# Patient Record
Sex: MALE | Race: WHITE | Employment: FULL TIME | URBAN - METROPOLITAN AREA
[De-identification: names, ages, dates, MRNs, and addresses within clinical notes are randomized per-mention and may not be internally consistent; named-entity substitution may affect disease eponyms.]

---

## 2020-01-05 ENCOUNTER — TRANSFERRED RECORDS (OUTPATIENT)
Dept: HEALTH INFORMATION MANAGEMENT | Facility: CLINIC | Age: 50
End: 2020-01-05

## 2020-07-01 ENCOUNTER — TRANSFERRED RECORDS (OUTPATIENT)
Dept: HEALTH INFORMATION MANAGEMENT | Facility: CLINIC | Age: 50
End: 2020-07-01

## 2020-07-14 ENCOUNTER — PRE VISIT (OUTPATIENT)
Dept: NEUROSURGERY | Facility: CLINIC | Age: 50
End: 2020-07-14

## 2020-07-14 NOTE — TELEPHONE ENCOUNTER
FUTURE VISIT INFORMATION      FUTURE VISIT INFORMATION:    Date: 7/17/2020    Time: 4pm    Location: [facility]  REFERRAL INFORMATION:    Referring provider:      Referring providers clinic:  [facility]    Reason for visit/diagnosis  Glioblastoma    RECORDS REQUESTED FROM:       Clinic name Comments Records Status Imaging Status   [facility] Dr. Lucy Rushing-7/6/2020, 6/8/2020, 3/23/2020    CT Head 8/23/2019, 1/5/2020  MR Brain 1/6/2020, 1/7/2020, 1/8/2020, 4/13/2020, 7/1/2020 Care Everywhere Requested to PACS

## 2020-07-20 ENCOUNTER — VIRTUAL VISIT (OUTPATIENT)
Dept: RADIATION ONCOLOGY | Facility: CLINIC | Age: 50
End: 2020-07-20
Attending: RADIOLOGY
Payer: COMMERCIAL

## 2020-07-20 DIAGNOSIS — C71.9 GLIOBLASTOMA (H): Primary | ICD-10-CM

## 2020-07-20 RX ORDER — CICLOPIROX OLAMINE 7.7 MG/G
0.77 CREAM TOPICAL PRN
Status: ON HOLD | COMMUNITY
Start: 2020-07-02 | End: 2020-09-29

## 2020-07-20 RX ORDER — ONDANSETRON 8 MG/1
8 TABLET, FILM COATED ORAL DAILY
Status: ON HOLD | COMMUNITY
Start: 2020-05-06 | End: 2020-09-29

## 2020-07-20 RX ORDER — METHYLPHENIDATE HYDROCHLORIDE 5 MG/1
5 TABLET ORAL 2 TIMES DAILY
Status: ON HOLD | COMMUNITY
Start: 2020-07-06 | End: 2020-09-29

## 2020-07-20 RX ORDER — PROCHLORPERAZINE MALEATE 10 MG
10 TABLET ORAL PRN
Status: ON HOLD | COMMUNITY
Start: 2020-05-06 | End: 2020-09-29

## 2020-07-20 RX ORDER — LISINOPRIL 10 MG/1
10 TABLET ORAL DAILY
Status: ON HOLD | COMMUNITY
Start: 2019-08-29 | End: 2020-09-29

## 2020-07-20 RX ORDER — DEXAMETHASONE 4 MG/1
4 TABLET ORAL DAILY
Status: ON HOLD | COMMUNITY
Start: 2020-07-06 | End: 2020-09-29

## 2020-07-20 RX ORDER — OXYCODONE HYDROCHLORIDE 5 MG/1
5 TABLET ORAL PRN
Status: ON HOLD | COMMUNITY
Start: 2020-02-26 | End: 2020-09-29

## 2020-07-20 RX ORDER — LEVETIRACETAM 750 MG/1
750 TABLET ORAL 2 TIMES DAILY
Status: ON HOLD | COMMUNITY
Start: 2020-04-17 | End: 2020-09-29

## 2020-07-20 RX ORDER — FLUTICASONE PROPIONATE 50 MCG
50 SPRAY, SUSPENSION (ML) NASAL DAILY
Status: ON HOLD | COMMUNITY
Start: 2019-08-29 | End: 2020-09-29

## 2020-07-20 RX ORDER — ESCITALOPRAM OXALATE 10 MG/1
10 TABLET ORAL DAILY
Status: ON HOLD | COMMUNITY
Start: 2020-05-05 | End: 2020-09-29

## 2020-07-20 RX ORDER — SILDENAFIL CITRATE 20 MG/1
20 TABLET ORAL PRN
Status: ON HOLD | COMMUNITY
Start: 2019-08-29 | End: 2020-09-29

## 2020-07-20 RX ORDER — OXYCODONE AND ACETAMINOPHEN 7.5; 325 MG/1; MG/1
7.5-325 TABLET ORAL PRN
Status: ON HOLD | COMMUNITY
End: 2020-09-29

## 2020-07-20 ASSESSMENT — ENCOUNTER SYMPTOMS
COUGH: 1
HEARTBURN: 0
SPUTUM PRODUCTION: 1
EYE REDNESS: 0
NAUSEA: 0
FOCAL WEAKNESS: 0
EYE PAIN: 1
PHOTOPHOBIA: 1
FALLS: 1
NERVOUS/ANXIOUS: 0
SENSORY CHANGE: 0
CHILLS: 0
MEMORY LOSS: 1
FLANK PAIN: 0
DOUBLE VISION: 0
VOMITING: 0
FEVER: 0
EYE DISCHARGE: 0
CONSTIPATION: 0
LOSS OF CONSCIOUSNESS: 0
PALPITATIONS: 0
DEPRESSION: 0
BACK PAIN: 1
TINGLING: 0
ABDOMINAL PAIN: 0
TREMORS: 0
DIAPHORESIS: 0
HEMOPTYSIS: 0
WEIGHT LOSS: 0
SORE THROAT: 0
SINUS PAIN: 0
SHORTNESS OF BREATH: 0
DIARRHEA: 1
DIZZINESS: 1
HEMATURIA: 0
BLOOD IN STOOL: 0
SPEECH CHANGE: 0
WHEEZING: 0
HALLUCINATIONS: 0
NECK PAIN: 1
WEAKNESS: 1
DYSURIA: 0
BLURRED VISION: 1
MYALGIAS: 0
HEADACHES: 1
SEIZURES: 1
FREQUENCY: 0

## 2020-07-20 ASSESSMENT — LIFESTYLE VARIABLES: SUBSTANCE_ABUSE: 0

## 2020-07-20 NOTE — PROGRESS NOTES
"Gaetano Holley is a 50 year old male who is being evaluated via a billable video visit.      The patient has been notified of following:     \"This video visit will be conducted via a call between you and your physician/provider. We have found that certain health care needs can be provided without the need for an in-person physical exam.  This service lets us provide the care you need with a video conversation.  If a prescription is necessary we can send it directly to your pharmacy.  If lab work is needed we can place an order for that and you can then stop by our lab to have the test done at a later time.    Video visits are billed at different rates depending on your insurance coverage.  Please reach out to your insurance provider with any questions.    If during the course of the call the physician/provider feels a video visit is not appropriate, you will not be charged for this service.\"    Patient has given verbal consent for Video visit? Yes  How would you like to obtain your AVS? MyChart  Will anyone else be joining your video visit? Wife Shalini        Video-Visit Details    Type of service:  Video Visit    Originating Location (pt. Location): Home    Distant Location (provider location):  [facility]     Platform used for Video Visit: [facility]  INITIAL PATIENT ASSESSMENT    Diagnosis: Glioblastoma    Prior radiation therapy:   Site Treated: Brain  [facility]    Dates: 2/20/2020    Prior chemotherapy:   Protocol: Temozolamide  Facility:    Dates: Feb 2020 with radiation       Prior hormonal therapy:No    Pain Eval:  5/10  Located in head, temporal area  Dexamethasone mild relief  Oxycodone prn     Psychosocial  Living arrangements: Wife and 9 year old son  Fall Risk: ambulates with assistive device   referral needs: Not needed    Falls Risk Screening Questions - consult    1. Have you fallen in the past one week?  No.  2. Have you felt " unsteady when walking or standing in the past one week?  Yes.    Advanced Directive: Yes - Location: home  Implantable Cardiac Device? No    Reproductive note: 2 children    Review of Systems   Constitutional: Negative for chills, diaphoresis, fever, malaise/fatigue and weight loss.   HENT: Negative for ear discharge, ear pain, hearing loss, nosebleeds, sinus pain, sore throat and tinnitus.    Eyes: Positive for blurred vision, photophobia and pain. Negative for double vision, discharge and redness.   Respiratory: Positive for cough (r/t allergies) and sputum production. Negative for hemoptysis, shortness of breath and wheezing.    Cardiovascular: Negative for chest pain and palpitations.   Gastrointestinal: Positive for diarrhea. Negative for abdominal pain, blood in stool, constipation, heartburn, nausea and vomiting.   Genitourinary: Negative for dysuria, flank pain, frequency, hematuria and urgency.   Musculoskeletal: Positive for back pain, falls and neck pain. Negative for joint pain and myalgias.   Skin: Negative for itching and rash.   Neurological: Positive for dizziness, seizures (one seizure 1/5/2020), weakness and headaches. Negative for tingling, tremors, sensory change, speech change, focal weakness and loss of consciousness.   Endo/Heme/Allergies: Positive for environmental allergies.   Psychiatric/Behavioral: Positive for memory loss. Negative for depression, hallucinations, substance abuse and suicidal ideas. The patient is not nervous/anxious.            Nurse face-to-face time: Level 4:  15 min face to face time

## 2020-07-20 NOTE — LETTER
"    7/20/2020         RE: Gaetano Holley  [address]  [place] [address]        Dear Colleague,    Thank you for referring your patient, Gaetano Holley, to the [facility]. Please see a copy of my visit note below.    Gaetano Holley is a 50 year old male who is being evaluated via a billable video visit.      The patient has been notified of following:     \"This video visit will be conducted via a call between you and your physician/provider. We have found that certain health care needs can be provided without the need for an in-person physical exam.  This service lets us provide the care you need with a video conversation.  If a prescription is necessary we can send it directly to your pharmacy.  If lab work is needed we can place an order for that and you can then stop by our lab to have the test done at a later time.    Video visits are billed at different rates depending on your insurance coverage.  Please reach out to your insurance provider with any questions.    If during the course of the call the physician/provider feels a video visit is not appropriate, you will not be charged for this service.\"    Patient has given verbal consent for Video visit? Yes  How would you like to obtain your AVS? MyChart  Will anyone else be joining your video visit? Wife Shalini        Video-Visit Details    Type of service:  Video Visit    Originating Location (pt. Location): Home    Distant Location (provider location):  [facility]     Platform used for Video Visit: [facility]  INITIAL PATIENT ASSESSMENT    Diagnosis: Glioblastoma    Prior radiation therapy:   Site Treated: Brain  [facility]    Dates: 2/20/2020    Prior chemotherapy:   Protocol: Temozolamide  Facility: [facility] [place]   Dates: Feb 2020 with radiation       Prior hormonal therapy:No    Pain Eval:  5/10  Located in head, temporal area  Dexamethasone mild relief  Oxycodone prn     Psychosocial  Living " arrangements: Wife and 9 year old son  Fall Risk: ambulates with assistive device   referral needs: Not needed    Falls Risk Screening Questions - consult    1. Have you fallen in the past one week?  No.  2. Have you felt unsteady when walking or standing in the past one week?  Yes.    Advanced Directive: Yes - Location: home  Implantable Cardiac Device? No    Reproductive note: 2 children    Review of Systems   Constitutional: Negative for chills, diaphoresis, fever, malaise/fatigue and weight loss.   HENT: Negative for ear discharge, ear pain, hearing loss, nosebleeds, sinus pain, sore throat and tinnitus.    Eyes: Positive for blurred vision, photophobia and pain. Negative for double vision, discharge and redness.   Respiratory: Positive for cough (r/t allergies) and sputum production. Negative for hemoptysis, shortness of breath and wheezing.    Cardiovascular: Negative for chest pain and palpitations.   Gastrointestinal: Positive for diarrhea. Negative for abdominal pain, blood in stool, constipation, heartburn, nausea and vomiting.   Genitourinary: Negative for dysuria, flank pain, frequency, hematuria and urgency.   Musculoskeletal: Positive for back pain, falls and neck pain. Negative for joint pain and myalgias.   Skin: Negative for itching and rash.   Neurological: Positive for dizziness, seizures (one seizure 1/5/2020), weakness and headaches. Negative for tingling, tremors, sensory change, speech change, focal weakness and loss of consciousness.   Endo/Heme/Allergies: Positive for environmental allergies.   Psychiatric/Behavioral: Positive for memory loss. Negative for depression, hallucinations, substance abuse and suicidal ideas. The patient is not nervous/anxious.            Nurse face-to-face time: Level 4:  15 min face to face time        Radiation Oncology  VIDEO  Consultation:  Date on this visit: 7/20/2020    Gaetano Holley  is referred by   for a radiation oncology  consultation. He requires evaluation for diagnosis of   Glioblastoma     History Of Present Illness:  Mr. Holley is a 50 year old male who presents with a diagnosis of GBM  (IDH negative MGMT status negative, TERT mutation  )  diagnosed in Jan 2020.      Treated with radical subtotal resection under the care of Dr. Lambert in [place]   followed by a course of adjuvant chemoradiation with daily oral Temodar   6000 cGy.   Treatment ended in March    He underwent follow up MRi in April  and started adjuvant temodar and optune.   Follow up MRi on July 1 showed possible  progression or pseudoprogression    He started having daily morning headaches and was started on decadron with relief although not complete relief.    He was referred back to his surgeon and the plan was to re-resect the area.  They heard about our Gammatile program and saw Dr bolton last week.    We discussed his case at our neuro conference on Monday.  Our radiologists thought the MRI looked more like pseudoprogression, than real preogression  We thought a follow up MRi with PERFUSION would be a good next step.    He is talking to me now about my opinion on what to do next.   Past Medical/Surgical History:  Past Medical History:   Diagnosis Date     Abdominal pain 2008    unspecified      Acute cholecystitis 07/01/2008     Depressive disorder 1998     Erectile dysfunction 2008     Hypertension      Impaired fasting glucose 12/12/2008     Iron deficiency anemia      Past Surgical History:   Procedure Laterality Date     CHOLECYSTECTOMY  07/01/2008     colonoscopy biopsy  12/01/2008     right frontal craniotomy  01/07/2020       Past Radiation History:as above  Past Chemotherapy History:as above  Implanted Cardiac device:   none  Advanced directive  :  no    Review of Systems:  Reviewed.  See details in nursing note    Allergies:  Allergies as of 07/20/2020 - Reviewed 07/20/2020   Allergen Reaction Noted     Food Other (See Comments) 06/30/2008     Morphine   07/18/2008     Penicillins Other (See Comments) 01/28/2003     Pineapple Nausea and Vomiting 1972       Current Medications:     Current Outpatient Medications:      ciclopirox (LOPROX) 0.77 % cream, Apply 0.77 Containers topically as needed, Disp: , Rfl:      dexamethasone (DECADRON) 4 MG tablet, Take 4 mg by mouth daily, Disp: , Rfl:      escitalopram (LEXAPRO) 10 MG tablet, Take 10 mg by mouth daily, Disp: , Rfl:      levETIRAcetam (KEPPRA) 750 MG tablet, Take 750 mg by mouth 2 times daily , Disp: , Rfl:      lisinopril (ZESTRIL) 10 MG tablet, Take 10 mg by mouth daily, Disp: , Rfl:      methylphenidate (RITALIN) 5 MG tablet, Take 5 mg by mouth 2 times daily, Disp: , Rfl:      ondansetron (ZOFRAN) 8 MG tablet, Take 8 mg by mouth daily, Disp: , Rfl:      oxyCODONE (ROXICODONE) 5 MG tablet, Take 5 mg by mouth as needed, Disp: , Rfl:      prochlorperazine (COMPAZINE) 10 MG tablet, Take 10 mg by mouth as needed, Disp: , Rfl:      sildenafil (REVATIO) 20 MG tablet, Take 20 mg by mouth as needed, Disp: , Rfl:      fluticasone (FLONASE) 50 MCG/ACT nasal spray, Spray 50 sprays into both nostrils daily, Disp: , Rfl:      oxyCODONE-acetaminophen (PERCOCET) 7.5-325 MG per tablet, Take 7.5-325 tablets by mouth as needed, Disp: , Rfl:     Family History:  History reviewed. No pertinent family history.    Social History:  wife on the video visit with .   Physical Exam:  video exam  GENERAL: Healthy, alert and no distress  EYES: Eyes grossly normal to inspection.  No discharge or erythema, or obvious scleral/conjunctival abnormalities.  RESP: No audible wheeze, cough, or visible cyanosis.  No visible retractions or increased work of breathing.    SKIN: Visible skin clear. No significant rash, abnormal pigmentation or lesions.  NEURO: Cranial nerves grossly intact.  Mentation and speech appropriate for age.  PSYCH: Mentation appears normal, affect normal/bright, judgement and insight intact, normal speech and appearance  well-groomed.      Pathology:as above     Laboratory/Imaging Studies  reviewed  ASSESSMENT:    GBM s/p resection and chemoradiotherapy now with MRI findings suggestive of either progrssion or pseudoprogression      RECOMMENDATION:    To tell progression from pseudoprogression is often a challenge.   Perfusion or PET scans are useful.    Serial MRIs may be helpflul.      My suggestion would be to repeat the MRI in a few weeks with perfusion imaging.   WE may be able to tell if this is progression or not then.     He informed me that they can do perfusion in [place] and that they have that scheduled there next week.   I suggested that they send the images to us to review.    I went over the pros and cons of max safe resection and gammatile placement.   I would not consider that option until we knew for sure this was progression and my preference would be to try to delay use of re-irradiation this close to the initiail radiotherapy. .    Thank you for allowing me to participate in Gaetano Holley 's care .  Please do not hesitate to call me with questions.    Salma Sarabia MD  [phone]   Pager   [phone]  [facility]   [phone] [place]  [phone] [facility]  Primary Physician: David Quiñonez   Patient Care Team:  David Quiñonez MD as PCP - General (Family Practice)  Raul Haq MD as MD (Neurosurgery)                  Again, thank you for allowing me to participate in the care of your patient.        Sincerely,        Salma Sarabia MD

## 2020-07-21 NOTE — PROGRESS NOTES
Radiation Oncology  VIDEO  Consultation:  Date on this visit: 7/20/2020    Gaetano Holley  is referred by   for a radiation oncology consultation. He requires evaluation for diagnosis of   Glioblastoma     History Of Present Illness:  Mr. Holley is a 50 year old male who presents with a diagnosis of GBM  (IDH negative MGMT status negative, TERT mutation  )  diagnosed in Jan 2020.      Treated with radical subtotal resection under the care of Dr. Lambert in [place]   followed by a course of adjuvant chemoradiation with daily oral Temodar   6000 cGy.   Treatment ended in March    He underwent follow up MRi in April  and started adjuvant temodar and optune.   Follow up MRi on July 1 showed possible  progression or pseudoprogression    He started having daily morning headaches and was started on decadron with relief although not complete relief.    He was referred back to his surgeon and the plan was to re-resect the area.  They heard about our Gammatile program and saw Dr bolton last week.    We discussed his case at our neuro conference on Monday.  Our radiologists thought the MRI looked more like pseudoprogression, than real preogression  We thought a follow up MRi with PERFUSION would be a good next step.    He is talking to me now about my opinion on what to do next.   Past Medical/Surgical History:  Past Medical History:   Diagnosis Date     Abdominal pain 2008    unspecified      Acute cholecystitis 07/01/2008     Depressive disorder 1998     Erectile dysfunction 2008     Hypertension      Impaired fasting glucose 12/12/2008     Iron deficiency anemia      Past Surgical History:   Procedure Laterality Date     CHOLECYSTECTOMY  07/01/2008     colonoscopy biopsy  12/01/2008     right frontal craniotomy  01/07/2020       Past Radiation History:as above  Past Chemotherapy History:as above  Implanted Cardiac device:   none  Advanced directive  :  no    Review of Systems:  Reviewed.  See details in nursing  note    Allergies:  Allergies as of 07/20/2020 - Reviewed 07/20/2020   Allergen Reaction Noted     Food Other (See Comments) 06/30/2008     Morphine  07/18/2008     Penicillins Other (See Comments) 01/28/2003     Pineapple Nausea and Vomiting 1972       Current Medications:     Current Outpatient Medications:      ciclopirox (LOPROX) 0.77 % cream, Apply 0.77 Containers topically as needed, Disp: , Rfl:      dexamethasone (DECADRON) 4 MG tablet, Take 4 mg by mouth daily, Disp: , Rfl:      escitalopram (LEXAPRO) 10 MG tablet, Take 10 mg by mouth daily, Disp: , Rfl:      levETIRAcetam (KEPPRA) 750 MG tablet, Take 750 mg by mouth 2 times daily , Disp: , Rfl:      lisinopril (ZESTRIL) 10 MG tablet, Take 10 mg by mouth daily, Disp: , Rfl:      methylphenidate (RITALIN) 5 MG tablet, Take 5 mg by mouth 2 times daily, Disp: , Rfl:      ondansetron (ZOFRAN) 8 MG tablet, Take 8 mg by mouth daily, Disp: , Rfl:      oxyCODONE (ROXICODONE) 5 MG tablet, Take 5 mg by mouth as needed, Disp: , Rfl:      prochlorperazine (COMPAZINE) 10 MG tablet, Take 10 mg by mouth as needed, Disp: , Rfl:      sildenafil (REVATIO) 20 MG tablet, Take 20 mg by mouth as needed, Disp: , Rfl:      fluticasone (FLONASE) 50 MCG/ACT nasal spray, Spray 50 sprays into both nostrils daily, Disp: , Rfl:      oxyCODONE-acetaminophen (PERCOCET) 7.5-325 MG per tablet, Take 7.5-325 tablets by mouth as needed, Disp: , Rfl:     Family History:  History reviewed. No pertinent family history.    Social History:  wife on the video visit with .   Physical Exam:  video exam  GENERAL: Healthy, alert and no distress  EYES: Eyes grossly normal to inspection.  No discharge or erythema, or obvious scleral/conjunctival abnormalities.  RESP: No audible wheeze, cough, or visible cyanosis.  No visible retractions or increased work of breathing.    SKIN: Visible skin clear. No significant rash, abnormal pigmentation or lesions.  NEURO: Cranial nerves grossly intact.   Mentation and speech appropriate for age.  PSYCH: Mentation appears normal, affect normal/bright, judgement and insight intact, normal speech and appearance well-groomed.      Pathology:as above     Laboratory/Imaging Studies  reviewed  ASSESSMENT:    GBM s/p resection and chemoradiotherapy now with MRI findings suggestive of either progrssion or pseudoprogression      RECOMMENDATION:    To tell progression from pseudoprogression is often a challenge.   Perfusion or PET scans are useful.    Serial MRIs may be helpflul.      My suggestion would be to repeat the MRI in a few weeks with perfusion imaging.   WE may be able to tell if this is progression or not then.     He informed me that they can do perfusion in [place] and that they have that scheduled there next week.   I suggested that they send the images to us to review.    I went over the pros and cons of max safe resection and gammatile placement.   I would not consider that option until we knew for sure this was progression and my preference would be to try to delay use of re-irradiation this close to the initiail radiotherapy. .    Thank you for allowing me to participate in Gaetano Holley 's care .  Please do not hesitate to call me with questions.    Salma Sarabia MD  [phone]   Pager   [phone]  [facility]   [phone] [place]  [phone] [facility]  Primary Physician: David Quiñonez   Patient Care Team:  David Quiñonez MD as PCP - General (Family Practice)  Raul Haq MD as MD (Neurosurgery)

## 2020-07-22 DIAGNOSIS — C71.9 GLIOBLASTOMA (H): Primary | ICD-10-CM

## 2020-08-04 ENCOUNTER — HOSPITAL ENCOUNTER (OUTPATIENT)
Dept: MRI IMAGING | Facility: CLINIC | Age: 50
Discharge: HOME | End: 2020-08-04
Attending: RADIOLOGY | Admitting: RADIOLOGY
Payer: COMMERCIAL

## 2020-08-04 DIAGNOSIS — C71.9 GLIOBLASTOMA (H): ICD-10-CM

## 2020-08-04 PROCEDURE — A9585 GADOBUTROL INJECTION: HCPCS | Performed by: RADIOLOGY

## 2020-08-04 PROCEDURE — 25500064 ZZH RX 255 OP 636: Performed by: RADIOLOGY

## 2020-08-04 PROCEDURE — 70553 MRI BRAIN STEM W/O & W/DYE: CPT

## 2020-08-04 RX ORDER — GADOBUTROL 604.72 MG/ML
10 INJECTION INTRAVENOUS ONCE
Status: COMPLETED | OUTPATIENT
Start: 2020-08-04 | End: 2020-08-04

## 2020-08-04 RX ADMIN — GADOBUTROL 10 ML: 604.72 INJECTION INTRAVENOUS at 14:26

## 2020-08-06 DIAGNOSIS — C71.9 GLIOBLASTOMA (H): Primary | ICD-10-CM

## 2020-08-10 ENCOUNTER — TUMOR CONFERENCE (OUTPATIENT)
Dept: ONCOLOGY | Facility: CLINIC | Age: 50
End: 2020-08-10
Attending: RADIOLOGY

## 2020-08-10 DIAGNOSIS — C71.9 GLIOBLASTOMA (H): ICD-10-CM

## 2020-08-10 NOTE — TUMOR CONFERENCE
Tumor Conference Information  Tumor Conference:  Brain  Specialties Present:  Pathology, Radiology, Radiation oncology, Surgery  Patient Status:  A current patient  Pathology:  Not Discussed  Treatment to Date:  Surgical intervention(s), Chemoradiation, Adjuvant chemotherapy  Clinical Trial Eligibility:  Not discussed  Recommended Plan:  Observation (see comment) (Comment: reviewed imaging; recommend repeat imaging in 1-2 months with follow up)  Did the review exceed 30 minutes?:  did not           Documentation / Disclaimer Cancer Tumor Board Note  Cancer tumor board recommendations do not override what is determined to be reasonable care and treatment, which is dependent on the circumstances of a patient's case; the patient's medical, social, and personal concerns; and the clinical judgment of the oncologist [physician].

## 2020-09-16 ENCOUNTER — PREP FOR PROCEDURE (OUTPATIENT)
Dept: NEUROSURGERY | Facility: CLINIC | Age: 50
End: 2020-09-16

## 2020-09-16 DIAGNOSIS — C71.9 GLIOBLASTOMA (H): Primary | ICD-10-CM

## 2020-09-17 DIAGNOSIS — C71.9 GLIOBLASTOMA (H): Primary | ICD-10-CM

## 2020-09-18 ENCOUNTER — VIRTUAL VISIT (OUTPATIENT)
Dept: NEUROSURGERY | Facility: CLINIC | Age: 50
End: 2020-09-18
Attending: NEUROLOGICAL SURGERY
Payer: COMMERCIAL

## 2020-09-18 ENCOUNTER — DOCUMENTATION ONLY (OUTPATIENT)
Dept: NEUROSURGERY | Facility: CLINIC | Age: 50
End: 2020-09-18

## 2020-09-18 ENCOUNTER — PREP FOR PROCEDURE (OUTPATIENT)
Dept: NEUROSURGERY | Facility: CLINIC | Age: 50
End: 2020-09-18

## 2020-09-18 DIAGNOSIS — C71.9 GLIOBLASTOMA (H): Primary | ICD-10-CM

## 2020-09-18 DIAGNOSIS — C71.1 MALIGNANT NEOPLASM OF FRONTAL LOBE OF BRAIN (H): Primary | ICD-10-CM

## 2020-09-18 PROCEDURE — 40001009 ZZH VIDEO/TELEPHONE VISIT; NO CHARGE

## 2020-09-18 RX ORDER — LOMUSTINE 100 MG/1
CAPSULE, GELATIN COATED ORAL
Status: ON HOLD | COMMUNITY
Start: 2020-08-07 | End: 2020-09-29

## 2020-09-18 RX ORDER — OXYCODONE 13.5 MG/1
CAPSULE, EXTENDED RELEASE ORAL
Status: ON HOLD | COMMUNITY
Start: 2020-08-14 | End: 2020-09-29

## 2020-09-18 NOTE — LETTER
"    9/18/2020         RE: Gaetano Holley  [address]  [place] [address]        Dear Colleague,    Thank you for referring your patient, Gaetano Holley, to the [facility]. Please see a copy of my visit note below.    Gaetano Holley is a 50 year old male who is being evaluated via a billable video visit.      The patient has been notified of following:     \"This video visit will be conducted via a call between you and your physician/provider. We have found that certain health care needs can be provided without the need for an in-person physical exam.  This service lets us provide the care you need with a video conversation.  If a prescription is necessary we can send it directly to your pharmacy.  If lab work is needed we can place an order for that and you can then stop by our lab to have the test done at a later time.    Video visits are billed at different rates depending on your insurance coverage.  Please reach out to your insurance provider with any questions.    If during the course of the call the physician/provider feels a video visit is not appropriate, you will not be charged for this service.\"    Patient has given verbal consent for Video visit? Yes    How would you like to obtain your AVS? MyChart     If you are dropped from the video visit, the video invite should be resent to: Text to cell phone: [phone]     Will anyone else be joining your video visit? No          Vitals - Patient Reported  Weight (Patient Reported): 104.3 kg (230 lb)  Height (Patient Reported): 180.3 cm (5' 11\")  BMI (Based on Pt Reported Ht/Wt): 32.08  Pain Score: Severe Pain (6)  Pain Loc: Head    Jomar James LPN      Video-Visit Details    Type of service:  Video Visit    Video Start Time: 2:38 PM  Video End Time: 3:25 PM    Originating Location (pt. Location): Home    Distant Location (provider location):  [facility]     Platform used for Video Visit: " Kath Haq MD    Neurosurgery Clinic Note    50 M with an right frontal, wtIDH glioblastoma s/p TMZ/RT with progressive enlargement of CE+ lesion on serial MRI. Associated with this progressive CE+ consolidation/enlargement is the patient's increased complaint of left-sided weakness/imbalance. I had reviewed the images with Dr. Sarabia and we jointly feel that surgical resection is warranted at this time. Dr. Sarabia does not feel that the patient is a good candidate for gammatile placement. Examination is as before, though the patient feels that he is subjectively weaker on the left, with greater imbalance on stand and gait.     MRI from 9/11/2020 was reviewed and is as above described    AP: 50 M with likely recurrent glioblastoma who can benefit from a surgical resection. Risks and benefits of a 5ALA aided resection was reviewed with the patient and his wife. All questions are answered. I will proceed to schedule this surgery at the next available date.    I have spent 45  minutes today, with the majority of the visit counseling the patient on the diagnosis. All questions were answered. Over 50% of my time on the unit was spent counseling the patient and coordinating care regarding surgery of the right frontal glioblastoma.              Again, thank you for allowing me to participate in the care of your patient.        Sincerely,        Raul Haq MD

## 2020-09-18 NOTE — PROGRESS NOTES
"Gaetano Holley is a 50 year old male who is being evaluated via a billable video visit.      The patient has been notified of following:     \"This video visit will be conducted via a call between you and your physician/provider. We have found that certain health care needs can be provided without the need for an in-person physical exam.  This service lets us provide the care you need with a video conversation.  If a prescription is necessary we can send it directly to your pharmacy.  If lab work is needed we can place an order for that and you can then stop by our lab to have the test done at a later time.    Video visits are billed at different rates depending on your insurance coverage.  Please reach out to your insurance provider with any questions.    If during the course of the call the physician/provider feels a video visit is not appropriate, you will not be charged for this service.\"    Patient has given verbal consent for Video visit? Yes    How would you like to obtain your AVS? MyChart     If you are dropped from the video visit, the video invite should be resent to: Text to cell phone: [phone]     Will anyone else be joining your video visit? No          Vitals - Patient Reported  Weight (Patient Reported): 104.3 kg (230 lb)  Height (Patient Reported): 180.3 cm (5' 11\")  BMI (Based on Pt Reported Ht/Wt): 32.08  Pain Score: Severe Pain (6)  Pain Loc: Head    [facility] LPN      Video-Visit Details    Type of service:  Video Visit    Video Start Time: 2:38 PM  Video End Time: 3:25 PM    Originating Location (pt. Location): Home    Distant Location (provider location):  [facility]     Platform used for Video Visit: Kath Haq MD    Neurosurgery Clinic Note    50 M with an right frontal, wtIDH glioblastoma s/p TMZ/RT with progressive enlargement of CE+ lesion on serial MRI. Associated with this progressive CE+ consolidation/enlargement is the patient's " increased complaint of left-sided weakness/imbalance. I had reviewed the images with Dr. Sarabia and we jointly feel that surgical resection is warranted at this time. Dr. Sarabia does not feel that the patient is a good candidate for gammatile placement. Examination is as before, though the patient feels that he is subjectively weaker on the left, with greater imbalance on stand and gait.     MRI from 9/11/2020 was reviewed and is as above described    AP: 50 M with likely recurrent glioblastoma who can benefit from a surgical resection. Risks and benefits of a 5ALA aided resection was reviewed with the patient and his wife. All questions are answered. I will proceed to schedule this surgery at the next available date.    I have spent 45  minutes today, with the majority of the visit counseling the patient on the diagnosis. All questions were answered. Over 50% of my time on the unit was spent counseling the patient and coordinating care regarding surgery of the right frontal glioblastoma.

## 2020-09-21 ENCOUNTER — PATIENT OUTREACH (OUTPATIENT)
Dept: ONCOLOGY | Facility: CLINIC | Age: 50
End: 2020-09-21

## 2020-09-21 ENCOUNTER — TUMOR CONFERENCE (OUTPATIENT)
Dept: ONCOLOGY | Facility: CLINIC | Age: 50
End: 2020-09-21
Attending: RADIOLOGY

## 2020-09-21 DIAGNOSIS — C71.9 GLIOBLASTOMA (H): ICD-10-CM

## 2020-09-21 NOTE — TUMOR CONFERENCE
Tumor Conference Information  Tumor Conference:  Brain  Specialties Present:  Medical oncology, Pathology, Radiology, Radiation oncology, Surgery  Patient Status:  A current patient, Recurrence  Pathology:  Not Discussed  Treatment to Date:  Surgical intervention(s), Chemoradiation, Adjuvant chemotherapy  Clinical Trial Eligibility:  Not discussed  Recommended Plan:  Surgery, Palliative radiation therapy (Comment: reviewed imaging; plan for surgery this week and postop radiation to follow)  Did the review exceed 30 minutes?:  did not           Documentation / Disclaimer Cancer Tumor Board Note  Cancer tumor board recommendations do not override what is determined to be reasonable care and treatment, which is dependent on the circumstances of a patient's case; the patient's medical, social, and personal concerns; and the clinical judgment of the oncologist [physician].

## 2020-09-22 ENCOUNTER — DOCUMENTATION ONLY (OUTPATIENT)
Dept: CARE COORDINATION | Facility: CLINIC | Age: 50
End: 2020-09-22

## 2020-09-22 ENCOUNTER — ANESTHESIA EVENT (OUTPATIENT)
Dept: SURGERY | Facility: CLINIC | Age: 50
DRG: 025 | End: 2020-09-22
Payer: COMMERCIAL

## 2020-09-22 ASSESSMENT — ENCOUNTER SYMPTOMS: SEIZURES: 1

## 2020-09-22 NOTE — ANESTHESIA PREPROCEDURE EVALUATION
Anesthesia Pre-Procedure Evaluation    Patient: Gaetano Holley   MRN:     [id number] Gender:   male   Age:    50 year old :      1970        Preoperative Diagnosis: Glioblastoma (H) [C71.9]   Procedure(s):  INTRAOPERATIVE Magnetic Resonance Imaging CRANIOTOMY, USING OPTICAL TRACKING SYSTEM, [place]     LABS:  CBC: No results found for: WBC, HGB, HCT, PLT  BMP: No results found for: NA, POTASSIUM, CHLORIDE, CO2, BUN, CR, GLC  COAGS: No results found for: PTT, INR, FIBR  POC: No results found for: BGM, HCG, HCGS  OTHER: No results found for: PH, LACT, A1C, MARGUERITE, PHOS, MAG, ALBUMIN, PROTTOTAL, ALT, AST, GGT, ALKPHOS, BILITOTAL, BILIDIRECT, LIPASE, AMYLASE, LEONORA, TSH, T4, T3, CRP, SED     Preop Vitals    BP Readings from Last 3 Encounters:   No data found for BP    Pulse Readings from Last 3 Encounters:   No data found for Pulse      Resp Readings from Last 3 Encounters:   No data found for Resp    SpO2 Readings from Last 3 Encounters:   No data found for SpO2      Temp Readings from Last 1 Encounters:   No data found for Temp    Ht Readings from Last 1 Encounters:   No data found for Ht      Wt Readings from Last 1 Encounters:   No data found for Wt    There is no height or weight on file to calculate BMI.     LDA:        Past Medical History:   Diagnosis Date     Abdominal pain     unspecified      Acute cholecystitis 2008     Depressive disorder 1998     Erectile dysfunction      Hypertension      Impaired fasting glucose 2008     Iron deficiency anemia       Past Surgical History:   Procedure Laterality Date     CHOLECYSTECTOMY  2008     colonoscopy biopsy  2008     right frontal craniotomy  2020      Allergies   Allergen Reactions     Food Other (See Comments)     Pineapple and pineapple juice causes severe vomiting     Morphine      Sedated but the pain was not relieved       Penicillins Other (See Comments)     Childhood reaction, possible anaphylaxis      Pineapple Nausea and Vomiting        Anesthesia Evaluation     . Pt has had prior anesthetic. Type: General    No history of anesthetic complications          ROS/MED HX    ENT/Pulmonary:     (+)allergic rhinitis, , . .    Neurologic: Comment: Glioma R frontal cortex. On Keppra, dexamethasone.    LUE and LLE weakness; needs cane to help with getting around    (+)seizures    (-) CVA and TIA   Cardiovascular: Comment: On sildenafil for erectile dysfunction    TTE 8/28/2019:  LV EF 50-55% nl size, no RWMA  RV normal    (+) hypertension (on lisinopril)----. : . . . :. .      (-) arrhythmias, irregular heartbeat/palpitations and valvular problems/murmurs   METS/Exercise Tolerance:  >4 METS   Hematologic:         Musculoskeletal:         GI/Hepatic:     (+) GERD (on omeprazole)      (-) liver disease   Renal/Genitourinary:  - ROS Renal section negative    (-) renal disease   Endo:     (+) Obesity, .      Psychiatric:     (+) psychiatric history depression (on escitalopram)      Infectious Disease:         Malignancy:   (+) Malignancy (GBM s/p TMZ and RT; likely recurrence) History of Neuro  Neuro CA Active status post Chemo and Radiation.          Other: Comment: Oxycodone 10mg PRN   (+) H/O Chronic Pain,H/O chronic opiod use ,                        PHYSICAL EXAM:   Mental Status/Neuro: A/A/O   Airway: Facies: Feasible  Mallampati: II  TM distance: > 6 cm  Neck ROM: Full   Respiratory: Auscultation: CTAB     Resp. Rate: Normal     Resp. Effort: Normal      CV: Rhythm: Regular  Heart: Normal Sounds   Comments:      Dental: Normal Dentition                Assessment:   ASA SCORE: 3      Smoking Status:  Non-Smoker/Unknown   NPO Status: NPO Appropriate     Plan:   Anes. Type:  General   Pre-Medication: None   Induction:  IV (Standard)   Airway: ETT; Oral   Access/Monitoring: PIV; 2nd PIV; A-Line   Maintenance: Balanced     Postop Plan:   Postop Pain: Opioids  Postop Sedation/Airway: Not planned  Disposition: Inpatient/Admit      PONV Management:   Adult Risk Factors:, Non-Smoker, Postop Opioids   Prevention: Ondansetron, Dexamethasone     CONSENT: Direct conversation   Plan and risks discussed with: Patient   Blood Products: Consented (ALL Blood Products)                   Luis Alfredo Bordy MD

## 2020-09-22 NOTE — PROGRESS NOTES
Spoke with patient after consultation appt with Dr. Haq. Pt  verbalizes understanding of Dr. Haq's plan of care and denies questions or barriers to care today.    Introduced self and role of RN Care Coordinator at [facility].  Provided my contact information.    Answered question regarding Craniotomy    Verbal and writtien instructions provided re:     Pre-operative instructions/routine and requirements to include:  Surgical procedure, need for History and Physical Preoperative Assessment Center appointment, NPO prior to surgery, showering instructions, medications to avoid,  post-operative expectations, pain control, follow-up after surgery and contact information for questions or concerns prior to surgery.        Time was provided for patient to ask questions and they were answered to his/her stated satisfaction and understanding.      Mary Kay Queen, RN, BSN  Care Coordinator  [facility]

## 2020-09-23 ENCOUNTER — HOSPITAL ENCOUNTER (OUTPATIENT)
Dept: MRI IMAGING | Facility: CLINIC | Age: 50
DRG: 025 | End: 2020-09-23
Attending: NEUROLOGICAL SURGERY | Admitting: NEUROLOGICAL SURGERY
Payer: COMMERCIAL

## 2020-09-23 ENCOUNTER — APPOINTMENT (OUTPATIENT)
Dept: MRI IMAGING | Facility: CLINIC | Age: 50
DRG: 025 | End: 2020-09-23
Attending: NEUROLOGICAL SURGERY
Payer: COMMERCIAL

## 2020-09-23 ENCOUNTER — APPOINTMENT (OUTPATIENT)
Dept: CT IMAGING | Facility: CLINIC | Age: 50
DRG: 025 | End: 2020-09-23
Attending: NEUROLOGICAL SURGERY
Payer: COMMERCIAL

## 2020-09-23 ENCOUNTER — APPOINTMENT (OUTPATIENT)
Dept: GENERAL RADIOLOGY | Facility: CLINIC | Age: 50
DRG: 025 | End: 2020-09-23
Attending: NEUROLOGICAL SURGERY
Payer: COMMERCIAL

## 2020-09-23 ENCOUNTER — ANESTHESIA (OUTPATIENT)
Dept: SURGERY | Facility: CLINIC | Age: 50
DRG: 025 | End: 2020-09-23
Payer: COMMERCIAL

## 2020-09-23 ENCOUNTER — HOSPITAL ENCOUNTER (INPATIENT)
Facility: CLINIC | Age: 50
LOS: 6 days | Discharge: NURSING, REHAB OR LONG-TERM CARE | DRG: 025 | End: 2020-09-29
Attending: NEUROLOGICAL SURGERY | Admitting: NEUROLOGICAL SURGERY
Payer: COMMERCIAL

## 2020-09-23 DIAGNOSIS — C71.9 GLIOBLASTOMA (H): ICD-10-CM

## 2020-09-23 LAB
ABO + RH BLD: NORMAL
ABO + RH BLD: NORMAL
BLD GP AB SCN SERPL QL: NORMAL
BLOOD BANK CMNT PATIENT-IMP: NORMAL
CREAT SERPL-MCNC: 0.86 MG/DL (ref 0.66–1.25)
ERYTHROCYTE [DISTWIDTH] IN BLOOD BY AUTOMATED COUNT: 16.1 % (ref 10–15)
GFR SERPL CREATININE-BSD FRML MDRD: >90 ML/MIN/{1.73_M2}
GLUCOSE BLDC GLUCOMTR-MCNC: 133 MG/DL (ref 70–99)
GLUCOSE BLDC GLUCOMTR-MCNC: 151 MG/DL (ref 70–99)
GLUCOSE BLDC GLUCOMTR-MCNC: 170 MG/DL (ref 70–99)
HCT VFR BLD AUTO: 38.9 % (ref 40–53)
HGB BLD-MCNC: 13.3 G/DL (ref 13.3–17.7)
MCH RBC QN AUTO: 31.2 PG (ref 26.5–33)
MCHC RBC AUTO-ENTMCNC: 34.2 G/DL (ref 31.5–36.5)
MCV RBC AUTO: 91 FL (ref 78–100)
PLATELET # BLD AUTO: 232 10E9/L (ref 150–450)
POTASSIUM BLD-SCNC: 3.9 MMOL/L (ref 3.4–5.3)
RBC # BLD AUTO: 4.26 10E12/L (ref 4.4–5.9)
SPECIMEN EXP DATE BLD: NORMAL
WBC # BLD AUTO: 13.8 10E9/L (ref 4–11)

## 2020-09-23 PROCEDURE — A9585 GADOBUTROL INJECTION: HCPCS | Performed by: NEUROLOGICAL SURGERY

## 2020-09-23 PROCEDURE — 36415 COLL VENOUS BLD VENIPUNCTURE: CPT | Performed by: ANESTHESIOLOGY

## 2020-09-23 PROCEDURE — 00000160 ZZHCL STATISTIC H-SPECIAL HANDLING: Performed by: NEUROLOGICAL SURGERY

## 2020-09-23 PROCEDURE — 40000275 ZZH STATISTIC RCP TIME EA 10 MIN

## 2020-09-23 PROCEDURE — 25000566 ZZH SEVOFLURANE, EA 15 MIN: Performed by: NEUROLOGICAL SURGERY

## 2020-09-23 PROCEDURE — 86901 BLOOD TYPING SEROLOGIC RH(D): CPT | Performed by: ANESTHESIOLOGY

## 2020-09-23 PROCEDURE — 25000128 H RX IP 250 OP 636: Performed by: STUDENT IN AN ORGANIZED HEALTH CARE EDUCATION/TRAINING PROGRAM

## 2020-09-23 PROCEDURE — 00000146 ZZHCL STATISTIC GLUCOSE BY METER IP

## 2020-09-23 PROCEDURE — C1763 CONN TISS, NON-HUMAN: HCPCS | Performed by: NEUROLOGICAL SURGERY

## 2020-09-23 PROCEDURE — 25000132 ZZH RX MED GY IP 250 OP 250 PS 637: Performed by: STUDENT IN AN ORGANIZED HEALTH CARE EDUCATION/TRAINING PROGRAM

## 2020-09-23 PROCEDURE — C1713 ANCHOR/SCREW BN/BN,TIS/BN: HCPCS | Performed by: NEUROLOGICAL SURGERY

## 2020-09-23 PROCEDURE — 00B70ZZ EXCISION OF CEREBRAL HEMISPHERE, OPEN APPROACH: ICD-10-PCS | Performed by: NEUROLOGICAL SURGERY

## 2020-09-23 PROCEDURE — 88307 TISSUE EXAM BY PATHOLOGIST: CPT | Performed by: NEUROLOGICAL SURGERY

## 2020-09-23 PROCEDURE — 25000128 H RX IP 250 OP 636: Performed by: ANESTHESIOLOGY

## 2020-09-23 PROCEDURE — 85027 COMPLETE CBC AUTOMATED: CPT | Performed by: ANESTHESIOLOGY

## 2020-09-23 PROCEDURE — 25800030 ZZH RX IP 258 OP 636: Performed by: STUDENT IN AN ORGANIZED HEALTH CARE EDUCATION/TRAINING PROGRAM

## 2020-09-23 PROCEDURE — 25800030 ZZH RX IP 258 OP 636: Performed by: NURSE ANESTHETIST, CERTIFIED REGISTERED

## 2020-09-23 PROCEDURE — 71000015 ZZH RECOVERY PHASE 1 LEVEL 2 EA ADDTL HR: Performed by: NEUROLOGICAL SURGERY

## 2020-09-23 PROCEDURE — 36000080 ZZH SURGERY LEVEL 7 1ST 30 MIN - UMMC: Performed by: NEUROLOGICAL SURGERY

## 2020-09-23 PROCEDURE — 71000014 ZZH RECOVERY PHASE 1 LEVEL 2 FIRST HR: Performed by: NEUROLOGICAL SURGERY

## 2020-09-23 PROCEDURE — 82565 ASSAY OF CREATININE: CPT | Performed by: NEUROLOGICAL SURGERY

## 2020-09-23 PROCEDURE — 36000082 ZZH SURGERY LEVEL 7 EA 15 ADDTL MIN - UMMC: Performed by: NEUROLOGICAL SURGERY

## 2020-09-23 PROCEDURE — 40000170 ZZH STATISTIC PRE-PROCEDURE ASSESSMENT II: Performed by: NEUROLOGICAL SURGERY

## 2020-09-23 PROCEDURE — 25500064 ZZH RX 255 OP 636: Performed by: NEUROLOGICAL SURGERY

## 2020-09-23 PROCEDURE — 40000986 XR CHEST PORT 1 VW

## 2020-09-23 PROCEDURE — 70552 MRI BRAIN STEM W/DYE: CPT | Mod: 76

## 2020-09-23 PROCEDURE — 40000014 ZZH STATISTIC ARTERIAL MONITORING DAILY

## 2020-09-23 PROCEDURE — 70450 CT HEAD/BRAIN W/O DYE: CPT

## 2020-09-23 PROCEDURE — 86850 RBC ANTIBODY SCREEN: CPT | Performed by: ANESTHESIOLOGY

## 2020-09-23 PROCEDURE — 86900 BLOOD TYPING SEROLOGIC ABO: CPT | Performed by: ANESTHESIOLOGY

## 2020-09-23 PROCEDURE — 37000009 ZZH ANESTHESIA TECHNICAL FEE, EACH ADDTL 15 MIN: Performed by: NEUROLOGICAL SURGERY

## 2020-09-23 PROCEDURE — 00B70ZX EXCISION OF CEREBRAL HEMISPHERE, OPEN APPROACH, DIAGNOSTIC: ICD-10-PCS | Performed by: NEUROLOGICAL SURGERY

## 2020-09-23 PROCEDURE — 25000128 H RX IP 250 OP 636: Performed by: NEUROLOGICAL SURGERY

## 2020-09-23 PROCEDURE — 25000125 ZZHC RX 250: Performed by: STUDENT IN AN ORGANIZED HEALTH CARE EDUCATION/TRAINING PROGRAM

## 2020-09-23 PROCEDURE — 25000125 ZZHC RX 250: Performed by: NEUROLOGICAL SURGERY

## 2020-09-23 PROCEDURE — 88331 PATH CONSLTJ SURG 1 BLK 1SPC: CPT | Performed by: NEUROLOGICAL SURGERY

## 2020-09-23 PROCEDURE — 70552 MRI BRAIN STEM W/DYE: CPT

## 2020-09-23 PROCEDURE — 25000125 ZZHC RX 250: Performed by: NURSE ANESTHETIST, CERTIFIED REGISTERED

## 2020-09-23 PROCEDURE — 8E09XBH COMPUTER ASSISTED PROCEDURE OF HEAD AND NECK REGION, WITH MAGNETIC RESONANCE IMAGING: ICD-10-PCS | Performed by: NEUROLOGICAL SURGERY

## 2020-09-23 PROCEDURE — 25800030 ZZH RX IP 258 OP 636: Performed by: NEUROLOGICAL SURGERY

## 2020-09-23 PROCEDURE — 27210995 ZZH RX 272: Performed by: NEUROLOGICAL SURGERY

## 2020-09-23 PROCEDURE — 25000128 H RX IP 250 OP 636: Performed by: NURSE ANESTHETIST, CERTIFIED REGISTERED

## 2020-09-23 PROCEDURE — 27210794 ZZH OR GENERAL SUPPLY STERILE: Performed by: NEUROLOGICAL SURGERY

## 2020-09-23 PROCEDURE — 37000008 ZZH ANESTHESIA TECHNICAL FEE, 1ST 30 MIN: Performed by: NEUROLOGICAL SURGERY

## 2020-09-23 PROCEDURE — 20000004 ZZH R&B ICU UMMC

## 2020-09-23 PROCEDURE — 27810169 ZZH OR IMPLANT GENERAL: Performed by: NEUROLOGICAL SURGERY

## 2020-09-23 PROCEDURE — 84132 ASSAY OF SERUM POTASSIUM: CPT | Performed by: ANESTHESIOLOGY

## 2020-09-23 DEVICE — IMP PLATE SYN BOX LOW PROFILE 04H TI 421.511: Type: IMPLANTABLE DEVICE | Site: CRANIAL | Status: FUNCTIONAL

## 2020-09-23 DEVICE — IMP SCR SYN MATRIX LOW PRO 1.5X04MM SELF DRILL 04.503.104.01: Type: IMPLANTABLE DEVICE | Site: CRANIAL | Status: FUNCTIONAL

## 2020-09-23 DEVICE — IMP BUR HOLE COVER 24MM LOW PROFILE TI 421.528: Type: IMPLANTABLE DEVICE | Site: CRANIAL | Status: FUNCTIONAL

## 2020-09-23 DEVICE — GRAFT DURAGEN 3X3" ID330: Type: IMPLANTABLE DEVICE | Site: CRANIAL | Status: FUNCTIONAL

## 2020-09-23 RX ORDER — SODIUM CHLORIDE, SODIUM LACTATE, POTASSIUM CHLORIDE, CALCIUM CHLORIDE 600; 310; 30; 20 MG/100ML; MG/100ML; MG/100ML; MG/100ML
INJECTION, SOLUTION INTRAVENOUS CONTINUOUS PRN
Status: DISCONTINUED | OUTPATIENT
Start: 2020-09-23 | End: 2020-09-23

## 2020-09-23 RX ORDER — HYDRALAZINE HYDROCHLORIDE 20 MG/ML
10-20 INJECTION INTRAMUSCULAR; INTRAVENOUS EVERY 30 MIN PRN
Status: DISCONTINUED | OUTPATIENT
Start: 2020-09-23 | End: 2020-09-25

## 2020-09-23 RX ORDER — MAGNESIUM SULFATE HEPTAHYDRATE 40 MG/ML
2 INJECTION, SOLUTION INTRAVENOUS DAILY PRN
Status: DISCONTINUED | OUTPATIENT
Start: 2020-09-23 | End: 2020-09-29 | Stop reason: HOSPADM

## 2020-09-23 RX ORDER — PROCHLORPERAZINE MALEATE 10 MG
10 TABLET ORAL EVERY 6 HOURS PRN
Status: DISCONTINUED | OUTPATIENT
Start: 2020-09-23 | End: 2020-09-29 | Stop reason: HOSPADM

## 2020-09-23 RX ORDER — METOCLOPRAMIDE 5 MG/1
10 TABLET ORAL EVERY 6 HOURS PRN
Status: DISCONTINUED | OUTPATIENT
Start: 2020-09-23 | End: 2020-09-29 | Stop reason: HOSPADM

## 2020-09-23 RX ORDER — OXYCODONE HYDROCHLORIDE 15 MG/1
15 TABLET, FILM COATED, EXTENDED RELEASE ORAL EVERY 12 HOURS
Status: DISCONTINUED | OUTPATIENT
Start: 2020-09-23 | End: 2020-09-29 | Stop reason: HOSPADM

## 2020-09-23 RX ORDER — METOCLOPRAMIDE HYDROCHLORIDE 5 MG/ML
10 INJECTION INTRAMUSCULAR; INTRAVENOUS EVERY 6 HOURS PRN
Status: DISCONTINUED | OUTPATIENT
Start: 2020-09-23 | End: 2020-09-29 | Stop reason: HOSPADM

## 2020-09-23 RX ORDER — DEXAMETHASONE 1.5 MG/1
3 TABLET ORAL EVERY 8 HOURS SCHEDULED
Status: DISCONTINUED | OUTPATIENT
Start: 2020-09-24 | End: 2020-09-23

## 2020-09-23 RX ORDER — FENTANYL CITRATE 50 UG/ML
25-50 INJECTION, SOLUTION INTRAMUSCULAR; INTRAVENOUS
Status: DISCONTINUED | OUTPATIENT
Start: 2020-09-23 | End: 2020-09-23 | Stop reason: HOSPADM

## 2020-09-23 RX ORDER — LIDOCAINE 40 MG/G
CREAM TOPICAL
Status: DISCONTINUED | OUTPATIENT
Start: 2020-09-23 | End: 2020-09-29 | Stop reason: HOSPADM

## 2020-09-23 RX ORDER — DEXAMETHASONE 2 MG/1
2 TABLET ORAL EVERY 8 HOURS SCHEDULED
Status: DISCONTINUED | OUTPATIENT
Start: 2020-09-25 | End: 2020-09-23

## 2020-09-23 RX ORDER — ESCITALOPRAM OXALATE 10 MG/1
10 TABLET ORAL DAILY
Status: DISCONTINUED | OUTPATIENT
Start: 2020-09-23 | End: 2020-09-29 | Stop reason: HOSPADM

## 2020-09-23 RX ORDER — MAGNESIUM SULFATE HEPTAHYDRATE 40 MG/ML
4 INJECTION, SOLUTION INTRAVENOUS EVERY 4 HOURS PRN
Status: DISCONTINUED | OUTPATIENT
Start: 2020-09-23 | End: 2020-09-29 | Stop reason: HOSPADM

## 2020-09-23 RX ORDER — NALOXONE HYDROCHLORIDE 0.4 MG/ML
.1-.4 INJECTION, SOLUTION INTRAMUSCULAR; INTRAVENOUS; SUBCUTANEOUS
Status: ACTIVE | OUTPATIENT
Start: 2020-09-23 | End: 2020-09-24

## 2020-09-23 RX ORDER — LEVETIRACETAM 10 MG/ML
1000 INJECTION INTRAVASCULAR ONCE
Status: COMPLETED | OUTPATIENT
Start: 2020-09-23 | End: 2020-09-23

## 2020-09-23 RX ORDER — GENTAMICIN SULFATE 100 MG/100ML
1 INJECTION, SOLUTION INTRAVENOUS EVERY 8 HOURS
Status: DISCONTINUED | OUTPATIENT
Start: 2020-09-23 | End: 2020-09-24

## 2020-09-23 RX ORDER — ACETAMINOPHEN 325 MG/1
975 TABLET ORAL EVERY 8 HOURS
Status: DISPENSED | OUTPATIENT
Start: 2020-09-23 | End: 2020-09-26

## 2020-09-23 RX ORDER — POTASSIUM CHLORIDE 1500 MG/1
20-40 TABLET, EXTENDED RELEASE ORAL
Status: DISCONTINUED | OUTPATIENT
Start: 2020-09-23 | End: 2020-09-29 | Stop reason: HOSPADM

## 2020-09-23 RX ORDER — FLUTICASONE PROPIONATE 50 MCG
50 SPRAY, SUSPENSION (ML) NASAL DAILY
Status: DISCONTINUED | OUTPATIENT
Start: 2020-09-23 | End: 2020-09-29 | Stop reason: HOSPADM

## 2020-09-23 RX ORDER — ONDANSETRON 4 MG/1
4 TABLET, ORALLY DISINTEGRATING ORAL EVERY 30 MIN PRN
Status: DISCONTINUED | OUTPATIENT
Start: 2020-09-23 | End: 2020-09-23 | Stop reason: HOSPADM

## 2020-09-23 RX ORDER — GADOBUTROL 604.72 MG/ML
5 INJECTION INTRAVENOUS ONCE
Status: COMPLETED | OUTPATIENT
Start: 2020-09-23 | End: 2020-09-23

## 2020-09-23 RX ORDER — FENTANYL CITRATE 50 UG/ML
INJECTION, SOLUTION INTRAMUSCULAR; INTRAVENOUS PRN
Status: DISCONTINUED | OUTPATIENT
Start: 2020-09-23 | End: 2020-09-23

## 2020-09-23 RX ORDER — PHENYLEPHRINE HCL IN 0.9% NACL 50MG/250ML
0.5-6 PLASTIC BAG, INJECTION (ML) INTRAVENOUS CONTINUOUS
Status: DISCONTINUED | OUTPATIENT
Start: 2020-09-23 | End: 2020-09-24

## 2020-09-23 RX ORDER — POLYETHYLENE GLYCOL 3350 17 G/17G
17 POWDER, FOR SOLUTION ORAL DAILY
Status: DISCONTINUED | OUTPATIENT
Start: 2020-09-23 | End: 2020-09-29 | Stop reason: HOSPADM

## 2020-09-23 RX ORDER — NALOXONE HYDROCHLORIDE 0.4 MG/ML
.1-.4 INJECTION, SOLUTION INTRAMUSCULAR; INTRAVENOUS; SUBCUTANEOUS
Status: DISCONTINUED | OUTPATIENT
Start: 2020-09-23 | End: 2020-09-29 | Stop reason: HOSPADM

## 2020-09-23 RX ORDER — SODIUM CHLORIDE 9 MG/ML
INJECTION, SOLUTION INTRAVENOUS CONTINUOUS
Status: ACTIVE | OUTPATIENT
Start: 2020-09-23 | End: 2020-09-24

## 2020-09-23 RX ORDER — ONDANSETRON 2 MG/ML
4 INJECTION INTRAMUSCULAR; INTRAVENOUS EVERY 6 HOURS PRN
Status: DISCONTINUED | OUTPATIENT
Start: 2020-09-23 | End: 2020-09-29 | Stop reason: HOSPADM

## 2020-09-23 RX ORDER — LEVOFLOXACIN 500 MG/1
500 TABLET, FILM COATED ORAL DAILY
Status: DISCONTINUED | OUTPATIENT
Start: 2020-09-23 | End: 2020-09-28

## 2020-09-23 RX ORDER — LIDOCAINE HYDROCHLORIDE 20 MG/ML
INJECTION, SOLUTION INFILTRATION; PERINEURAL PRN
Status: DISCONTINUED | OUTPATIENT
Start: 2020-09-23 | End: 2020-09-23

## 2020-09-23 RX ORDER — OXYCODONE HYDROCHLORIDE 5 MG/1
5-10 TABLET ORAL
Status: DISCONTINUED | OUTPATIENT
Start: 2020-09-23 | End: 2020-09-29 | Stop reason: HOSPADM

## 2020-09-23 RX ORDER — DEXAMETHASONE SODIUM PHOSPHATE 4 MG/ML
10 INJECTION, SOLUTION INTRA-ARTICULAR; INTRALESIONAL; INTRAMUSCULAR; INTRAVENOUS; SOFT TISSUE ONCE
Status: COMPLETED | OUTPATIENT
Start: 2020-09-23 | End: 2020-09-23

## 2020-09-23 RX ORDER — ACETAMINOPHEN 325 MG/1
650 TABLET ORAL EVERY 4 HOURS PRN
Status: DISCONTINUED | OUTPATIENT
Start: 2020-09-26 | End: 2020-09-29 | Stop reason: HOSPADM

## 2020-09-23 RX ORDER — SODIUM CHLORIDE, SODIUM LACTATE, POTASSIUM CHLORIDE, CALCIUM CHLORIDE 600; 310; 30; 20 MG/100ML; MG/100ML; MG/100ML; MG/100ML
INJECTION, SOLUTION INTRAVENOUS CONTINUOUS
Status: DISCONTINUED | OUTPATIENT
Start: 2020-09-23 | End: 2020-09-23 | Stop reason: HOSPADM

## 2020-09-23 RX ORDER — AMOXICILLIN 250 MG
1 CAPSULE ORAL 2 TIMES DAILY
Status: DISCONTINUED | OUTPATIENT
Start: 2020-09-23 | End: 2020-09-29 | Stop reason: HOSPADM

## 2020-09-23 RX ORDER — LEVETIRACETAM 750 MG/1
750 TABLET ORAL 2 TIMES DAILY
Status: DISCONTINUED | OUTPATIENT
Start: 2020-09-23 | End: 2020-09-24

## 2020-09-23 RX ORDER — POTASSIUM CL/LIDO/0.9 % NACL 10MEQ/0.1L
10 INTRAVENOUS SOLUTION, PIGGYBACK (ML) INTRAVENOUS
Status: DISCONTINUED | OUTPATIENT
Start: 2020-09-23 | End: 2020-09-29 | Stop reason: HOSPADM

## 2020-09-23 RX ORDER — NALOXONE HYDROCHLORIDE 0.4 MG/ML
.1-.4 INJECTION, SOLUTION INTRAMUSCULAR; INTRAVENOUS; SUBCUTANEOUS
Status: DISCONTINUED | OUTPATIENT
Start: 2020-09-23 | End: 2020-09-26

## 2020-09-23 RX ORDER — LABETALOL HYDROCHLORIDE 5 MG/ML
10-40 INJECTION, SOLUTION INTRAVENOUS EVERY 10 MIN PRN
Status: DISCONTINUED | OUTPATIENT
Start: 2020-09-23 | End: 2020-09-25

## 2020-09-23 RX ORDER — PROCHLORPERAZINE 25 MG
25 SUPPOSITORY, RECTAL RECTAL EVERY 12 HOURS PRN
Status: DISCONTINUED | OUTPATIENT
Start: 2020-09-23 | End: 2020-09-29 | Stop reason: HOSPADM

## 2020-09-23 RX ORDER — ESMOLOL HYDROCHLORIDE 10 MG/ML
INJECTION INTRAVENOUS PRN
Status: DISCONTINUED | OUTPATIENT
Start: 2020-09-23 | End: 2020-09-23

## 2020-09-23 RX ORDER — ACETAMINOPHEN 325 MG/1
975 TABLET ORAL ONCE
Status: COMPLETED | OUTPATIENT
Start: 2020-09-23 | End: 2020-09-23

## 2020-09-23 RX ORDER — MANNITOL 20 G/100ML
1 INJECTION, SOLUTION INTRAVENOUS ONCE
Status: COMPLETED | OUTPATIENT
Start: 2020-09-23 | End: 2020-09-23

## 2020-09-23 RX ORDER — LISINOPRIL 10 MG/1
10 TABLET ORAL DAILY
Status: DISCONTINUED | OUTPATIENT
Start: 2020-09-24 | End: 2020-09-29 | Stop reason: HOSPADM

## 2020-09-23 RX ORDER — PROPOFOL 10 MG/ML
INJECTION, EMULSION INTRAVENOUS PRN
Status: DISCONTINUED | OUTPATIENT
Start: 2020-09-23 | End: 2020-09-23

## 2020-09-23 RX ORDER — METOCLOPRAMIDE HYDROCHLORIDE 5 MG/ML
10 INJECTION INTRAMUSCULAR; INTRAVENOUS ONCE
Status: COMPLETED | OUTPATIENT
Start: 2020-09-23 | End: 2020-09-23

## 2020-09-23 RX ORDER — AMOXICILLIN 250 MG
2 CAPSULE ORAL 2 TIMES DAILY
Status: DISCONTINUED | OUTPATIENT
Start: 2020-09-23 | End: 2020-09-29 | Stop reason: HOSPADM

## 2020-09-23 RX ORDER — POTASSIUM CHLORIDE 7.45 MG/ML
10 INJECTION INTRAVENOUS
Status: DISCONTINUED | OUTPATIENT
Start: 2020-09-23 | End: 2020-09-29 | Stop reason: HOSPADM

## 2020-09-23 RX ORDER — LABETALOL 20 MG/4 ML (5 MG/ML) INTRAVENOUS SYRINGE
10
Status: DISCONTINUED | OUTPATIENT
Start: 2020-09-23 | End: 2020-09-23 | Stop reason: HOSPADM

## 2020-09-23 RX ORDER — ONDANSETRON 4 MG/1
4 TABLET, ORALLY DISINTEGRATING ORAL EVERY 6 HOURS PRN
Status: DISCONTINUED | OUTPATIENT
Start: 2020-09-23 | End: 2020-09-29 | Stop reason: HOSPADM

## 2020-09-23 RX ORDER — DEXAMETHASONE 1 MG
1 TABLET ORAL EVERY 8 HOURS SCHEDULED
Status: DISCONTINUED | OUTPATIENT
Start: 2020-09-26 | End: 2020-09-23

## 2020-09-23 RX ORDER — POTASSIUM CHLORIDE 29.8 MG/ML
20 INJECTION INTRAVENOUS
Status: DISCONTINUED | OUTPATIENT
Start: 2020-09-23 | End: 2020-09-29 | Stop reason: HOSPADM

## 2020-09-23 RX ORDER — ONDANSETRON 2 MG/ML
INJECTION INTRAMUSCULAR; INTRAVENOUS PRN
Status: DISCONTINUED | OUTPATIENT
Start: 2020-09-23 | End: 2020-09-23

## 2020-09-23 RX ORDER — GADOBUTROL 604.72 MG/ML
7.5 INJECTION INTRAVENOUS ONCE
Status: COMPLETED | OUTPATIENT
Start: 2020-09-23 | End: 2020-09-23

## 2020-09-23 RX ORDER — DEXAMETHASONE 4 MG/1
4 TABLET ORAL EVERY 8 HOURS SCHEDULED
Status: DISCONTINUED | OUTPATIENT
Start: 2020-09-23 | End: 2020-09-24

## 2020-09-23 RX ORDER — ONDANSETRON 2 MG/ML
4 INJECTION INTRAMUSCULAR; INTRAVENOUS EVERY 30 MIN PRN
Status: DISCONTINUED | OUTPATIENT
Start: 2020-09-23 | End: 2020-09-23 | Stop reason: HOSPADM

## 2020-09-23 RX ORDER — POTASSIUM CHLORIDE 1.5 G/1.58G
20-40 POWDER, FOR SOLUTION ORAL
Status: DISCONTINUED | OUTPATIENT
Start: 2020-09-23 | End: 2020-09-29 | Stop reason: HOSPADM

## 2020-09-23 RX ADMIN — FENTANYL CITRATE 50 MCG: 50 INJECTION, SOLUTION INTRAMUSCULAR; INTRAVENOUS at 16:35

## 2020-09-23 RX ADMIN — DEXAMETHASONE SODIUM PHOSPHATE 10 MG: 4 INJECTION, SOLUTION INTRAMUSCULAR; INTRAVENOUS at 09:01

## 2020-09-23 RX ADMIN — AMINOLEVULINIC ACID HYDROCHLORIDE 2016 MG: 1500 POWDER, FOR SOLUTION ORAL at 07:53

## 2020-09-23 RX ADMIN — ACETAMINOPHEN 975 MG: 325 TABLET, FILM COATED ORAL at 06:32

## 2020-09-23 RX ADMIN — FENTANYL CITRATE 50 MCG: 50 INJECTION, SOLUTION INTRAMUSCULAR; INTRAVENOUS at 09:15

## 2020-09-23 RX ADMIN — ROCURONIUM BROMIDE 10 MG: 10 INJECTION INTRAVENOUS at 17:01

## 2020-09-23 RX ADMIN — GADOBUTROL 5 ML: 604.72 INJECTION INTRAVENOUS at 15:30

## 2020-09-23 RX ADMIN — OXYCODONE HYDROCHLORIDE 15 MG: 15 TABLET, FILM COATED, EXTENDED RELEASE ORAL at 23:09

## 2020-09-23 RX ADMIN — PROCHLORPERAZINE EDISYLATE 10 MG: 5 INJECTION INTRAMUSCULAR; INTRAVENOUS at 19:32

## 2020-09-23 RX ADMIN — PHENYLEPHRINE HYDROCHLORIDE 0.2 MCG/KG/MIN: 10 INJECTION INTRAVENOUS at 11:22

## 2020-09-23 RX ADMIN — SODIUM CHLORIDE, POTASSIUM CHLORIDE, SODIUM LACTATE AND CALCIUM CHLORIDE: 600; 310; 30; 20 INJECTION, SOLUTION INTRAVENOUS at 08:19

## 2020-09-23 RX ADMIN — VANCOMYCIN HYDROCHLORIDE 1750 MG: 10 INJECTION, POWDER, LYOPHILIZED, FOR SOLUTION INTRAVENOUS at 22:10

## 2020-09-23 RX ADMIN — PHENYLEPHRINE HYDROCHLORIDE 100 MCG: 10 INJECTION INTRAVENOUS at 11:22

## 2020-09-23 RX ADMIN — PROPOFOL 50 MG: 10 INJECTION, EMULSION INTRAVENOUS at 09:55

## 2020-09-23 RX ADMIN — PROPOFOL 30 MG: 10 INJECTION, EMULSION INTRAVENOUS at 08:42

## 2020-09-23 RX ADMIN — ROCURONIUM BROMIDE 20 MG: 10 INJECTION INTRAVENOUS at 15:05

## 2020-09-23 RX ADMIN — PROPOFOL 50 MG: 10 INJECTION, EMULSION INTRAVENOUS at 17:29

## 2020-09-23 RX ADMIN — GENTAMICIN SULFATE 100 MG: 100 INJECTION, SOLUTION INTRAVENOUS at 09:20

## 2020-09-23 RX ADMIN — ROCURONIUM BROMIDE 30 MG: 10 INJECTION INTRAVENOUS at 16:10

## 2020-09-23 RX ADMIN — METOCLOPRAMIDE HYDROCHLORIDE 10 MG: 5 INJECTION INTRAMUSCULAR; INTRAVENOUS at 18:45

## 2020-09-23 RX ADMIN — HYDRALAZINE HYDROCHLORIDE 10 MG: 20 INJECTION INTRAMUSCULAR; INTRAVENOUS at 22:20

## 2020-09-23 RX ADMIN — FAMOTIDINE 20 MG: 20 INJECTION, SOLUTION INTRAVENOUS at 20:05

## 2020-09-23 RX ADMIN — FENTANYL CITRATE 50 MCG: 50 INJECTION, SOLUTION INTRAMUSCULAR; INTRAVENOUS at 09:35

## 2020-09-23 RX ADMIN — PROPOFOL 20 MG: 10 INJECTION, EMULSION INTRAVENOUS at 08:46

## 2020-09-23 RX ADMIN — LEVETIRACETAM 1000 MG: 10 INJECTION INTRAVENOUS at 09:21

## 2020-09-23 RX ADMIN — LIDOCAINE HYDROCHLORIDE 50 MG: 20 INJECTION, SOLUTION INFILTRATION; PERINEURAL at 08:19

## 2020-09-23 RX ADMIN — FENTANYL CITRATE 50 MCG: 50 INJECTION, SOLUTION INTRAMUSCULAR; INTRAVENOUS at 10:04

## 2020-09-23 RX ADMIN — ESMOLOL HYDROCHLORIDE 30 MG: 10 INJECTION, SOLUTION INTRAVENOUS at 08:59

## 2020-09-23 RX ADMIN — FENTANYL CITRATE 50 MCG: 50 INJECTION, SOLUTION INTRAMUSCULAR; INTRAVENOUS at 17:24

## 2020-09-23 RX ADMIN — VANCOMYCIN HYDROCHLORIDE 1500 MG: 10 INJECTION, POWDER, LYOPHILIZED, FOR SOLUTION INTRAVENOUS at 09:20

## 2020-09-23 RX ADMIN — ESMOLOL HYDROCHLORIDE 10 MG: 10 INJECTION, SOLUTION INTRAVENOUS at 09:16

## 2020-09-23 RX ADMIN — FENTANYL CITRATE 100 MCG: 50 INJECTION, SOLUTION INTRAMUSCULAR; INTRAVENOUS at 08:19

## 2020-09-23 RX ADMIN — SUGAMMADEX 200 MG: 100 INJECTION, SOLUTION INTRAVENOUS at 17:53

## 2020-09-23 RX ADMIN — ROCURONIUM BROMIDE 80 MG: 10 INJECTION INTRAVENOUS at 08:19

## 2020-09-23 RX ADMIN — ROCURONIUM BROMIDE 20 MG: 10 INJECTION INTRAVENOUS at 15:30

## 2020-09-23 RX ADMIN — ROCURONIUM BROMIDE 10 MG: 10 INJECTION INTRAVENOUS at 13:11

## 2020-09-23 RX ADMIN — ACETAMINOPHEN 975 MG: 325 TABLET, FILM COATED ORAL at 21:35

## 2020-09-23 RX ADMIN — ONDANSETRON 4 MG: 2 INJECTION INTRAMUSCULAR; INTRAVENOUS at 17:56

## 2020-09-23 RX ADMIN — SODIUM CHLORIDE, POTASSIUM CHLORIDE, SODIUM LACTATE AND CALCIUM CHLORIDE: 600; 310; 30; 20 INJECTION, SOLUTION INTRAVENOUS at 18:20

## 2020-09-23 RX ADMIN — SODIUM CHLORIDE, POTASSIUM CHLORIDE, SODIUM LACTATE AND CALCIUM CHLORIDE: 600; 310; 30; 20 INJECTION, SOLUTION INTRAVENOUS at 09:30

## 2020-09-23 RX ADMIN — PROPOFOL 150 MG: 10 INJECTION, EMULSION INTRAVENOUS at 08:19

## 2020-09-23 RX ADMIN — FENTANYL CITRATE 50 MCG: 50 INJECTION, SOLUTION INTRAMUSCULAR; INTRAVENOUS at 17:32

## 2020-09-23 RX ADMIN — SODIUM CHLORIDE, POTASSIUM CHLORIDE, SODIUM LACTATE AND CALCIUM CHLORIDE: 600; 310; 30; 20 INJECTION, SOLUTION INTRAVENOUS at 10:07

## 2020-09-23 RX ADMIN — FENTANYL CITRATE 50 MCG: 50 INJECTION, SOLUTION INTRAMUSCULAR; INTRAVENOUS at 17:38

## 2020-09-23 RX ADMIN — ROCURONIUM BROMIDE 20 MG: 10 INJECTION INTRAVENOUS at 10:49

## 2020-09-23 RX ADMIN — SODIUM CHLORIDE: 9 INJECTION, SOLUTION INTRAVENOUS at 19:00

## 2020-09-23 RX ADMIN — SODIUM CHLORIDE, POTASSIUM CHLORIDE, SODIUM LACTATE AND CALCIUM CHLORIDE: 600; 310; 30; 20 INJECTION, SOLUTION INTRAVENOUS at 16:00

## 2020-09-23 RX ADMIN — GADOBUTROL 5 ML: 604.72 INJECTION INTRAVENOUS at 08:00

## 2020-09-23 RX ADMIN — ROCURONIUM BROMIDE 20 MG: 10 INJECTION INTRAVENOUS at 09:55

## 2020-09-23 RX ADMIN — ROCURONIUM BROMIDE 20 MG: 10 INJECTION INTRAVENOUS at 12:03

## 2020-09-23 RX ADMIN — MANNITOL 100 G: 20 INJECTION, SOLUTION INTRAVENOUS at 09:25

## 2020-09-23 RX ADMIN — ROCURONIUM BROMIDE 10 MG: 10 INJECTION INTRAVENOUS at 17:16

## 2020-09-23 RX ADMIN — ONDANSETRON 4 MG: 2 INJECTION INTRAMUSCULAR; INTRAVENOUS at 18:30

## 2020-09-23 RX ADMIN — HYDRALAZINE HYDROCHLORIDE 20 MG: 20 INJECTION INTRAMUSCULAR; INTRAVENOUS at 22:58

## 2020-09-23 ASSESSMENT — ACTIVITIES OF DAILY LIVING (ADL)
TOILETING: 0-->INDEPENDENT
TRANSFERRING: 0-->INDEPENDENT
RETIRED_COMMUNICATION: 0-->UNDERSTANDS/COMMUNICATES WITHOUT DIFFICULTY
AMBULATION: 1-->ASSISTIVE EQUIPMENT
FALL_HISTORY_WITHIN_LAST_SIX_MONTHS: NO
COGNITION: 1 - ATTENTION OR MEMORY DEFICITS
DRESS: 0-->INDEPENDENT
BATHING: 0-->INDEPENDENT
SWALLOWING: 0-->SWALLOWS FOODS/LIQUIDS WITHOUT DIFFICULTY
RETIRED_EATING: 0-->INDEPENDENT

## 2020-09-23 ASSESSMENT — VISUAL ACUITY
OU: NORMAL ACUITY

## 2020-09-23 ASSESSMENT — ENCOUNTER SYMPTOMS: DYSRHYTHMIAS: 0

## 2020-09-23 ASSESSMENT — MIFFLIN-ST. JEOR: SCORE: 1916

## 2020-09-23 NOTE — LETTER
Health Information Management Services               Recipient:          Sender:  CAMRYN Chery, [facility]  Adult Acute Care   [phone]          Date: September 25, 2020  Patient Name:  Gaetano Holley  Routing Message:            The documents accompanying this notice contain confidential information belonging to the sender.  This information is intended only for the use of the individual or entity named above.  The authorized recipient of this information is prohibited from disclosing this information to any other party and is required to destroy the information after its stated need has been fulfilled, unless otherwise required by state law.      If you are not the intended recipient, you are hereby notified that any disclosure, copy, distribution or action taken in reliance on the contents of these documents is strictly prohibited.  If you have received this document in error, please return it by fax to [phone] with a note on the cover sheet explaining why you are returning it (e.g. not your patient, not your provider, etc.).  If you need further assistance, please call [employer] Centralized Transcription at [phone].  Documents may also be returned by mail to [place], , [facility] [address], [place], [place] [address].

## 2020-09-23 NOTE — ANESTHESIA CARE TRANSFER NOTE
Patient: Gaetano Holley    Procedure(s):  INTRAOPERATIVE Magnetic Resonance Imaging CRANIOTOMY, USING OPTICAL TRACKING SYSTEM, [place]    Diagnosis: Glioblastoma (H) [C71.9]  Diagnosis Additional Information: No value filed.    Anesthesia Type:   General     Note:  Airway :Face Mask  Patient transferred to:PACU  Comments: VSS. Ventilating well.Handoff Report: Identifed the Patient, Identified the Reponsible Provider, Reviewed the pertinent medical history, Discussed the surgical course, Reviewed Intra-OP anesthesia mangement and issues during anesthesia, Set expectations for post-procedure period and Allowed opportunity for questions and acknowledgement of understanding      Vitals: (Last set prior to Anesthesia Care Transfer)    CRNA VITALS  9/23/2020 1742 - 9/23/2020 1827      9/23/2020             ART BP:  121/72    Ht Rate:  78                Electronically Signed By: PAUL Collins CRNA  September 23, 2020  6:27 PM

## 2020-09-23 NOTE — PROGRESS NOTES
"RECOVERY RN NOTE:  Assigned as pt nurse while pt recovering in PACU post procedure.   PACU orders released   Neurosurgery @ bedside assessing pt   Pt has 2 episodes of emesis Dr. June and Neurosurgery made aware of risk for aspiration. CXR ordered and preformed. Dr. June has reviewed. Zofran and Reglan administered (see MAR for details). Pt monitored and observed but nausea and vomiting appears to be improving.   Aromatherapy utilized as well.   Pt remains side lying on right with HOB elevated greater than 30 degrees. Suctions set up available.   @ 1926 Dr. Weiss with neurosurgery txt paged the following:  \"Gaetano Holley needs an op note placed prior to transferring to ICU. Please and thank you. [place] RN [phone]\"  Dr. June @ pt bedside states he will place sign out   Pt begins to vomit a 3rd time during neuro exam @ 1940. Compazine 10 IV given (see MAR for details)   Dr. June and neuro @ pt bedside @ 1950. Pt will continue to be monitored while in PACU. Aspiration precautions should be maintained   Ngueyn ROBERTS on 4A given report via telephone.   Dr. Haq @ pt bedside @ 2023 preforming neuro exam left side weaker than right side. Dr. Haq states he will call and update wife.   Kellie ROBERTS given report @ pt bedside she will be transferring pt out of PACU to CT prior to 4A.     "

## 2020-09-23 NOTE — ANESTHESIA PROCEDURE NOTES
Arterial Line Procedure Note      Staff -   Anesthesiologist:  Marvin June MD  Resident/Fellow: Luis Alfredo Brody MD  Performed By: resident  Procedure performed by resident/CRNA in presence of a teaching physician.      Location: In OR After Induction  Procedure Start/Stop Times:     patient identified, IV checked, site marked, risks and benefits discussed, informed consent, monitors and equipment checked, pre-op evaluation and at physician/surgeon's request      Correct Patient: Yes      Correct Position: Yes      Correct Site: Yes      Correct Procedure: Yes      Correct Laterality:  N/A    Site Marked:  N/A  Line Placement:     Procedure:  Arterial Line    Insertion Site:  Radial    Insertion laterality:  Right    Skin Prep: Chloraprep      Patient Prep: patient draped, mask, sterile gloves, sterile gown, hat and hand hygiene      Local skin infiltration:  None    Ultrasound Guided?: No      Catheter size:  20 gauge, Quick cath    Cath secured with: suture      Dressing:  Tegaderm    Complications:  None obvious    Arterial waveform: Yes      IBP within 10% of NIBP: Yes

## 2020-09-23 NOTE — LETTER
Transition Communication Hand-off for Care Transitions to Next Level of Care Provider    Name: Gaetano Holley  : 1970  MRN #: [id number]  Primary Care Provider: David Quiñonez     Primary Clinic: [facility] [address] [place] [address]     Reason for Hospitalization:  Glioblastoma (H) [C71.9]  Admit Date/Time: 2020  5:10 AM  Discharge Date:   2020  Payor Source: Payor: [facility] / Plan: [employer] / Product Type: HMO /              Reason for Communication Hand-off Referral:   Notification of the discharge plan    Discharge Plan:    Social Work Services Discharge Note      Patient Name:  Gaetano Holley     Anticipated Discharge Date:  2020     Discharge Disposition:   [facility] ([phone])     Following MD:  Facility  Assignment     Pre-Admission Screening (PAS) online form has been completed.  The Level of Care (LOC) is:  Determined  Confirmation Code is:  Not required as pt is admitting to ARU setting.        Additional Services/Equipment Arranged:  Shalini Sampson NP has confirmed readiness for discharge. Admissions at [facility] ([facility]) has confirmed acceptance for admit and receipt of prior auth from insurance.   Pt's wife to provide pt's transport with an estimated departure time  Of 12:20pm.      Patient / Family response to discharge plan:  Pt and wife voice understanding of the discharge plan and agreement with the discharge plan.     Persons notified of above discharge plan:  Patient, wife, 6A nursing and Shalini Sampson NP     Staff Discharge Instructions:  Please fax discharge orders and signed hard scripts for any controlled substances (SW completed this task)..  Please print a packet and send with patient.      CTS Handoff completed:  YES     Medicare Notice of Rights provided to the patient/family:  NO, as per Admissions Facesheet, pt is not on Medicare.     JAZMYN Mcduffie  Social Work, 6A  Phone:  [phone]  Pager:   [phone]  9/29/2020

## 2020-09-24 ENCOUNTER — APPOINTMENT (OUTPATIENT)
Dept: OCCUPATIONAL THERAPY | Facility: CLINIC | Age: 50
DRG: 025 | End: 2020-09-24
Attending: NEUROLOGICAL SURGERY
Payer: COMMERCIAL

## 2020-09-24 ENCOUNTER — APPOINTMENT (OUTPATIENT)
Dept: MRI IMAGING | Facility: CLINIC | Age: 50
DRG: 025 | End: 2020-09-24
Attending: NEUROLOGICAL SURGERY
Payer: COMMERCIAL

## 2020-09-24 ENCOUNTER — APPOINTMENT (OUTPATIENT)
Dept: PHYSICAL THERAPY | Facility: CLINIC | Age: 50
DRG: 025 | End: 2020-09-24
Attending: NEUROLOGICAL SURGERY
Payer: COMMERCIAL

## 2020-09-24 LAB
ANION GAP SERPL CALCULATED.3IONS-SCNC: 8 MMOL/L (ref 3–14)
ANION GAP SERPL CALCULATED.3IONS-SCNC: 9 MMOL/L (ref 3–14)
BUN SERPL-MCNC: 12 MG/DL (ref 7–30)
BUN SERPL-MCNC: 9 MG/DL (ref 7–30)
CA-I BLD-MCNC: 4.6 MG/DL (ref 4.4–5.2)
CALCIUM SERPL-MCNC: 8 MG/DL (ref 8.5–10.1)
CALCIUM SERPL-MCNC: 8 MG/DL (ref 8.5–10.1)
CHLORIDE SERPL-SCNC: 109 MMOL/L (ref 94–109)
CHLORIDE SERPL-SCNC: 113 MMOL/L (ref 94–109)
CO2 SERPL-SCNC: 21 MMOL/L (ref 20–32)
CO2 SERPL-SCNC: 23 MMOL/L (ref 20–32)
CREAT SERPL-MCNC: 0.62 MG/DL (ref 0.66–1.25)
CREAT SERPL-MCNC: 0.68 MG/DL (ref 0.66–1.25)
ERYTHROCYTE [DISTWIDTH] IN BLOOD BY AUTOMATED COUNT: 16.2 % (ref 10–15)
GFR SERPL CREATININE-BSD FRML MDRD: >90 ML/MIN/{1.73_M2}
GFR SERPL CREATININE-BSD FRML MDRD: >90 ML/MIN/{1.73_M2}
GLUCOSE BLDC GLUCOMTR-MCNC: 136 MG/DL (ref 70–99)
GLUCOSE SERPL-MCNC: 135 MG/DL (ref 70–99)
GLUCOSE SERPL-MCNC: 150 MG/DL (ref 70–99)
HCT VFR BLD AUTO: 36 % (ref 40–53)
HGB BLD-MCNC: 12.3 G/DL (ref 13.3–17.7)
MAGNESIUM SERPL-MCNC: 1.5 MG/DL (ref 1.6–2.3)
MAGNESIUM SERPL-MCNC: 2.9 MG/DL (ref 1.6–2.3)
MCH RBC QN AUTO: 30.8 PG (ref 26.5–33)
MCHC RBC AUTO-ENTMCNC: 34.2 G/DL (ref 31.5–36.5)
MCV RBC AUTO: 90 FL (ref 78–100)
PHOSPHATE SERPL-MCNC: 2.7 MG/DL (ref 2.5–4.5)
PHOSPHATE SERPL-MCNC: 2.9 MG/DL (ref 2.5–4.5)
PLATELET # BLD AUTO: 214 10E9/L (ref 150–450)
POTASSIUM SERPL-SCNC: 3.3 MMOL/L (ref 3.4–5.3)
POTASSIUM SERPL-SCNC: 3.4 MMOL/L (ref 3.4–5.3)
RBC # BLD AUTO: 3.99 10E12/L (ref 4.4–5.9)
SODIUM SERPL-SCNC: 139 MMOL/L (ref 133–144)
SODIUM SERPL-SCNC: 144 MMOL/L (ref 133–144)
WBC # BLD AUTO: 14.7 10E9/L (ref 4–11)

## 2020-09-24 PROCEDURE — 25000128 H RX IP 250 OP 636: Performed by: STUDENT IN AN ORGANIZED HEALTH CARE EDUCATION/TRAINING PROGRAM

## 2020-09-24 PROCEDURE — 25000125 ZZHC RX 250: Performed by: STUDENT IN AN ORGANIZED HEALTH CARE EDUCATION/TRAINING PROGRAM

## 2020-09-24 PROCEDURE — A9585 GADOBUTROL INJECTION: HCPCS | Performed by: NEUROLOGICAL SURGERY

## 2020-09-24 PROCEDURE — 97110 THERAPEUTIC EXERCISES: CPT | Mod: GO | Performed by: OCCUPATIONAL THERAPIST

## 2020-09-24 PROCEDURE — 93005 ELECTROCARDIOGRAM TRACING: CPT

## 2020-09-24 PROCEDURE — 97530 THERAPEUTIC ACTIVITIES: CPT | Mod: GO | Performed by: OCCUPATIONAL THERAPIST

## 2020-09-24 PROCEDURE — 83735 ASSAY OF MAGNESIUM: CPT | Performed by: STUDENT IN AN ORGANIZED HEALTH CARE EDUCATION/TRAINING PROGRAM

## 2020-09-24 PROCEDURE — 40000556 ZZH STATISTIC PERIPHERAL IV START W US GUIDANCE

## 2020-09-24 PROCEDURE — 25500064 ZZH RX 255 OP 636: Performed by: NEUROLOGICAL SURGERY

## 2020-09-24 PROCEDURE — 85027 COMPLETE CBC AUTOMATED: CPT | Performed by: STUDENT IN AN ORGANIZED HEALTH CARE EDUCATION/TRAINING PROGRAM

## 2020-09-24 PROCEDURE — 97162 PT EVAL MOD COMPLEX 30 MIN: CPT | Mod: GP

## 2020-09-24 PROCEDURE — 82330 ASSAY OF CALCIUM: CPT | Performed by: STUDENT IN AN ORGANIZED HEALTH CARE EDUCATION/TRAINING PROGRAM

## 2020-09-24 PROCEDURE — 80048 BASIC METABOLIC PNL TOTAL CA: CPT | Performed by: STUDENT IN AN ORGANIZED HEALTH CARE EDUCATION/TRAINING PROGRAM

## 2020-09-24 PROCEDURE — 25000131 ZZH RX MED GY IP 250 OP 636 PS 637: Performed by: STUDENT IN AN ORGANIZED HEALTH CARE EDUCATION/TRAINING PROGRAM

## 2020-09-24 PROCEDURE — 40000275 ZZH STATISTIC RCP TIME EA 10 MIN

## 2020-09-24 PROCEDURE — 70553 MRI BRAIN STEM W/O & W/DYE: CPT

## 2020-09-24 PROCEDURE — 97530 THERAPEUTIC ACTIVITIES: CPT | Mod: GP

## 2020-09-24 PROCEDURE — 25000128 H RX IP 250 OP 636: Performed by: NEUROLOGICAL SURGERY

## 2020-09-24 PROCEDURE — 84100 ASSAY OF PHOSPHORUS: CPT | Performed by: STUDENT IN AN ORGANIZED HEALTH CARE EDUCATION/TRAINING PROGRAM

## 2020-09-24 PROCEDURE — 20000004 ZZH R&B ICU UMMC

## 2020-09-24 PROCEDURE — 00000146 ZZHCL STATISTIC GLUCOSE BY METER IP

## 2020-09-24 PROCEDURE — 93010 ELECTROCARDIOGRAM REPORT: CPT | Performed by: INTERNAL MEDICINE

## 2020-09-24 PROCEDURE — 25000132 ZZH RX MED GY IP 250 OP 250 PS 637: Performed by: STUDENT IN AN ORGANIZED HEALTH CARE EDUCATION/TRAINING PROGRAM

## 2020-09-24 PROCEDURE — 97535 SELF CARE MNGMENT TRAINING: CPT | Mod: GO | Performed by: OCCUPATIONAL THERAPIST

## 2020-09-24 PROCEDURE — 97116 GAIT TRAINING THERAPY: CPT | Mod: GP

## 2020-09-24 PROCEDURE — 40000014 ZZH STATISTIC ARTERIAL MONITORING DAILY

## 2020-09-24 PROCEDURE — 25800030 ZZH RX IP 258 OP 636: Performed by: NEUROLOGICAL SURGERY

## 2020-09-24 PROCEDURE — 97165 OT EVAL LOW COMPLEX 30 MIN: CPT | Mod: GO | Performed by: OCCUPATIONAL THERAPIST

## 2020-09-24 RX ORDER — DEXAMETHASONE 2 MG/1
2 TABLET ORAL EVERY 8 HOURS SCHEDULED
Status: DISCONTINUED | OUTPATIENT
Start: 2020-09-28 | End: 2020-09-25

## 2020-09-24 RX ORDER — DEXAMETHASONE 4 MG/1
4 TABLET ORAL EVERY 8 HOURS SCHEDULED
Status: DISCONTINUED | OUTPATIENT
Start: 2020-09-24 | End: 2020-09-24

## 2020-09-24 RX ORDER — DEXAMETHASONE 1.5 MG/1
3 TABLET ORAL EVERY 8 HOURS SCHEDULED
Status: DISCONTINUED | OUTPATIENT
Start: 2020-09-26 | End: 2020-09-24

## 2020-09-24 RX ORDER — GADOBUTROL 604.72 MG/ML
10 INJECTION INTRAVENOUS ONCE
Status: COMPLETED | OUTPATIENT
Start: 2020-09-24 | End: 2020-09-24

## 2020-09-24 RX ORDER — DEXAMETHASONE 1.5 MG/1
3 TABLET ORAL EVERY 8 HOURS SCHEDULED
Status: DISCONTINUED | OUTPATIENT
Start: 2020-09-26 | End: 2020-09-25

## 2020-09-24 RX ORDER — METHYLPHENIDATE HYDROCHLORIDE 5 MG/1
5 TABLET ORAL 2 TIMES DAILY
Status: DISCONTINUED | OUTPATIENT
Start: 2020-09-25 | End: 2020-09-29 | Stop reason: HOSPADM

## 2020-09-24 RX ORDER — SODIUM CHLORIDE, SODIUM GLUCONATE, SODIUM ACETATE, POTASSIUM CHLORIDE AND MAGNESIUM CHLORIDE 526; 502; 368; 37; 30 MG/100ML; MG/100ML; MG/100ML; MG/100ML; MG/100ML
145 INJECTION, SOLUTION INTRAVENOUS CONTINUOUS
Status: DISCONTINUED | OUTPATIENT
Start: 2020-09-24 | End: 2020-09-28

## 2020-09-24 RX ORDER — LEVETIRACETAM 750 MG/1
1500 TABLET ORAL 2 TIMES DAILY
Status: DISCONTINUED | OUTPATIENT
Start: 2020-09-24 | End: 2020-09-29 | Stop reason: HOSPADM

## 2020-09-24 RX ORDER — DEXAMETHASONE 1 MG
1 TABLET ORAL EVERY 8 HOURS SCHEDULED
Status: DISCONTINUED | OUTPATIENT
Start: 2020-09-30 | End: 2020-09-24

## 2020-09-24 RX ORDER — DEXAMETHASONE 1 MG
1 TABLET ORAL EVERY 8 HOURS SCHEDULED
Status: DISCONTINUED | OUTPATIENT
Start: 2020-09-30 | End: 2020-09-25

## 2020-09-24 RX ORDER — SODIUM CHLORIDE, SODIUM GLUCONATE, SODIUM ACETATE, POTASSIUM CHLORIDE AND MAGNESIUM CHLORIDE 526; 502; 368; 37; 30 MG/100ML; MG/100ML; MG/100ML; MG/100ML; MG/100ML
1000 INJECTION, SOLUTION INTRAVENOUS ONCE
Status: COMPLETED | OUTPATIENT
Start: 2020-09-24 | End: 2020-09-24

## 2020-09-24 RX ORDER — DEXAMETHASONE 4 MG/1
4 TABLET ORAL EVERY 8 HOURS SCHEDULED
Status: DISCONTINUED | OUTPATIENT
Start: 2020-09-24 | End: 2020-09-25

## 2020-09-24 RX ORDER — DEXAMETHASONE 2 MG/1
2 TABLET ORAL EVERY 8 HOURS SCHEDULED
Status: DISCONTINUED | OUTPATIENT
Start: 2020-09-28 | End: 2020-09-24

## 2020-09-24 RX ADMIN — GADOBUTROL 10 ML: 604.72 INJECTION INTRAVENOUS at 17:18

## 2020-09-24 RX ADMIN — ACETAMINOPHEN 975 MG: 325 TABLET, FILM COATED ORAL at 12:28

## 2020-09-24 RX ADMIN — OXYCODONE HYDROCHLORIDE 15 MG: 15 TABLET, FILM COATED, EXTENDED RELEASE ORAL at 10:14

## 2020-09-24 RX ADMIN — SODIUM CHLORIDE, SODIUM GLUCONATE, SODIUM ACETATE, POTASSIUM CHLORIDE AND MAGNESIUM CHLORIDE 125 ML/HR: 526; 502; 368; 37; 30 INJECTION, SOLUTION INTRAVENOUS at 08:10

## 2020-09-24 RX ADMIN — OXYCODONE HYDROCHLORIDE 15 MG: 15 TABLET, FILM COATED, EXTENDED RELEASE ORAL at 22:19

## 2020-09-24 RX ADMIN — SODIUM CHLORIDE, SODIUM GLUCONATE, SODIUM ACETATE, POTASSIUM CHLORIDE AND MAGNESIUM CHLORIDE 1000 ML: 526; 502; 368; 37; 30 INJECTION, SOLUTION INTRAVENOUS at 04:19

## 2020-09-24 RX ADMIN — LEVETIRACETAM 1500 MG: 750 TABLET, FILM COATED ORAL at 19:34

## 2020-09-24 RX ADMIN — LEVOFLOXACIN 500 MG: 500 TABLET, FILM COATED ORAL at 19:34

## 2020-09-24 RX ADMIN — DOCUSATE SODIUM 50 MG AND SENNOSIDES 8.6 MG 2 TABLET: 8.6; 5 TABLET, FILM COATED ORAL at 19:34

## 2020-09-24 RX ADMIN — VANCOMYCIN HYDROCHLORIDE 1750 MG: 10 INJECTION, POWDER, LYOPHILIZED, FOR SOLUTION INTRAVENOUS at 22:20

## 2020-09-24 RX ADMIN — PROCHLORPERAZINE EDISYLATE 10 MG: 5 INJECTION INTRAMUSCULAR; INTRAVENOUS at 02:03

## 2020-09-24 RX ADMIN — Medication 10 MEQ: at 02:08

## 2020-09-24 RX ADMIN — LEVETIRACETAM 750 MG: 750 TABLET, FILM COATED ORAL at 08:30

## 2020-09-24 RX ADMIN — PROCHLORPERAZINE EDISYLATE 10 MG: 5 INJECTION INTRAMUSCULAR; INTRAVENOUS at 08:27

## 2020-09-24 RX ADMIN — OXYCODONE HYDROCHLORIDE 10 MG: 5 TABLET ORAL at 08:30

## 2020-09-24 RX ADMIN — Medication 10 MEQ: at 03:10

## 2020-09-24 RX ADMIN — Medication 10 MEQ: at 04:11

## 2020-09-24 RX ADMIN — ONDANSETRON 4 MG: 2 INJECTION INTRAMUSCULAR; INTRAVENOUS at 00:14

## 2020-09-24 RX ADMIN — DEXAMETHASONE 4 MG: 4 TABLET ORAL at 06:12

## 2020-09-24 RX ADMIN — DEXAMETHASONE 4 MG: 4 TABLET ORAL at 17:41

## 2020-09-24 RX ADMIN — ACETAMINOPHEN 975 MG: 325 TABLET, FILM COATED ORAL at 19:34

## 2020-09-24 RX ADMIN — MAGNESIUM SULFATE IN WATER 4 G: 40 INJECTION, SOLUTION INTRAVENOUS at 04:11

## 2020-09-24 RX ADMIN — VANCOMYCIN HYDROCHLORIDE 1750 MG: 10 INJECTION, POWDER, LYOPHILIZED, FOR SOLUTION INTRAVENOUS at 10:23

## 2020-09-24 RX ADMIN — Medication 10 MEQ: at 05:13

## 2020-09-24 RX ADMIN — LABETALOL 20 MG/4 ML (5 MG/ML) INTRAVENOUS SYRINGE 20 MG: at 08:47

## 2020-09-24 RX ADMIN — OMEPRAZOLE 20 MG: 20 CAPSULE, DELAYED RELEASE ORAL at 08:30

## 2020-09-24 RX ADMIN — GENTAMICIN SULFATE 100 MG: 100 INJECTION, SOLUTION INTRAVENOUS at 00:17

## 2020-09-24 RX ADMIN — DEXAMETHASONE 4 MG: 4 TABLET ORAL at 22:19

## 2020-09-24 RX ADMIN — LISINOPRIL 10 MG: 10 TABLET ORAL at 08:30

## 2020-09-24 RX ADMIN — ESCITALOPRAM 10 MG: 10 TABLET, FILM COATED ORAL at 08:29

## 2020-09-24 ASSESSMENT — PAIN DESCRIPTION - DESCRIPTORS
DESCRIPTORS: HEADACHE
DESCRIPTORS: ACHING
DESCRIPTORS: HEADACHE
DESCRIPTORS: HEADACHE

## 2020-09-24 ASSESSMENT — ACTIVITIES OF DAILY LIVING (ADL)
ADLS_ACUITY_SCORE: 18
ADLS_ACUITY_SCORE: 18
ADLS_ACUITY_SCORE: 20
ADLS_ACUITY_SCORE: 18

## 2020-09-24 ASSESSMENT — MIFFLIN-ST. JEOR: SCORE: 1909

## 2020-09-24 NOTE — PROGRESS NOTES
"   09/24/20 1100   Quick Adds   Type of Visit Initial Occupational Therapy Evaluation   Living Environment   Lives With spouse   Living Arrangements house   Home Accessibility stairs to enter home;stairs within home   Number of Stairs, Main Entrance 3   Living Environment Comment Pt lives in [facility] with wife and dependent child.    Stairs Within Home, Primary   Stairs, Within Home, Primary   (10)   Functional Level   Ambulation 1-->assistive equipment   Transferring 0-->independent   Toileting 0-->independent   Bathing 0-->independent   Dressing 0-->independent   Eating 0-->independent   Communication 0-->understands/communicates without difficulty   Swallowing 0-->swallows foods/liquids without difficulty   Cognition 1 - attention or memory deficits   Fall history within last six months no   Prior Functional Level Comment pt has AE in BR/shower however has not needed them. Pt uses cane intermittently for ambulation when feeling \"tired\".    General Information   Referring Physician Trevor Holley   Patient/Family Goals Statement return home and walk again, increase I with ADL as able.    Additional Occupational Profile Info/Pertinent History of Current Problem Gaetano Holley is a 50 year old male POD #1 (9/23/20) s/p right frontal tumor resection. Post operatively was dealing with nausea, now under control   Precautions/Limitations fall precautions  (craniotomy )   General Observations pt with supportive SO present.    Cognitive Status Examination   Orientation person;place   Level of Consciousness lethargic/somnolent   Follows Commands (Cognition) follows one step commands   Memory impaired   Cognitive Comment further testing required. pt able to follow basic L/R discrimination however with significant lag on L.    Visual Perception   Visual Perception Comments difficult to assess, visual field with possible cut on L approx 45 degreeg from midline. Pt unable to read clock on wall from approx 15 feet saying " 2:10 when it was 9:10   Sensory Examination   Sensory Comments pt with sensory loss in L hand, able to sense light touch approx 50% of time, difficult to assess as processing slow and sensation may be under reported. Proprioception in L foot/ankle diminished.    Range of Motion (ROM)   ROM Quick Adds No deficits were identified   Strength   Strength Comments pt grossly 2+to3+/5 on L UE and 4/5 R   Hand Strength   Hand Strength Comments L hand severely limited, R hand WFL.    Coordination   Coordination Comments L UE with gross and fine motor deficits. R also sluggish in post operative state.    Transfer Skill: Bed to Chair/Chair to Bed   Level of [place]by: Bed to Chair maximum assist (25% patients effort)   Transfer Skill: Sit to Stand   Level of [place]: Sit/Stand minimum assist (75% patients effort)   Lower Body Dressing   Level of Rigby: Dress Lower Body maximum assist (25% patients effort)   Instrumental Activities of Daily Living (IADL)   Previous Responsibilities   (pt able to walk child to school approx 1 block. )   Activities of Daily Living Analysis   Impairments Contributing to Impaired Activities of Daily Living balance impaired;cognition impaired;coordination impaired;motor control impaired;post surgical precautions;postural control impaired;sensation decreased;sensory feedback impaired;strength decreased   General Therapy Interventions   Planned Therapy Interventions ADL retraining;IADL retraining;cognition;fine motor coordination training;joint mobilization;manual therapy;motor coordination training;neuromuscular re-education;ROM;strengthening;transfer training;visual perception;home program guidelines;progressive activity/exercise;risk factor education   Clinical Impression   Criteria for Skilled Therapeutic Interventions Met yes, treatment indicated   OT Diagnosis decreased ADL I   Assessment of Occupational Performance 5 or more Performance Deficits   Identified Performance Deficits  "dressing, bathing, toileting, home making, cooking, work.    Clinical Decision Making (Complexity) Low complexity   Therapy Frequency 5x/week   Predicted Duration of Therapy Intervention (days/wks) 3 weeks   Anticipated Discharge Disposition Transitional Care Facility   Risks and Benefits of Treatment have been explained. Yes   Patient, Family & other staff in agreement with plan of care Yes   Clinical Impression Comments Pt presents to OT with post surgical precautions, hemiparesis, decreased motor control all leading to decreased ADL I. pt to benefit from skilled OT intervention to address the above problem list. Pt lives with SO and dependent child in [place] and is able to ambulate with a cane and often walks child to school. Over the past few weeks pt has lost \"drive\" to ambulate and has been spending significant time sitting on couch.    [facility] TM \"6 Clicks\"   2016, Trustees of [facility], under license to [employer].  All rights reserved.   6 Clicks Short Forms Daily Activity Inpatient Short Form   [facility]  \"6 Clicks\" Daily Activity Inpatient Short Form   1. Putting on and taking off regular lower body clothing? 2 - A Lot   2. Bathing (including washing, rinsing, drying)? 2 - A Lot   3. Toileting, which includes using toilet, bedpan or urinal? 2 - A Lot   4. Putting on and taking off regular upper body clothing? 2 - A Lot   5. Taking care of personal grooming such as brushing teeth? 2 - A Lot   6. Eating meals? 2 - A Lot   Daily Activity Raw Score (Score out of 24.Lower scores equate to lower levels of function) 12   Total Evaluation Time   Total Evaluation Time (Minutes) 5     "

## 2020-09-24 NOTE — DISCHARGE SUMMARY
[facility] Discharge Summary and Instructions    Gaetano Holley MRN# [id number]   Age: 50 year old YOB: 1970     Date of Admission:  9/23/2020  Date of Discharge::  9/29/2020  Admitting Physician:  Raul Haq MD  Discharge Physician:  Raul Haq MD          Admission Diagnoses:     Glioblastoma (H) [C71.9]          Discharge Diagnosis:     Glioblastoma (H) [C71.9]          Procedures:     9/23/2020: Stealth and 5-ALA-assisted right craniotomy for tumor resection, with intraoperative MRI           Brief History of Illness:     Mr. Gaetano Holley is a 50 year-old male with past medical history significant for right frontal glioblastoma (5.5 x 5.0 cm), initially diagnosed in 1/2020 after presenting with seizure. Soon after, he underwent right frontal craniotomy and radical subtotal resection, with surgical pathology revealed IDH wildtype and MGMT promoter methylation status negative glioblastoma (GBM). Molecular testing revealed TERT and EGFR mutations. Initial surgical resection was followed by adjuvant temozolomide (TMZ) and radiation (6,000 cGy), which ended in March. Mr. Holley was subsequently started on maintenance TMZ and Optune. However, surveillance MRI in July 2020 was concerning for progression, indicated by a 1.1 cm contrast-enhancing lesion in the superior right fontal lobe, adjacent to the resection cavity, and additional lesions along the periphery of the main lesion. In the postoperative setting, Mr. Holley's neurological exam was notable for left upper and lower extremity weakness, requiring a cane for ambulation, and flat affect. Shortly thereafter, he began having persistent, dexamethasone-refractory headaches. As such, Mr. Holley was referred to [facility] for further management. Radiation Oncology, Neurosurgery, and Tumor Board all provided recommendations and ultimately two conclusions emerged 1) 5-ALA-guided surgical resection as  determined to be the next best step in management, and 2) not a good candidate for GammaTile placement given diffuse nature of the lesion. To that end, Mr. Holley presents to [facility] on 9/23/2020 for scheduled right craniotomy and surgical resection of the likely recurrent GBM.          Hospital Course:     Mr. Holley underwent 5-ALA and Stealth-assisted right craniotomy and surgical resection on 9/23/2020. Intra-operative MRI was also performed and confirmed gross total resection. Frozen specimens sent intraoperatively revealed recurrent GBM, high grade (WHO Grade 4). The operation was uncomplicated and Mr. Holley was successfully extubated in the OR, with recovery following in the PACU, notable for post-operative nausea and vomiting, managed with anti-emetics and further subsiding over time during his hospital course. Afterwards, Mr. Holley was admitted to the  Neuro-Surgical ICU for routine post-operative cares.     His post-operative neurological exam demonstrated left upper and left lower weakness (consistent with pre-operative status), flat affect as well as new-onset SMA syndrome. His new-onset SMA syndrome did gradually improve throughout the course of his hospitalization. An antibiotic regimen of vancomycin and levofloxacin was initiated post-operatively and continued throughout his hospital stay. Mr. Holley continued on his PTA levetiracetam, and will discharge on a 13-day dexamethasone taper.    Post-operative CT on POD#0 was notable for hydrocephalus, which was managed via oxygen supplementation via face mask. Mr. Holley's persistent nausea prevented adequate PO intake and thus IV fluids were continued for several days following surgery.     Blood pressure was able to be controlled via infrequent PRN anti-hypertensives, primarily used in the immediate POD#0-POD#2 phase. On POD#2 9/25/2020, Mr. Holley's arterial line and weiss catheter were removed. While he was able to void effectively  using a urinal, he did also require condom catheterization at times. However, on POD#2, Mr. Holley triggered the sepsis screening alert via SIRS criteria. Lactate elevation and leukocytosis both responded well to fluid bolus, and was within normal limits by POD#3 9/26/2020 AM. No further evidence of infection was revealed and Mr. Holley remained clinically stable for the duration of his hospital course.  Patient will be discharged on a 7 day course of oral Clindamycin for wound prophylaxis.      PT and OT followed, collectively recommending ARU. On POD#6 9/29/2020, Mr. Holley was tolerating a PO diet, not requiring IV fluids, reporting minimal nausea, managing his pain well via minimal PO medications, voiding consistently without a catheter, cleared to discharge to rehabilitation via therapies, and had scheduled follow up in place. As such, Mr. Holley was discharged to ARU on POD#6.     Prior to discharge patient was medically stable, improving neurologically, mobilizing with assistance, voiding, tolerating oral diet, and passing bowel movements.     Patient will follow-up with Dr. Raul Haq on 10/02/2020 via video visit to discuss future treatment options.            Discharge Medications:     Current Discharge Medication List      CONTINUE these medications which have NOT CHANGED    Details   ciclopirox (LOPROX) 0.77 % cream Apply 0.77 Containers topically as needed      dexamethasone (DECADRON) 4 MG tablet Take 4 mg by mouth daily      escitalopram (LEXAPRO) 10 MG tablet Take 10 mg by mouth daily      fluticasone (FLONASE) 50 MCG/ACT nasal spray Spray 50 sprays into both nostrils daily      levETIRAcetam (KEPPRA) 750 MG tablet Take 750 mg by mouth 2 times daily       lisinopril (ZESTRIL) 10 MG tablet Take 10 mg by mouth daily      methylphenidate (RITALIN) 5 MG tablet Take 5 mg by mouth 2 times daily      omeprazole (PRILOSEC) 20 MG DR capsule TAKE 2 CAPSULES BY MOUTH ONCE DAILY BEFORE MEAL(S) -  DO  NOT   CRUSH      ondansetron (ZOFRAN) 8 MG tablet Take 8 mg by mouth daily      oxyCODONE (XTAMPZA ER) 13.5 MG 12 hr tablet       sildenafil (REVATIO) 20 MG tablet Take 20 mg by mouth as needed      GLEOSTINE 100 MG capsule       oxyCODONE (ROXICODONE) 5 MG tablet Take 5 mg by mouth as needed      oxyCODONE-acetaminophen (PERCOCET) 7.5-325 MG per tablet Take 7.5-325 tablets by mouth as needed      prochlorperazine (COMPAZINE) 10 MG tablet Take 10 mg by mouth as needed                Discharge Instructions and Follow-Up:     Discharge diet: Regular   Discharge activity: You may advance activity as tolerated. No strenuous exercise or heay lifting greater than 10 lbs for 4 weeks or until seen and cleared in clinic.   Discharge follow-up: Follow-up with Dr. Raul Haq on 10/02/2020 via video visit     Wound care: Ok to shower,however no scrubbing of the wound and no soaking of the wound, meaning no bathtubs or swimming pools. Pat dry only. Leave wound open to air.  Patient to have wound checked 2 weeks following surgery.    Wound location: right fronto-temporal  Closure technique: 3-0 Monocryl with Exofin (absorbable sutures)   Dressing needs: None  Post-op care at follow-up: Keep wound clean and dry      Please call if you have:  1. increased pain, redness, drainage, swelling at your incision  2. fevers > 101.5 F degrees  3. with any questions or concerns.  You may reach the Neurosurgery clinic at [phone] during regular work hours. ER at [phone].    and ask for the Neurosurgery Resident on call at [phone], during off hours or weekends.         Discharge Disposition:     Discharged to PAUL Diaz, CNP  Department of Neurosurgery  Pager: [phone]

## 2020-09-24 NOTE — PHARMACY-VANCOMYCIN DOSING SERVICE
Pharmacy Vancomycin Initial Note  Date of Service 2020  Patient's  1970  50 year old, male    Indication: Surgical Prophylaxis    Current estimated CrCl = CrCl cannot be calculated (No successful lab value found.).    Creatinine for last 3 days  No results found for requested labs within last 72 hours.    Recent Vancomycin Level(s) for last 3 days  No results found for requested labs within last 72 hours.      Vancomycin IV Administrations (past 72 hours)                   vancomycin 1500 mg in 0.9% NaCl 250 ml intermittent infusion 1,500 mg (mg) 1,500 mg Given 20 0920                Nephrotoxins and other renal medications (From now, onward)    Start     Dose/Rate Route Frequency Ordered Stop    20 0800  lisinopril (ZESTRIL) tablet 10 mg      10 mg Oral DAILY 20 1109      20 2200  vancomycin (VANCOCIN) 1,750 mg in sodium chloride 0.9 % 500 mL intermittent infusion      1,750 mg  over 2 Hours Intravenous EVERY 12 HOURS 20 2132      20 0730  gentamicin (GARAMYCIN) infusion 100 mg      1 mg/kg × 100.8 kg  over 60 Minutes Intravenous EVERY 8 HOURS 20 0713            Contrast Orders - past 72 hours (72h ago, onward)    Start     Dose/Rate Route Frequency Ordered Stop    20 1530  gadobutrol (GADAVIST) injection 5 mL      5 mL Intravenous ONCE 20 1507 20 1530                Plan:  1.  Vancomycin 1500mg given preop x1 20 @ 0920, start vancomycin 1750mg IV q12 hours.   2.  Goal Trough Level: 15-20 mg/L   3.  Pharmacy will check trough levels as appropriate in 1-3 Days.    4. Serum creatinine levels will be ordered daily for the first week of therapy and at least twice weekly for subsequent weeks.    5. [place] method utilized to dose vancomycin therapy: Method 2    Ernesto Chaudhry, PharmD, BCPS

## 2020-09-24 NOTE — PROGRESS NOTES
[facility], [place]   09/24/2020  Neurosurgery Progress Note:    Assessment:  Gaetano Holley is a 50 year old male POD #1 (9/23/20) s/p right frontal tumor resection. Post operatively was dealing with nausea, now under control.    Plan:  - Serial neuro exams  - Pain control  - PTA keppra  - Dex taper  - Vanc and Levaquin for perioperative prophylaxis  - HOB > 30 degrees  - Advance diet as tolerated  - Bowel regimen  - PRN antiemetics  - IVF until taking adequate PO  - PT/OT  - SCDs for DVT proph    -----------------------------------  Isela Berger MD  Neurosurgery Resident, PGY-2    Please contact neurosurgery resident on call with questions.    Dial * * [phone], enter [phone] when prompted.   -----------------------------------    Interval History: OR yesterday with post-operative nausea and vomiting.    Objective:   Temp:  [99.1  F (37.3  C)-100.6  F (38.1  C)] 99.1  F (37.3  C)  Pulse:  [] 130  Resp:  [16-22] 22  BP: ()/() 133/100  MAP:  [91 mmHg-158 mmHg] 99 mmHg  Arterial Line BP: (123-187)/() 136/82  SpO2:  [95 %-100 %] 97 %  I/O last 3 completed shifts:  In: 5881.67 [P.O.:240; I.V.:4641.67]  Out: 4035 [Urine:4035]    Gen: Appears comfortable, NAD  Wound: clean, dry, intact  Neurologic:  - Alert & Oriented to person, place, year, and situation  - Follows commands  - No aphasia or dysarthria.  - No gaze preference. No apparent hemineglect.  - PERRL, EOMI  - Mild left facial droop, though activates well  - 3-4/4 on left side (SMA exam), 5/5 on the right    LABS:  Recent Labs   Lab 09/24/20  0655 09/24/20  0059 09/23/20  0645    139  --    POTASSIUM 3.4 3.3* 3.9   CHLORIDE 113* 109  --    CO2 23 21  --    ANIONGAP 8 9  --    * 150*  --    BUN 9 12  --    CR 0.68 0.62* 0.86   MARGUERITE 8.0* 8.0*  --        Recent Labs   Lab 09/24/20  0655   WBC 14.7*   RBC 3.99*   HGB 12.3*   HCT 36.0*   MCV 90   MCH 30.8   MCHC 34.2   RDW 16.2*           IMAGING:  Recent Results (from the past 24 hour(s))   MR III Surgical Procedure    Narrative    MRI (Intraoperative) surgical procedure    Provided History:  Anaplastic gliomas/glioblastoma, initial workup;  Glioblastoma (H).  ICD-10: Glioblastoma (H)  Comparison:  none      Technique: Multi-planar multi-sequence pre and postoperative sequences  were obtained without  intravenous contrast prior to and after the  craniotomy performed by the neurosurgeons in the MR interventional  suite.   Contrast: 7mL Gadavist    Findings: Please refer to the previous complete head MRI for full  description of the findings.    Please see same day MR stealth dictation.    Perioperative images demonstrate open cranium with a large resection  cavity in the right frontal lobe measuring approximately 6.0 x 4.5 x  5.2 cm which is contiguous with the anterior horn of the right lateral  ventricle. There is a rounded area of enhancement near the gray-white  junction of the right frontal lobe just posterior to the  posterosuperior resection margin demonstrated on series 100, image 75  and series 3, image 132 measuring 1.0 x 1.1 cm. Also enhancing lesions  in the posterior lateral right frontal lobe measuring 0.6 x 1.0 cm and  0.3 x 0.5 cm on series 100, image 102 and 98, respectively.     Significant amount of vasogenic edema in the white matter in the  frontal lobe extending into the temporal and parietal lobes. Moderate  pneumocephalus anterior to bilateral frontal lobes which produce mild  mass effect on the adjacent parenchyma. No intracranial hemorrhage or  midline shift. Major intracranial vasculature appears patent, although  the resection cavity abuts the A2 and A3 segments of the anterior  cerebral arteries.    No dedicated postoperative imaging with a closed cranial vault  obtained.      Impression    Impression:  In a patient with a prior history of  anaplastic glioma/glioblastoma,  limited intraoperative imaging demonstrates  near total resection of a  large portion of the right frontal lobe tumor surrounding the  resection cavity. Suspect residual tumor along the posterior superior  aspect of the resection margin as well as deeper into the posterior  frontal lobe.    I have personally reviewed the examination and initial interpretation  and I agree with the findings.    ADOLFO SHEIKH MD   MR Brain w Contrast    Narrative    MRI (Intraoperative) surgical procedure    Provided History:  Anaplastic gliomas/glioblastoma, initial workup;  Glioblastoma (H).  ICD-10: Glioblastoma (H)  Comparison:  none      Technique: Multi-planar multi-sequence pre and postoperative sequences  were obtained without  intravenous contrast prior to and after the  craniotomy performed by the neurosurgeons in the MR interventional  suite.   Contrast: 7mL Gadavist    Findings: Please refer to the previous complete head MRI for full  description of the findings.    Please see same day MR stealth dictation.    Perioperative images demonstrate open cranium with a large resection  cavity in the right frontal lobe measuring approximately 6.0 x 4.5 x  5.2 cm which is contiguous with the anterior horn of the right lateral  ventricle. There is a rounded area of enhancement near the gray-white  junction of the right frontal lobe just posterior to the  posterosuperior resection margin demonstrated on series 100, image 75  and series 3, image 132 measuring 1.0 x 1.1 cm. Also enhancing lesions  in the posterior lateral right frontal lobe measuring 0.6 x 1.0 cm and  0.3 x 0.5 cm on series 100, image 102 and 98, respectively.     Significant amount of vasogenic edema in the white matter in the  frontal lobe extending into the temporal and parietal lobes. Moderate  pneumocephalus anterior to bilateral frontal lobes which produce mild  mass effect on the adjacent parenchyma. No intracranial hemorrhage or  midline shift. Major intracranial vasculature appears patent, although  the  resection cavity abuts the A2 and A3 segments of the anterior  cerebral arteries.    No dedicated postoperative imaging with a closed cranial vault  obtained.      Impression    Impression:  In a patient with a prior history of  anaplastic glioma/glioblastoma,  limited intraoperative imaging demonstrates near total resection of a  large portion of the right frontal lobe tumor surrounding the  resection cavity. Suspect residual tumor along the posterior superior  aspect of the resection margin as well as deeper into the posterior  frontal lobe.    I have personally reviewed the examination and initial interpretation  and I agree with the findings.    ADOLFO SHEIKH MD   XR Chest Port 1 View    Narrative    EXAM: XR CHEST PORT 1 VW  9/23/2020 7:14 PM     HISTORY:  possible aspiration of gastric contents       COMPARISON:  None available.    FINDINGS: AP radiograph of the chest. Borderline enlargement of the  cardiomediastinal silhouette. No pneumothorax or pleural effusion. No  focal airspace opacity. The visualized upper abdomen is unremarkable.  No acute osseous abnormality.         Impression    IMPRESSION: No focal airspace disease. Cannot exclude a retrocardiac  opacity. If there is high concern, obtain a CT.    I have personally reviewed the examination and initial interpretation  and I agree with the findings.    ROCIO BLEVINS MD   CT Head w/o Contrast    Narrative    CT HEAD W/O CONTRAST 9/23/2020 9:09 PM    History: S/p right frontal glioblastoma resection   ICD-10:    Comparison: Brain MRIs 9/23/2020    Technique: Using multidetector thin collimation helical acquisition  technique, axial, coronal and sagittal CT images from the skull base  to the vertex were obtained without intravenous contrast.    Findings: Postoperative changes of right frontal craniotomy with large  resection cavity involving the right frontal lobe extends to the  anterior horn of the right lateral ventricle. There  is  hyperattenuating material outlining a cavity with more concentrated  area of this hyperattenuating material in the posterior superior  aspect of the resection cavity measuring 4 x 7 mm in series 2, image  25.  Moderate amount of pneumocephalus which enters the lateral  ventricles. Stable large amount of white matter edema in the posterior  right frontal lobe, parietal lobe, and into the right temporal lobe.  Small amount of gas along the right temporalis descending into the   space.    Ventricles are unchanged in size. No midline shift or significant mass  effect. Basilar cisterns are patent    Air-fluid levels in the ethmoid air cells and sphenoid sinuses,  nonspecific in this intubated patient. Mastoid air cells are clear.      Impression    Impression:  1. Expected postoperative changes of right frontal craniotomy and  large resection cavity of the right frontal lobe. There is marginal  hyperattenuation outlining the resection cavity, likely from  cauterization.   2. Moderate pneumocephalus. No intracranial shift or large hematoma.    I have personally reviewed the examination and initial interpretation  and I agree with the findings.    ADOLFO SHEIKH MD

## 2020-09-24 NOTE — ANESTHESIA POSTPROCEDURE EVALUATION
Anesthesia POST Procedure Evaluation    Patient: Gaetano Holley   MRN:     [id number] Gender:   male   Age:    50 year old :      1970        Preoperative Diagnosis: Glioblastoma (H) [C71.9]   Procedure(s):  INTRAOPERATIVE Magnetic Resonance Imaging CRANIOTOMY, USING OPTICAL TRACKING SYSTEM, STEALTH   Postop Comments: No value filed.     Anesthesia Type: General       Disposition: ICU   Postop Pain Control: Uneventful            Sign Out: Well controlled pain   PONV: Yes            Symptoms: Nausea + Vomiting            Sign Out: PONV/POV resolved with treatment   Neuro/Psych: Uneventful            Sign Out: Acceptable/Baseline neuro status   Airway/Respiratory: Uneventful            Sign Out: Acceptable/Baseline resp. status   CV/Hemodynamics: Uneventful            Sign Out: Acceptable CV status   Other NRE: NONE   DID A NON-ROUTINE EVENT OCCUR? YES    Event details/Postop Comments:  Patient with emesis x3, possible aspiration in PACU. Patient suctioned after each episode. Sats in mid to high 90s on oxyplus face mask throughout PACU stay. IV odansetron, compazine, metoclopramide given. Per patient, no nausea. Likely uncontrolled gastric reflux gastric reflux. IV famotidine and IV protonix also ordered. Cautioned covering NSGY resident about uncontrolled reflux and sedation with antiemetics and ability to protect airway, and concern about possible reflux and aspiration at a remote imaging location during CT (CT requested to be done before transport to ICU) given frequency of reflux events in PACU. Recommended patient to have a more awake mental status before considering CT scan, as CT scan is not emergent, and possible brief ICU stay for monitoring before CT imaging if needed.    Patient otherwise hemodynamically stable, comfortable. From a mental status standpoint, he does respond appropriately to questions, but is somnolent.         Last Anesthesia Record Vitals:  CRNA VITALS  2020 1742 -  9/23/2020 1842      9/23/2020             ART BP:  121/72    Ht Rate:  78          Last PACU Vitals:  Vitals Value Taken Time   /79 9/23/2020  7:36 PM   Temp 37.7  C (99.9  F) 9/23/2020  7:00 PM   Pulse 105 9/23/2020  7:41 PM   Resp 20 9/23/2020  7:00 PM   SpO2 97 % 9/23/2020  7:41 PM   Temp src     NIBP     Pulse     SpO2 97 % 9/23/2020  6:15 PM   Resp     Temp     Ht Rate 78 9/23/2020  6:24 PM   Temp 2     Vitals shown include unvalidated device data.      Electronically Signed By: Marvin June MD, September 23, 2020, 7:42 PM

## 2020-09-24 NOTE — BRIEF OP NOTE
[facility], [place]    Brief Operative Note    Pre-operative diagnosis: Glioblastoma (H) [C71.9]  Post-operative diagnosis Same as pre-operative diagnosis    Procedure: Procedure(s):  INTRAOPERATIVE Magnetic Resonance Imaging CRANIOTOMY, USING OPTICAL TRACKING SYSTEM, STEALTH  Surgeon: Surgeon(s) and Role:     * Raul Haq MD - Primary     * Ayesha Germain MD - Resident - Assisting  Anesthesia: General   Estimated blood loss: 50 ml  Drains: None  Specimens:   ID Type Source Tests Collected by Time Destination   A : Right Brain Tumor Tissue Brain SURGICAL PATHOLOGY EXAM Raul Haq MD 9/23/2020 10:21 AM    B : right brain tumor Tissue Other SURGICAL PATHOLOGY EXAM Raul Haq MD 9/23/2020  3:14 PM      Findings:   None.  Complications: None.  Implants:   Implant Name Type Inv. Item Serial No.  Lot No. LRB No. Used Action   IMP SCR SYN MATRIX LOW PRO 1.5X04MM SELF DRILL [id number] Metallic Hardware/[employer] IMP SCR SYN MATRIX LOW PRO 1.5X04MM SELF DRILL [id number]  SYNTHES-STRATEC NA Right 12 Explanted   IMP BUR HOLE COVER 17MM LOW PROFILE .527 Metallic Hardware/[employer] IMP BUR HOLE COVER 17MM LOW PROFILE .527 N/A SYN[employer]ES-STRATEC  Right 2 Explanted   IMP PLATE SYN BOX LOW PROFILE 04H .511 Metallic Hardware/[employer] IMP PLATE SYN BOX LOW PROFILE 04H .511 N/A [employer]AT[employer]  Right 1 Explanted   IMP PLATE SYN BOX LOW PROFILE 04H .511 Metallic Hardware/[employer] IMP PLATE SYN BOX LOW PROFILE 04H .511  SYNTHES-STRATEC . Right 2 Implanted   IMP BUR HOLE COVER 24MM LOW PROFILE .528 Metallic Hardware/[employer] IMP BUR HOLE COVER 24MM LOW PROFILE .528  SYNTHES-STRATEC . Right 2 Implanted   GRAFT DURAGEN 3X3&quot;  Bone/Tissue/Biologic GRAFT DURAGEN 3X3&quot;  [id number] [employer] [id number] Right 1 Implanted   IMP SCR SYN MATRIX LOW PRO 1.5X04MM SELF DRILL [id number] Metallic  Hardware/[place] IMP SCR SYN MATRIX LOW PRO 1.5X04MM SELF DRILL [id number] NA SYNTHES-STRATEC  Right 15 Implanted       Isela Berger MD  Neurosurgery Resident, PGY-2    Please contact neurosurgery resident on call with questions.    Dial * * [phone], enter [phone] when prompted.

## 2020-09-24 NOTE — PROGRESS NOTES
Social Work Note: Telephone Call  Oncology Clinic    Data/Intervention:  Patient Name:  Gaetano Holley  /Age:  1970 (50 year old)    Call From:  SW spoke with patient's wife over the phone  Reason for Call:  Lodging options    Assessment:  Social work spoke with wife over the phone regarding pt's upcoming surgery.  SW had received referral to assist with lodging options for wife during pt's hospitalizations.  Wife indicated she had already made local hotel arrangements for upcoming procedure.  SW inquired about family's financial situation. Wife indicated needing to take time off from work during procedure and needs and she was unsure at time of call whether it would be paid. SW gave education on [facility] oncology grants, wife indicated wanting to apply.  Applications completed over the phone and submitted for [facility] and [facility] grants.    Plan:  SW contact information provided for any other needs.     Soo Yeon Han, MSW, LICSW  Pager: [phone]  Phone: [phone]

## 2020-09-24 NOTE — PROGRESS NOTES
MD notified of peaked T wave according to continuous ECG. Pt. Increased tachycardia (130's now, was 110's), T wave became peaked at approximately 2311, this was following two doses of hydralazine given to maintain BP within goal. Pt. Also had emesis about the same time. Awaiting MD response.

## 2020-09-24 NOTE — PROGRESS NOTES
"   09/24/20 5897   Quick Adds   Type of Visit Initial PT Evaluation   Living Environment   Lives With spouse   Living Arrangements house   Home Accessibility stairs to enter home;stairs within home   Number of Stairs, Main Entrance 3   Transportation Anticipated family or friend will provide   Living Environment Comment Pt lives in a rambler style home with his SO and child. SO is unable to provide 24/7 support as she works.    Self-Care   Usual Activity Tolerance good   Current Activity Tolerance fair   Regular Exercise Yes   Activity/Exercise Type walking   Equipment Currently Used at Home cane, straight   Activity/Exercise/Self-Care Comment Closer to admission started to use SEC intermittently 2/2 decline in L LE weakness.   Functional Level Prior   Ambulation 0-->independent   Transferring 0-->independent   Toileting 0-->independent   Bathing 0-->independent   Communication 0-->understands/communicates without difficulty   Swallowing 0-->swallows foods/liquids without difficulty   Cognition 0 - no cognition issues reported   Fall history within last six months no   Which of the above functional risks had a recent onset or change? ambulation;transferring;toileting;bathing;dressing   Prior Functional Level Comment Pt previously and mostly IND - very intermittent use of SEC.   General Information   Onset of Illness/Injury or Date of Surgery - Date 09/23/20   Referring Physician Trevor Tipton MD    Patient/Family Goals Statement Return to PLOF   Pertinent History of Current Problem (include personal factors and/or comorbidities that impact the POC) Per chart: \" 50 year old male POD #1 (9/23/20) s/p right frontal tumor resection. Pt had resection in January 2020 and recovered back to ind.\"   Precautions/Limitations fall precautions;other (see comments)  (Craniotomy Precautions)   General Info Comments Activity: ambulate with assist   Cognitive Status Examination   Orientation person;place   Level of " Consciousness alert;lethargic/somnolent   Follows Commands and Answers Questions 100% of the time;able to follow single-step instructions   Personal Safety and Judgment impaired   Memory intact   Cognitive Comment Delayed processing/increased time for word finding   Pain Assessment   Patient Currently in Pain No   Integumentary/Edema   Integumentary/Edema Comments crani incision   Posture    Posture Forward head position;Protracted shoulders   Posture Comments L lateral body lean   Range of Motion (ROM)   ROM Comment WFL   Strength   Strength Comments L UE/LE grossly 3/5 with increased time to complete, R LE/UE grossly 4/5   Bed Mobility   Bed Mobility Comments NA   Transfer Skills   Transfer Comments STSs, mod A x 2; dependent transfers   Gait   Gait Comments Mod A x 2 for stepping at edge of recliner; no stairs   Balance   Balance Comments Impaired sitting and standing balance   Coordination   Coordination other (see comments)   Coordination Comments Impaired   General Therapy Interventions   Planned Therapy Interventions balance training;bed mobility training;gait training;strengthening;transfer training;risk factor education;home program guidelines;progressive activity/exercise;neuromuscular re-education   Clinical Impression   Criteria for Skilled Therapeutic Intervention yes, treatment indicated   PT Diagnosis Impaired functional mobility   Influenced by the following impairments decreased strength, endurance, and balance/coordination; craniotomy precautions   Functional limitations due to impairments increased assistance; falls risk; limited activity tolerance; dependent transfers   Clinical Presentation Stable/Uncomplicated   Clinical Presentation Rationale clinical judgement and chart review   Clinical Decision Making (Complexity) Moderate complexity   Therapy Frequency 6x/week   Predicted Duration of Therapy Intervention (days/wks) days   Anticipated Discharge Disposition Acute Rehabilitation  "Facility;Transitional Care Facility   Risk & Benefits of therapy have been explained Yes   Patient, Family & other staff in agreement with plan of care Yes   [facility]-PAC TM \"6 Clicks\"   2016, Trustees of [facility], under license to [employer].  All rights reserved.   6 Clicks Short Forms Basic Mobility Inpatient Short Form   [facility] AM-PAC  \"6 Clicks\" V.2 Basic Mobility Inpatient Short Form   1. Turning from your back to your side while in a flat bed without using bedrails? 3 - A Little   2. Moving from lying on your back to sitting on the side of a flat bed without using bedrails? 2 - A Lot   3. Moving to and from a bed to a chair (including a wheelchair)? 1 - Total   4. Standing up from a chair using your arms (e.g., wheelchair, or bedside chair)? 2 - A Lot   5. To walk in hospital room? 1 - Total   6. Climbing 3-5 steps with a railing? 1 - Total   Basic Mobility Raw Score (Score out of 24.Lower scores equate to lower levels of function) 10   Total Evaluation Time   Total Evaluation Time (Minutes) 6     "

## 2020-09-24 NOTE — PROGRESS NOTES
"   09/24/20 1100   Signing Clinician's Name / Credentials   Signing clinician's name / credentials cheyanne jones ot/maci Ronquillo Adds   Rehab Discipline OT   Therapeutic Activity   Minutes of Treatment 25 minutes   Treatment Detail Pt supine to EOB requiring tactile and VC's for mobilizing L LE as well as physical assist , bed mobility max assist supine to EOB. Pt sit to stand min assist however max assist pivot to chair. pt requiring physical assist with L LE. pt max assist repositioning in chair. Pt able to complete sit to stand on 4 more occasions with increased focus on using visual sensorimotor input to compensate for lost proprioception in L LE with minimal success. Pt and SO educated in using vision for compensation in thi manner, both demo competence.    Therapeutic Exercise   Minutes of Treatment 10   Treatment Detail Therapist offering P/AAROM to L UE with increased focus on increasing sensorimotor input to increase L UE function. Pt with less than antigavity strength in L shoulder.    ADL Training   Minutes of Treatment 10   Treatment Detail Pt sitting up in chair challenged with G/H task with increased focus on using L UE. Pt requiring max cues to use L UE and min to mod assist for G/H using L UE. Pt and SO re-educated in craniotomy precautions and demo competence, focus on LE dressing. SO also educated in using masonite in couch to prevent pt from \"being swallowed\" by couch as she indicates this may contribute to pt's recent decline in mobility and motivation to get out of the couch.    Additional Documentation   OT Plan OT: L UE function, skateboard on table top, G/H EOB, standing tolerance.    [facility] eTipping-PAC TM \"6 Clicks\"   2016, Trustees of [facility], under license to [employer].  All rights reserved.   6 Clicks Short Forms Daily Activity Inpatient Short Form   [facility] AM-PAC  \"6 Clicks\" Daily Activity Inpatient Short Form   1. Putting on and taking off regular lower body " clothing? 2 - A Lot   2. Bathing (including washing, rinsing, drying)? 2 - A Lot   3. Toileting, which includes using toilet, bedpan or urinal? 2 - A Lot   4. Putting on and taking off regular upper body clothing? 2 - A Lot   5. Taking care of personal grooming such as brushing teeth? 2 - A Lot   6. Eating meals? 2 - A Lot   Daily Activity Raw Score (Score out of 24.Lower scores equate to lower levels of function) 12   Total Session Time   Total Session Time (minutes) 50 minutes

## 2020-09-24 NOTE — OP NOTE
Name:  Gaetano Holley  MRN:  [id number]  YOB: 1970  Date of Surgery:  September 23, 2020    Pre-operative Diagnosis: Recurrent glioblastoma, right  Post-operative Diagnosis: Same  Procedure:  1) Craniotomy for tumor resection, right  2) Microdissection  3) Neuronavigation  4) 5ALA guided surgical resection  5) Intra-operative MRI    Anesthesia: GETA    Surgeon: Raul Lacey MD, PhD  Assistant: Aeysha Germain MD    Description of Procedure: After pre-operative laboratory value and informed consent were verified, the patient was brought into the operating room. The patient underwent general anesthesia and intubation. Antibiotic was administered.    The patient was placed in a supine position, with the head flexed in a neutral position, exposing the right frontal cranium.  The [place]ug[place] reference frame was placed. The patient's anatomy was registered relative to the pre-operative MRI using the [employer] system.    The region overlying the tumor was identified. The area encompassing the previous  surgical incision was prepped and draped in a sterile manner.     Time out was performed. The incision was re-opened with a 10 blade. Hemostasis was achieved using a combination of cautery and Caitlyn clips. The incision was retracted using a cerebellar retractor.  The region overlying the tumor was confirmed using the Stealth probe.    The previous craniotomy flap was removed and extended anteriorly through a second craniotomy to facilitate surgical resection. Dural bleeding was controlled. The epidural space was packed with Floseal.     The dura was opened in a cruciate manner. Corticectomy was performed in the region immediate superficial to the lesion. The tumor specimen was immediately apparent. A biopsy was taken and sent to pathology.     Frozen pathology findings are consistent with high grade glioma.    The microscope was brought in to facilitate microdissection. The plane between the  tumor capsule and the normal cerebrum was identified. This plane was developed. Through a combination of tumor debulking and plane development, the dissection as carried out until the entirety of the visible lesion was removed.    The microscope was then switched to blue light to facilitate 5ALA guided resection. Additional 5ALA florescent tumor was removed. An intra-operative MRI was performed to confirm maximal resection.  Based on this MRI, additional tumor tissue was removed to achieve gross total resection.    Meticulous hemostasis was achieved after the tumor resection. The resection cavity was overlaid with surgicel.    After hemostasis, I turned my attention to the closure.  The two piece craniotomy flap was reconstructed and secured using titanium plate/screw construct. The wound was amply irrigated with bacitracin containing saline. The galea was closed with 2'0 vicryl. The skin was closed with 3'0 Monocryl. Exofin was applied.    I was present and performed the key portions of the procedure.    EBL: 100 cc's    Specimens: resected tumor specimens    Disposition: ICU

## 2020-09-24 NOTE — PROGRESS NOTES
Admitted/transferred from: PACU  Reason for admission/transfer: Craniotomy   Patient status upon admission/transfer: Stable  Code Status: full  Admitting team: NSG  Airway: 4L NC  Lines present: PIV x 4, R rad A line  Interventions: frequent neuro checks  Plan: Monitor pt.  2 RN skin assessment: completed by  Luis Angel MARTINEZ  Result of skin assessment and interventions/actions:Head surgical incision  Height, weight, drug calc weight: done  Patient belongings: glasses and facemask remain with pt.  Family notified of admission: yes  MDRO education (if applicable):

## 2020-09-25 ENCOUNTER — APPOINTMENT (OUTPATIENT)
Dept: OCCUPATIONAL THERAPY | Facility: CLINIC | Age: 50
DRG: 025 | End: 2020-09-25
Attending: NEUROLOGICAL SURGERY
Payer: COMMERCIAL

## 2020-09-25 ENCOUNTER — APPOINTMENT (OUTPATIENT)
Dept: PHYSICAL THERAPY | Facility: CLINIC | Age: 50
DRG: 025 | End: 2020-09-25
Attending: NEUROLOGICAL SURGERY
Payer: COMMERCIAL

## 2020-09-25 LAB
ANION GAP SERPL CALCULATED.3IONS-SCNC: 6 MMOL/L (ref 3–14)
BUN SERPL-MCNC: 11 MG/DL (ref 7–30)
CALCIUM SERPL-MCNC: 8.9 MG/DL (ref 8.5–10.1)
CHLORIDE SERPL-SCNC: 108 MMOL/L (ref 94–109)
CO2 SERPL-SCNC: 25 MMOL/L (ref 20–32)
COPATH REPORT: NORMAL
CREAT SERPL-MCNC: 0.51 MG/DL (ref 0.66–1.25)
ERYTHROCYTE [DISTWIDTH] IN BLOOD BY AUTOMATED COUNT: 16.2 % (ref 10–15)
GFR SERPL CREATININE-BSD FRML MDRD: >90 ML/MIN/{1.73_M2}
GLUCOSE BLDC GLUCOMTR-MCNC: 146 MG/DL (ref 70–99)
GLUCOSE SERPL-MCNC: 151 MG/DL (ref 70–99)
HCT VFR BLD AUTO: 32.5 % (ref 40–53)
HGB BLD-MCNC: 11 G/DL (ref 13.3–17.7)
INTERPRETATION ECG - MUSE: NORMAL
LACTATE BLD-SCNC: 3.1 MMOL/L (ref 0.7–2)
LACTATE BLD-SCNC: 3.3 MMOL/L (ref 0.7–2)
MAGNESIUM SERPL-MCNC: 2.2 MG/DL (ref 1.6–2.3)
MCH RBC QN AUTO: 30.9 PG (ref 26.5–33)
MCHC RBC AUTO-ENTMCNC: 33.8 G/DL (ref 31.5–36.5)
MCV RBC AUTO: 91 FL (ref 78–100)
PHOSPHATE SERPL-MCNC: 2.5 MG/DL (ref 2.5–4.5)
PLATELET # BLD AUTO: 199 10E9/L (ref 150–450)
POTASSIUM SERPL-SCNC: 3.7 MMOL/L (ref 3.4–5.3)
RBC # BLD AUTO: 3.56 10E12/L (ref 4.4–5.9)
SODIUM SERPL-SCNC: 139 MMOL/L (ref 133–144)
VANCOMYCIN SERPL-MCNC: 8.1 MG/L
WBC # BLD AUTO: 16.2 10E9/L (ref 4–11)

## 2020-09-25 PROCEDURE — 40000275 ZZH STATISTIC RCP TIME EA 10 MIN

## 2020-09-25 PROCEDURE — 25000128 H RX IP 250 OP 636: Performed by: STUDENT IN AN ORGANIZED HEALTH CARE EDUCATION/TRAINING PROGRAM

## 2020-09-25 PROCEDURE — 25000131 ZZH RX MED GY IP 250 OP 636 PS 637: Performed by: NURSE PRACTITIONER

## 2020-09-25 PROCEDURE — 80202 ASSAY OF VANCOMYCIN: CPT | Performed by: NEUROLOGICAL SURGERY

## 2020-09-25 PROCEDURE — 40000014 ZZH STATISTIC ARTERIAL MONITORING DAILY

## 2020-09-25 PROCEDURE — 25000132 ZZH RX MED GY IP 250 OP 250 PS 637: Performed by: STUDENT IN AN ORGANIZED HEALTH CARE EDUCATION/TRAINING PROGRAM

## 2020-09-25 PROCEDURE — 25000131 ZZH RX MED GY IP 250 OP 636 PS 637: Performed by: STUDENT IN AN ORGANIZED HEALTH CARE EDUCATION/TRAINING PROGRAM

## 2020-09-25 PROCEDURE — 97116 GAIT TRAINING THERAPY: CPT | Mod: GP

## 2020-09-25 PROCEDURE — 84100 ASSAY OF PHOSPHORUS: CPT | Performed by: STUDENT IN AN ORGANIZED HEALTH CARE EDUCATION/TRAINING PROGRAM

## 2020-09-25 PROCEDURE — 25800030 ZZH RX IP 258 OP 636: Performed by: NEUROLOGICAL SURGERY

## 2020-09-25 PROCEDURE — 97530 THERAPEUTIC ACTIVITIES: CPT | Mod: GO | Performed by: OCCUPATIONAL THERAPIST

## 2020-09-25 PROCEDURE — 97530 THERAPEUTIC ACTIVITIES: CPT | Mod: GP

## 2020-09-25 PROCEDURE — 36415 COLL VENOUS BLD VENIPUNCTURE: CPT | Performed by: STUDENT IN AN ORGANIZED HEALTH CARE EDUCATION/TRAINING PROGRAM

## 2020-09-25 PROCEDURE — 83605 ASSAY OF LACTIC ACID: CPT | Performed by: NEUROLOGICAL SURGERY

## 2020-09-25 PROCEDURE — 99207 ZZC CDG-MDM COMPONENT: MEETS MODERATE - UP CODED: CPT | Performed by: PHYSICIAN ASSISTANT

## 2020-09-25 PROCEDURE — 80048 BASIC METABOLIC PNL TOTAL CA: CPT | Performed by: STUDENT IN AN ORGANIZED HEALTH CARE EDUCATION/TRAINING PROGRAM

## 2020-09-25 PROCEDURE — 25800030 ZZH RX IP 258 OP 636: Performed by: STUDENT IN AN ORGANIZED HEALTH CARE EDUCATION/TRAINING PROGRAM

## 2020-09-25 PROCEDURE — 12000001 ZZH R&B MED SURG/OB UMMC

## 2020-09-25 PROCEDURE — 85027 COMPLETE CBC AUTOMATED: CPT | Performed by: STUDENT IN AN ORGANIZED HEALTH CARE EDUCATION/TRAINING PROGRAM

## 2020-09-25 PROCEDURE — 00000146 ZZHCL STATISTIC GLUCOSE BY METER IP

## 2020-09-25 PROCEDURE — 25000125 ZZHC RX 250: Performed by: STUDENT IN AN ORGANIZED HEALTH CARE EDUCATION/TRAINING PROGRAM

## 2020-09-25 PROCEDURE — 36415 COLL VENOUS BLD VENIPUNCTURE: CPT | Performed by: NEUROLOGICAL SURGERY

## 2020-09-25 PROCEDURE — 83605 ASSAY OF LACTIC ACID: CPT | Performed by: STUDENT IN AN ORGANIZED HEALTH CARE EDUCATION/TRAINING PROGRAM

## 2020-09-25 PROCEDURE — 25000128 H RX IP 250 OP 636: Performed by: NEUROLOGICAL SURGERY

## 2020-09-25 PROCEDURE — 97535 SELF CARE MNGMENT TRAINING: CPT | Mod: GO | Performed by: OCCUPATIONAL THERAPIST

## 2020-09-25 PROCEDURE — 83735 ASSAY OF MAGNESIUM: CPT | Performed by: STUDENT IN AN ORGANIZED HEALTH CARE EDUCATION/TRAINING PROGRAM

## 2020-09-25 PROCEDURE — 99232 SBSQ HOSP IP/OBS MODERATE 35: CPT | Performed by: PHYSICIAN ASSISTANT

## 2020-09-25 RX ORDER — LORATADINE 10 MG/1
10 TABLET, ORALLY DISINTEGRATING ORAL DAILY
Status: DISCONTINUED | OUTPATIENT
Start: 2020-09-26 | End: 2020-09-29 | Stop reason: HOSPADM

## 2020-09-25 RX ADMIN — SODIUM CHLORIDE, SODIUM GLUCONATE, SODIUM ACETATE, POTASSIUM CHLORIDE AND MAGNESIUM CHLORIDE 125 ML/HR: 526; 502; 368; 37; 30 INJECTION, SOLUTION INTRAVENOUS at 22:01

## 2020-09-25 RX ADMIN — HYDRALAZINE HYDROCHLORIDE 10 MG: 20 INJECTION INTRAMUSCULAR; INTRAVENOUS at 02:13

## 2020-09-25 RX ADMIN — LABETALOL 20 MG/4 ML (5 MG/ML) INTRAVENOUS SYRINGE 10 MG: at 04:04

## 2020-09-25 RX ADMIN — ACETAMINOPHEN 975 MG: 325 TABLET, FILM COATED ORAL at 19:25

## 2020-09-25 RX ADMIN — POTASSIUM CHLORIDE 20 MEQ: 1.5 POWDER, FOR SOLUTION ORAL at 06:14

## 2020-09-25 RX ADMIN — LISINOPRIL 10 MG: 10 TABLET ORAL at 08:11

## 2020-09-25 RX ADMIN — POTASSIUM PHOSPHATE, MONOBASIC AND POTASSIUM PHOSPHATE, DIBASIC 10 MMOL: 224; 236 INJECTION, SOLUTION INTRAVENOUS at 06:14

## 2020-09-25 RX ADMIN — LABETALOL 20 MG/4 ML (5 MG/ML) INTRAVENOUS SYRINGE 20 MG: at 04:27

## 2020-09-25 RX ADMIN — POLYETHYLENE GLYCOL 3350 17 G: 17 POWDER, FOR SOLUTION ORAL at 08:10

## 2020-09-25 RX ADMIN — SODIUM CHLORIDE, SODIUM GLUCONATE, SODIUM ACETATE, POTASSIUM CHLORIDE AND MAGNESIUM CHLORIDE 125 ML/HR: 526; 502; 368; 37; 30 INJECTION, SOLUTION INTRAVENOUS at 03:51

## 2020-09-25 RX ADMIN — DOCUSATE SODIUM 50 MG AND SENNOSIDES 8.6 MG 2 TABLET: 8.6; 5 TABLET, FILM COATED ORAL at 19:25

## 2020-09-25 RX ADMIN — LEVOFLOXACIN 500 MG: 500 TABLET, FILM COATED ORAL at 19:26

## 2020-09-25 RX ADMIN — ESCITALOPRAM 10 MG: 10 TABLET, FILM COATED ORAL at 08:11

## 2020-09-25 RX ADMIN — METHYLPHENIDATE HYDROCHLORIDE 5 MG: 5 TABLET ORAL at 08:10

## 2020-09-25 RX ADMIN — OXYCODONE HYDROCHLORIDE 15 MG: 15 TABLET, FILM COATED, EXTENDED RELEASE ORAL at 21:46

## 2020-09-25 RX ADMIN — LEVETIRACETAM 1500 MG: 750 TABLET, FILM COATED ORAL at 08:10

## 2020-09-25 RX ADMIN — VANCOMYCIN HYDROCHLORIDE 1750 MG: 10 INJECTION, POWDER, LYOPHILIZED, FOR SOLUTION INTRAVENOUS at 17:55

## 2020-09-25 RX ADMIN — SODIUM CHLORIDE, SODIUM GLUCONATE, SODIUM ACETATE, POTASSIUM CHLORIDE AND MAGNESIUM CHLORIDE 125 ML/HR: 526; 502; 368; 37; 30 INJECTION, SOLUTION INTRAVENOUS at 13:13

## 2020-09-25 RX ADMIN — VANCOMYCIN HYDROCHLORIDE 1750 MG: 10 INJECTION, POWDER, LYOPHILIZED, FOR SOLUTION INTRAVENOUS at 10:31

## 2020-09-25 RX ADMIN — ACETAMINOPHEN 975 MG: 325 TABLET, FILM COATED ORAL at 10:42

## 2020-09-25 RX ADMIN — SODIUM CHLORIDE, POTASSIUM CHLORIDE, SODIUM LACTATE AND CALCIUM CHLORIDE 1000 ML: 600; 310; 30; 20 INJECTION, SOLUTION INTRAVENOUS at 19:50

## 2020-09-25 RX ADMIN — LEVETIRACETAM 1500 MG: 750 TABLET, FILM COATED ORAL at 19:25

## 2020-09-25 RX ADMIN — METHYLPHENIDATE HYDROCHLORIDE 5 MG: 5 TABLET ORAL at 12:14

## 2020-09-25 RX ADMIN — OMEPRAZOLE 20 MG: 20 CAPSULE, DELAYED RELEASE ORAL at 08:10

## 2020-09-25 RX ADMIN — DEXAMETHASONE 3 MG: 2 TABLET ORAL at 21:46

## 2020-09-25 RX ADMIN — OXYCODONE HYDROCHLORIDE 15 MG: 15 TABLET, FILM COATED, EXTENDED RELEASE ORAL at 10:42

## 2020-09-25 RX ADMIN — ACETAMINOPHEN 975 MG: 325 TABLET, FILM COATED ORAL at 03:51

## 2020-09-25 RX ADMIN — DOCUSATE SODIUM 50 MG AND SENNOSIDES 8.6 MG 2 TABLET: 8.6; 5 TABLET, FILM COATED ORAL at 08:10

## 2020-09-25 RX ADMIN — DEXAMETHASONE 3 MG: 2 TABLET ORAL at 14:00

## 2020-09-25 RX ADMIN — SODIUM CHLORIDE 500 ML: 9 INJECTION, SOLUTION INTRAVENOUS at 15:49

## 2020-09-25 RX ADMIN — DEXAMETHASONE 4 MG: 4 TABLET ORAL at 06:14

## 2020-09-25 ASSESSMENT — VISUAL ACUITY
OU: NORMAL ACUITY

## 2020-09-25 ASSESSMENT — ACTIVITIES OF DAILY LIVING (ADL)
ADLS_ACUITY_SCORE: 16
ADLS_ACUITY_SCORE: 18
ADLS_ACUITY_SCORE: 16
ADLS_ACUITY_SCORE: 18

## 2020-09-25 ASSESSMENT — MIFFLIN-ST. JEOR: SCORE: 1948

## 2020-09-25 NOTE — PHARMACY-VANCOMYCIN DOSING SERVICE
Pharmacy Vancomycin Note  Date of Service 2020  Patient's  1970   50 year old, male    Indication: Postoperative Infection  Goal Trough Level: 15-20 mg/L  Day of Therapy: 3  Current Vancomycin regimen:  1750 mg IV q12h    Current estimated CrCl = Estimated Creatinine Clearance: 221 mL/min (A) (based on SCr of 0.51 mg/dL (L)).    Creatinine for last 3 days  2020:  6:45 AM Creatinine 0.86 mg/dL  2020: 12:59 AM Creatinine 0.62 mg/dL;  6:55 AM Creatinine 0.68 mg/dL  2020:  3:54 AM Creatinine 0.51 mg/dL    Recent Vancomycin Levels (past 3 days)  2020:  9:25 AM Vancomycin Level 8.1 mg/L    Vancomycin IV Administrations (past 72 hours)                   vancomycin (VANCOCIN) 1,750 mg in sodium chloride 0.9 % 500 mL intermittent infusion (mg) 1,750 mg New Bag 20 1031    vancomycin (VANCOCIN) 1,750 mg in sodium chloride 0.9 % 500 mL intermittent infusion (mg) 1,750 mg New Bag 20 2220     1,750 mg New Bag  1023     1,750 mg New Bag 20 2210    vancomycin 1500 mg in 0.9% NaCl 250 ml intermittent infusion 1,500 mg (mg) 1,500 mg Given 20 0920                Nephrotoxins and other renal medications (From now, onward)    Start     Dose/Rate Route Frequency Ordered Stop    20 1030  vancomycin (VANCOCIN) 1,750 mg in sodium chloride 0.9 % 500 mL intermittent infusion      1,750 mg  over 2 Hours Intravenous EVERY 8 HOURS 20 1011      20 0800  lisinopril (ZESTRIL) tablet 10 mg      10 mg Oral DAILY 20 1109               Contrast Orders - past 72 hours (72h ago, onward)    Start     Dose/Rate Route Frequency Ordered Stop    20 1730  gadobutrol (GADAVIST) injection 10 mL      10 mL Intravenous ONCE 20 1703 20 1718    20 1530  gadobutrol (GADAVIST) injection 5 mL      5 mL Intravenous ONCE 20 1507 20 1530          Interpretation of levels and current regimen:  Trough level is  Subtherapeutic    Has serum creatinine  changed > 50% in last 72 hours: No  Urine output:  good urine output  Renal Function: Stable    Plan:  1.  Increase Dose to 1750 mg IV q8h  2.  Pharmacy will check trough levels as appropriate in 1-3 Days.    3. Serum creatinine levels will be ordered daily for the first week of therapy and at least twice weekly for subsequent weeks.      Robyn Gao, PharmD  pager [phone]

## 2020-09-25 NOTE — PROGRESS NOTES
"Social Work: Assessment with Discharge Plan    Patient Name:  Gaetano Holley  :  1970  Age:  50 year old  MRN:  [id number]  Risk/Complexity Score:  Filed Complexity Screen Score: 4  Completed assessment with:  Patient and pt's spouse Shalini at bedside     Presenting Information   Reason for Referral:  Discharge plan  Date of Intake:  2020  Referral Source:  Chart Review  Decision Maker:  Patient   Alternate Decision Maker:  Per NOK, spouse   Health Care Directive:  Will bring in copy  Living Situation:  House  Previous Functional Status:  Independent  Patient and family understanding of hospitalization:  Pt and family have appropriate understanding of hospitalization.   Cultural/Language/Spiritual Considerations:  Pt is a 50 year old  male pt.   Adjustment to Illness:  Pt appears to be adjusting well.     Physical Health  Reason for Admission:    1. Glioblastoma (H)    2. Glioblastoma (H)      Services Needed/Recommended:  ARU    Mental Health/Chemical Dependency  Diagnosis:  Per pt and chart review, dx of depression.   Support/Services in Place:  Medication management. Pt feels depression is well controlled.   Services Needed/Recommended:  NA    Support System  Significant relationship at present time:  Pt's spouse Shalini   Family of origin is available for support:  Yes, Shalini, pt's mother and stepfather ([place], [place]) and pt's in-laws ([place], [place].)   Other support available:  Friends   Gaps in support system:  Pt would not have 24 hr care as Shalini works   Patient is caregiver to:  Child:  Son age 9      Provider Information   Primary Care Physician:  David Quiñonez   [phone]   Clinic:  [place] [address] [facility] [place] [address] [place] [address]      :  BRYSON    Financial   Income Source:  Pt's spouse income  Financial Concerns:  Pt and Shalini denied any financial concerns, stating \"we are doing ok right now.\"  Insurance:    Payor/Plan Subscriber Name Rel Member # Group # "   [facility]* PACHECO VERDIN*  [id number]      [address] [address]   [facility]* SHALINI VERDIN [facility] [id number]      [address]       Discharge Plan   Patient and family discharge goal:  ARU  Provided education on discharge plan:  YES  Patient agreeable to discharge plan:  YES  A list of Medicare Certified Facilities was provided to the patient and/or family to encourage patient choice. Patient's choices for facility are:    - [facility] [phone] f: [phone]  - [facility] [phone] f: [phone]  Will NH provide Skilled rehabilitation or complex medical:  YES  General information regarding anticipated insurance coverage and possible out of pocket cost was discussed. Patient and patient's family are aware patient may incur the cost of transportation to the facility, pending insurance payment: YES  Barriers to discharge:  Medical stability, accepting facility     Discharge Recommendations   Anticipated Disposition:  Facility:  ARU TBD  Transportation Needs:  Family:  Shalini     Additional comments   JENNIFER met with pt and Shalini at bedside to introduce self and role on treatment team. JENNIFER discussed with pt and Shalini current ARU recommendations. Pt and Shalini reported therapy provided them with good education on ARU and are agreeable to an ARU stay. JENNIFER provided medicare.gov list of rated facilities to pt and Shalini. Pt reports he prefers to be closer to home in the [place], [place] area.   Shalini stated she can transport at time of discharge.     JENNIFER called [facility] ARU and spoke with Jomar in admissions. Jomar stated they do not have weekend admissions, but JENNIFER can send referral. Referral sent via MIOX.   JENNIFER called [facility] and spoke with Ashley in admissions. Ashley stated she is unsure if she will be able to look at referral today. JENNIFER sent referral via Epic.     Isela Knight, MSW, LGSW  Adult Acute Care Float JENNIFER  4A Neuro/4E ICU ph:  [phone]  [facility]: [phone]

## 2020-09-25 NOTE — PROGRESS NOTES
Rapid Response Team Note    Assessment   In assessment a rapid response was called on Gaetano Holley due to SIRS/Sepsis trigger.     This presentation is likely due to steroid use, POD #2 s/p R frontal tumor resection and worsened by the comorbidities listed above. The patient is NOT critically ill at this time.    Sepsis Evaluation   NO EVIDENCE OF SEPSIS at this time.  Vital sign, physical exam, and lab findings are likely due to steroid use.    Plan    - Recommend lactate recheck in 6 hours   - Primary team will have further discussion regarding necessity of infectious work-up   - Will hold on IVF for now given hemodynamic stability, O2 needs, has received adequate resuscitation with MIVF     Disposition: The patient will remain on the current unit. We will continue to monitor this patient closely.    The Neurosurgery primary team was able to be reached and they are in agreement with the above plan. Neurosurgery resident present at bedside.     Reassessment   Reassessment and plan follow-up will be performed by the primary team. The current agreed upon plan for reassessment/follow-up includes vitals check and the following labs: lactic acid and will be completed within 6 hours.    Time Spent on this Encounter   Total Critical Care time spent by me, excluding procedures, was 0 minutes.    Thania Suazo PA-C  [facility] RRT AMCOM Job Code Contact #[phone]    Hospital Course   Admission Diagnosis: Glioblastoma (H) [C71.9]     Brief Summary of events leading to rapid response:   A rapid response was called for Gaetano Holley due to lactic acidosis identified by abnormal vitals triggering the SIRS/Sepsis screening alert. Sepsis BPA triggered for leukocytosis and tachycardia. Patient is POD #2 s/p glioblastoma resection. He is receiving dexamethasone. He is on perioperative prophylaxis with IV vancomycin.     The patients is not known to have an infection.    Significant Comorbidities:    Hypertension   Glioblastoma    Medications   Scheduled     acetaminophen  975 mg Oral Q8H     dexamethasone  3 mg Oral or Feeding Tube Q8H     escitalopram  10 mg Oral Daily     fluticasone  50 spray Both Nostrils Daily     levETIRAcetam  1,500 mg Oral BID     levofloxacin  500 mg Oral Daily     lisinopril  10 mg Oral Daily     [START ON 2020] loratadine  10 mg Oral Daily     methylphenidate  5 mg Oral BID     omeprazole  20 mg Oral QAM AC     oxyCODONE  15 mg Oral Q12H     polyethylene glycol  17 g Oral Daily     senna-docusate  1 tablet Oral BID    Or     senna-docusate  2 tablet Oral BID     sodium chloride (PF)  3 mL Intracatheter Q8H     vancomycin (VANCOCIN) IV  1,750 mg Intravenous Q8H      PRN   [START ON 2020] acetaminophen, lidocaine 4%, lidocaine (buffered or not buffered), magnesium sulfate, magnesium sulfate, metoclopramide **OR** metoclopramide, naloxone, naloxone, ondansetron **OR** ondansetron, oxyCODONE, potassium chloride, potassium chloride with lidocaine, potassium chloride, potassium chloride, potassium chloride, potassium phosphate (KPHOS) in D5W IV, potassium phosphate (KPHOS) in D5W IV, potassium phosphate (KPHOS) in D5W IV, potassium phosphate (KPHOS) in D5W IV, potassium phosphate (KPHOS) in D5W IV, prochlorperazine **OR** prochlorperazine **OR** prochlorperazine, sodium chloride (PF)   Allergies   Allergies   Allergen Reactions     Food Other (See Comments)     Pineapple and pineapple juice causes severe vomiting     Morphine      Sedated but the pain was not relieved       Penicillins Other (See Comments)     Childhood reaction, possible anaphylaxis     Pineapple Nausea and Vomiting        Physical Exam   Temp: 97.8  F (36.6  C) Temp  Min: 97.6  F (36.4  C)  Max: 98.8  F (37.1  C)  Resp: 16 Resp  Min: 12  Max: 25  SpO2: 100 % SpO2  Min: 94 %  Max: 100 %  Pulse: 92 Pulse  Min: 81  Max: 115    No data recorded  BP: 102/81 Systolic (24hrs), Av , Min:102 , Max:116   Diastolic  (24hrs), Av, Min:81, Max:85     I/Os: I/O last 3 completed shifts:  In: 3865 [P.O.:240; I.V.:500]  Out: 4425 [Urine:4425]     Exam:   General: in no acute distress  Mental Status: AAOx4.  ENT: Mucous membranes moist.   Respiratory: Normal respiration pattern. Breathing non-labored. CTAB. Good air entry.   Cardiovascular:  RRR, normal S1/S2. No rubs, murmurs, or gallops. No peripheral edema.   GI: Soft, non-tender, non-distended. Bowel sounds present.   Skin: Warm and dry. Good color. Scalp incision c/d/i. No jaundice. No visible rashes, lesions, or bruising of concern.       Significant Results and Procedures   Lactic Acid:   Recent Labs   Lab Test 20  1440   LACTS 3.3*     CBC:   Recent Labs   Lab Test 20  0354 20  0655 20  0645   WBC 16.2* 14.7* 13.8*   HGB 11.0* 12.3* 13.3   HCT 32.5* 36.0* 38.9*    214 232

## 2020-09-25 NOTE — PROGRESS NOTES
Arrived from:  4A  Belongings/meds:  2 bags w/ clothes, cellphone  2 RN Skin Assessment Completed by: Rodrigo SINGER RN, Mima SINGER RN  Non-intact findings documented (yes/no/NA): L side 3/5, R side 4/5, Incision on R side of head WDL ANDREW, pin hole sites, R eye swollen shut

## 2020-09-25 NOTE — PROGRESS NOTES
[facility], [place]   09/25/2020  Neurosurgery Progress Note:    Assessment:  Gaetano Holley is a 50 year old male POD #2 (9/23/20) s/p right frontal tumor resection. Post operatively was dealing with nausea, now under control.    Plan:  - Serial neuro exams  - Pain control  - PTA keppra  - Dex taper  - Vanc and Levaquin for perioperative prophylaxis  - HOB > 30 degrees  - Advance diet as tolerated  - Bowel regimen  - PRN antiemetics  - IVF until taking adequate PO (plasmalyte)  - PT/OT  - SCDs for DVT proph    -----------------------------------  Isela Berger MD  Neurosurgery Resident, PGY-2    Please contact neurosurgery resident on call with questions.    Dial * * [phone], enter [phone] when prompted.   -----------------------------------    Interval History: LULU    Objective:   Temp:  [98  F (36.7  C)-98.8  F (37.1  C)] 98.6  F (37  C)  Pulse:  [] 83  Resp:  [12-25] 14  MAP:  [70 mmHg-117 mmHg] 80 mmHg  Arterial Line BP: ()/() 106/67  SpO2:  [93 %-100 %] 94 %  I/O last 3 completed shifts:  In: 3719.17 [P.O.:240; I.V.:750]  Out: 4925 [Urine:4925]    Gen: Appears comfortable, NAD  Wound: clean, dry, intact  Neurologic:  - Alert & Oriented to person, place, year, and situation  - Follows commands  - No aphasia or dysarthria.  - No gaze preference. No apparent hemineglect.  - PERRL, EOMI  - Mild left facial droop, though activates well  - 3-4/4 on left side (SMA exam), 5/5 on the right    LABS:  Recent Labs   Lab 09/25/20  0354 09/24/20  0655 09/24/20  0059    144 139   POTASSIUM 3.7 3.4 3.3*   CHLORIDE 108 113* 109   CO2 25 23 21   ANIONGAP 6 8 9   * 135* 150*   BUN 11 9 12   CR 0.51* 0.68 0.62*   MARGUERITE 8.9 8.0* 8.0*       Recent Labs   Lab 09/25/20  0354   WBC 16.2*   RBC 3.56*   HGB 11.0*   HCT 32.5*   MCV 91   MCH 30.9   MCHC 33.8   RDW 16.2*          IMAGING:  Recent Results (from the past 24 hour(s))   MR Brain w/o & w Contrast     Narrative     MR BRAIN W/O & W CONTRAST 9/24/2020 5:26 PM    Provided History: Brain tumor protocol. S/p right frontal glioblastoma  resection..    Comparison: Brain MRI 9/23/2020, 8/4/2020, outside brain MRIs  1/8/2020, and 1/5/2020..    Technique: Multiplanar T1-weighted, axial FLAIR, and susceptibility  images were obtained without intravenous contrast. Following  intravenous gadolinium-based contrast administration, axial  T2-weighted, diffusion, and T1-weighted images (in multiple planes)  were obtained.    Contrast: 10mL Gadavist     Findings:  Postoperative changes of right frontal craniotomy with underlying mass  resection. Hemosiderin staining along the resection cavity with  extension to the right lateral ventricle as well as layering blood  within the occipital horns of the lateral ventricles. No  hydrocephalus. T2 hyperintense signal along the resection cavity  extending into the right frontal and parietal white matter as well as  across the Genu of the corpus callosum and along the frontal horn of  the left lateral ventricle. There is mass effect upon the frontal horn  of the right lateral ventricle.    Focus of restricted diffusion within the genu of the corpus callosum  which demonstrates T2 signal hyperintensity but does not demonstrate  enhancement following intravenous contrast demonstration.    3 nodular foci of enhancement posterior to the resection cavity the  first of which measures 5 x 3 x 5 mm (series 100, image 101) adjacent  to this enhancing lesion there is an additional focus measuring 10 x 6  x 6 mm (series 100, image 100). Additional focus of enhancement  superior to the previously described lesion measuring 5 x 3 x 5 mm  (series 100, image 110). Additional foci of enhancement along the dura  of the right frontal lobe. These findings may be representative of  postoperative change or residual disease. The ventricles are  proportionate to the cerebral sulci. Normal major  vascular  intracranial flow-voids.    No abnormality of the skull marrow signal. Mucosal thickening in the  sphenoid sinuses. The remainder the paranasal sinuses are clear. Trace  fluid within the right mastoid air cells. The left mastoid air cells  are clear. The orbits are unremarkable.      Impression    Impression:  This patient with a history of glioblastoma status post resection and  chemoradiotherapy:    1. Postoperative changes of right frontal craniotomy with underlying  mass resection. Hemosiderin staining along the resection cavity with  intraventricular extension into the lateral ventricles. No  hydrocephalus. Linear T1 hyperintense signal surrounding the resection  cavity likely represents mix of postsurgical linear enhancement and  blood products.  2. Nodular foci of enhancement posterior to the resection cavity are  not significantly changed compared to 8/4/2020. Attention on follow-up  imaging is recommended.  3. Dural thickening and enhancement adjacent to the resection cavity,  likely postoperative change.  4. Increased T2 signal hyperintensity in the right frontal parietal  region extending across the genuine of the corpus callosum partially  due to postoperative change. Attention is recommended on follow-up  imaging.    I have personally reviewed the examination and initial interpretation  and I agree with the findings.    CHAGO VENTURA MD

## 2020-09-25 NOTE — PROGRESS NOTES
Sepsis Evaluation Progress Note    I was called to see Gaetano Holley due to abnormal vital signs triggering the Sepsis SIRS screening alert. He is not known to have an infection.     Physical Exam   Vital Signs:  Temp: 97.8  F (36.6  C) Temp src: Oral BP: 102/81 Pulse: 92   Resp: 16 SpO2: 100 % O2 Device: None (Room air) Oxygen Delivery: 15 LPM  @[facility]([id number],1,,1)@  General: in no acute distress  Mental Status: baseline mental status.     Remainder of physical exam is significant for:    Neurological exam:    5/5 RUE/RLE;   - Alert & Oriented to person, place, year, and situation  - Follows commands  - No aphasia or dysarthria.  - No gaze preference. No apparent hemineglect.  - PERRL, EOMI  LUE: 4+/5 deltoid, 4/5 triceps, 4/5 Biceps  LLE: 5/5 hip flexion, 4+/5 knee flexion/extension, 4/5 plantar/dorsiflexion      Data   Lactate for Sepsis Protocol   Date Value Ref Range Status   09/25/2020 3.3 (H) 0.7 - 2.0 mmol/L Final     Comment:     Significant value called to and read back by   JOHAN BLNAK 1426 9/25/2020 6A BY QD         Assessment & Plan   A rapid response was called to lactic acidosis identified by abnormal vitals triggering the SIRS/Sepsis screening alert. Sepsis BPA triggered for leukocytosis and tachycardia. He is POD #2 s/p RF resection for recurrent GBM. On dexamethasone and perioperative prophylaxis with IV vancomycin and levofloxacin. He was given a 500 ml bolus of NS, repeat LA ordered for 7pm.    Sepsis Time-Zero (time Sepsis diagnosis confirmed): 1440  09/25/20    Anti-infectives (From now, onward)    Start     Dose/Rate Route Frequency Ordered Stop    09/25/20 1030  vancomycin (VANCOCIN) 1,750 mg in sodium chloride 0.9 % 500 mL intermittent infusion      1,750 mg  over 2 Hours Intravenous EVERY 8 HOURS 09/25/20 1011      09/23/20 2130  levofloxacin (LEVAQUIN) tablet 500 mg      500 mg Oral DAILY 09/23/20 1911          Current antibiotic coverage is appropriate for source of  infection.     Disposition: The patient will remain on the current unit. We will continue to monitor this patient closely..  Polina Norman MD    Sepsis Criteria   Sepsis: 2+ SIRS criteria due to infection  Severe Sepsis: Sepsis AND 1+ new sign of acute organ dysfunction (Note: lactate >2 or acute encephalopathy each qualify as organ dysfunction)  Septic Shock: Sepsis AND hypotension despite volume resuscitation with 30 ml/kg crystalloid or lactate >=4  Note: HYPOTENSION is defined as 2 BP readings measured 3 hrs apart that have a SBP <90, MAP <65, or decrease >40 mmHg, occurring 6 hrs before or after t-zero

## 2020-09-26 ENCOUNTER — APPOINTMENT (OUTPATIENT)
Dept: OCCUPATIONAL THERAPY | Facility: CLINIC | Age: 50
DRG: 025 | End: 2020-09-26
Attending: NEUROLOGICAL SURGERY
Payer: COMMERCIAL

## 2020-09-26 ENCOUNTER — APPOINTMENT (OUTPATIENT)
Dept: PHYSICAL THERAPY | Facility: CLINIC | Age: 50
DRG: 025 | End: 2020-09-26
Attending: NEUROLOGICAL SURGERY
Payer: COMMERCIAL

## 2020-09-26 LAB
ANION GAP SERPL CALCULATED.3IONS-SCNC: 6 MMOL/L (ref 3–14)
BUN SERPL-MCNC: 13 MG/DL (ref 7–30)
CALCIUM SERPL-MCNC: 8.4 MG/DL (ref 8.5–10.1)
CHLORIDE SERPL-SCNC: 114 MMOL/L (ref 94–109)
CO2 SERPL-SCNC: 25 MMOL/L (ref 20–32)
CREAT SERPL-MCNC: 0.66 MG/DL (ref 0.66–1.25)
ERYTHROCYTE [DISTWIDTH] IN BLOOD BY AUTOMATED COUNT: 15.8 % (ref 10–15)
GFR SERPL CREATININE-BSD FRML MDRD: >90 ML/MIN/{1.73_M2}
GLUCOSE SERPL-MCNC: 152 MG/DL (ref 70–99)
HCT VFR BLD AUTO: 28.5 % (ref 40–53)
HGB BLD-MCNC: 9.2 G/DL (ref 13.3–17.7)
LACTATE BLD-SCNC: 1.4 MMOL/L (ref 0.7–2)
LACTATE BLD-SCNC: 2.4 MMOL/L (ref 0.7–2)
MAGNESIUM SERPL-MCNC: 1.9 MG/DL (ref 1.6–2.3)
MCH RBC QN AUTO: 30.2 PG (ref 26.5–33)
MCHC RBC AUTO-ENTMCNC: 32.3 G/DL (ref 31.5–36.5)
MCV RBC AUTO: 93 FL (ref 78–100)
PHOSPHATE SERPL-MCNC: 3 MG/DL (ref 2.5–4.5)
PLATELET # BLD AUTO: 174 10E9/L (ref 150–450)
POTASSIUM SERPL-SCNC: 4 MMOL/L (ref 3.4–5.3)
RBC # BLD AUTO: 3.05 10E12/L (ref 4.4–5.9)
SODIUM SERPL-SCNC: 144 MMOL/L (ref 133–144)
VANCOMYCIN SERPL-MCNC: 16.3 MG/L
WBC # BLD AUTO: 12.4 10E9/L (ref 4–11)

## 2020-09-26 PROCEDURE — 83605 ASSAY OF LACTIC ACID: CPT | Performed by: STUDENT IN AN ORGANIZED HEALTH CARE EDUCATION/TRAINING PROGRAM

## 2020-09-26 PROCEDURE — 25000131 ZZH RX MED GY IP 250 OP 636 PS 637: Performed by: NURSE PRACTITIONER

## 2020-09-26 PROCEDURE — 80202 ASSAY OF VANCOMYCIN: CPT | Performed by: NEUROLOGICAL SURGERY

## 2020-09-26 PROCEDURE — 25000132 ZZH RX MED GY IP 250 OP 250 PS 637: Performed by: STUDENT IN AN ORGANIZED HEALTH CARE EDUCATION/TRAINING PROGRAM

## 2020-09-26 PROCEDURE — 25000128 H RX IP 250 OP 636: Performed by: STUDENT IN AN ORGANIZED HEALTH CARE EDUCATION/TRAINING PROGRAM

## 2020-09-26 PROCEDURE — 97116 GAIT TRAINING THERAPY: CPT | Mod: GP

## 2020-09-26 PROCEDURE — 40000802 ZZH SITE CHECK

## 2020-09-26 PROCEDURE — 84100 ASSAY OF PHOSPHORUS: CPT | Performed by: NEUROLOGICAL SURGERY

## 2020-09-26 PROCEDURE — 97530 THERAPEUTIC ACTIVITIES: CPT | Mod: GO | Performed by: OCCUPATIONAL THERAPIST

## 2020-09-26 PROCEDURE — 80048 BASIC METABOLIC PNL TOTAL CA: CPT | Performed by: NEUROLOGICAL SURGERY

## 2020-09-26 PROCEDURE — 97750 PHYSICAL PERFORMANCE TEST: CPT | Mod: GP

## 2020-09-26 PROCEDURE — 40000141 ZZH STATISTIC PERIPHERAL IV START W/O US GUIDANCE

## 2020-09-26 PROCEDURE — 36415 COLL VENOUS BLD VENIPUNCTURE: CPT | Performed by: STUDENT IN AN ORGANIZED HEALTH CARE EDUCATION/TRAINING PROGRAM

## 2020-09-26 PROCEDURE — 83735 ASSAY OF MAGNESIUM: CPT | Performed by: NEUROLOGICAL SURGERY

## 2020-09-26 PROCEDURE — 25800030 ZZH RX IP 258 OP 636: Performed by: NEUROLOGICAL SURGERY

## 2020-09-26 PROCEDURE — 25000125 ZZHC RX 250: Performed by: STUDENT IN AN ORGANIZED HEALTH CARE EDUCATION/TRAINING PROGRAM

## 2020-09-26 PROCEDURE — 12000001 ZZH R&B MED SURG/OB UMMC

## 2020-09-26 PROCEDURE — 25800030 ZZH RX IP 258 OP 636: Performed by: STUDENT IN AN ORGANIZED HEALTH CARE EDUCATION/TRAINING PROGRAM

## 2020-09-26 PROCEDURE — 36415 COLL VENOUS BLD VENIPUNCTURE: CPT | Performed by: NEUROLOGICAL SURGERY

## 2020-09-26 PROCEDURE — 25000128 H RX IP 250 OP 636: Performed by: NEUROLOGICAL SURGERY

## 2020-09-26 PROCEDURE — 85027 COMPLETE CBC AUTOMATED: CPT | Performed by: NEUROLOGICAL SURGERY

## 2020-09-26 RX ORDER — HEPARIN SODIUM 5000 [USP'U]/.5ML
5000 INJECTION, SOLUTION INTRAVENOUS; SUBCUTANEOUS EVERY 8 HOURS
Status: DISCONTINUED | OUTPATIENT
Start: 2020-09-26 | End: 2020-09-29 | Stop reason: HOSPADM

## 2020-09-26 RX ADMIN — DEXAMETHASONE 3 MG: 2 TABLET ORAL at 22:30

## 2020-09-26 RX ADMIN — OXYCODONE HYDROCHLORIDE 15 MG: 15 TABLET, FILM COATED, EXTENDED RELEASE ORAL at 11:09

## 2020-09-26 RX ADMIN — ACETAMINOPHEN 975 MG: 325 TABLET, FILM COATED ORAL at 03:40

## 2020-09-26 RX ADMIN — ACETAMINOPHEN 650 MG: 325 TABLET, FILM COATED ORAL at 19:42

## 2020-09-26 RX ADMIN — DOCUSATE SODIUM 50 MG AND SENNOSIDES 8.6 MG 2 TABLET: 8.6; 5 TABLET, FILM COATED ORAL at 19:43

## 2020-09-26 RX ADMIN — LISINOPRIL 10 MG: 10 TABLET ORAL at 07:57

## 2020-09-26 RX ADMIN — METHYLPHENIDATE HYDROCHLORIDE 5 MG: 5 TABLET ORAL at 11:09

## 2020-09-26 RX ADMIN — DEXAMETHASONE 3 MG: 2 TABLET ORAL at 14:44

## 2020-09-26 RX ADMIN — LEVETIRACETAM 1500 MG: 750 TABLET, FILM COATED ORAL at 19:43

## 2020-09-26 RX ADMIN — VANCOMYCIN HYDROCHLORIDE 1750 MG: 10 INJECTION, POWDER, LYOPHILIZED, FOR SOLUTION INTRAVENOUS at 01:55

## 2020-09-26 RX ADMIN — SODIUM CHLORIDE, SODIUM GLUCONATE, SODIUM ACETATE, POTASSIUM CHLORIDE AND MAGNESIUM CHLORIDE 145 ML/HR: 526; 502; 368; 37; 30 INJECTION, SOLUTION INTRAVENOUS at 18:08

## 2020-09-26 RX ADMIN — VANCOMYCIN HYDROCHLORIDE 1750 MG: 10 INJECTION, POWDER, LYOPHILIZED, FOR SOLUTION INTRAVENOUS at 11:09

## 2020-09-26 RX ADMIN — SODIUM CHLORIDE 500 ML: 9 INJECTION, SOLUTION INTRAVENOUS at 01:54

## 2020-09-26 RX ADMIN — METHYLPHENIDATE HYDROCHLORIDE 5 MG: 5 TABLET ORAL at 07:57

## 2020-09-26 RX ADMIN — OXYCODONE HYDROCHLORIDE 15 MG: 15 TABLET, FILM COATED, EXTENDED RELEASE ORAL at 22:31

## 2020-09-26 RX ADMIN — ESCITALOPRAM 10 MG: 10 TABLET, FILM COATED ORAL at 07:57

## 2020-09-26 RX ADMIN — OMEPRAZOLE 20 MG: 20 CAPSULE, DELAYED RELEASE ORAL at 07:57

## 2020-09-26 RX ADMIN — VANCOMYCIN HYDROCHLORIDE 1750 MG: 10 INJECTION, POWDER, LYOPHILIZED, FOR SOLUTION INTRAVENOUS at 18:00

## 2020-09-26 RX ADMIN — POTASSIUM CHLORIDE 40 MEQ: 1500 TABLET, EXTENDED RELEASE ORAL at 18:08

## 2020-09-26 RX ADMIN — POLYETHYLENE GLYCOL 3350 17 G: 17 POWDER, FOR SOLUTION ORAL at 07:57

## 2020-09-26 RX ADMIN — LEVOFLOXACIN 500 MG: 500 TABLET, FILM COATED ORAL at 19:42

## 2020-09-26 RX ADMIN — HEPARIN SODIUM 5000 UNITS: 5000 INJECTION, SOLUTION INTRAVENOUS; SUBCUTANEOUS at 19:44

## 2020-09-26 RX ADMIN — DOCUSATE SODIUM 50 MG AND SENNOSIDES 8.6 MG 2 TABLET: 8.6; 5 TABLET, FILM COATED ORAL at 07:57

## 2020-09-26 RX ADMIN — LORATADINE 10 MG: 10 TABLET, ORALLY DISINTEGRATING ORAL at 07:57

## 2020-09-26 RX ADMIN — DEXAMETHASONE 3 MG: 2 TABLET ORAL at 06:08

## 2020-09-26 RX ADMIN — LEVETIRACETAM 1500 MG: 750 TABLET, FILM COATED ORAL at 07:57

## 2020-09-26 RX ADMIN — MAGNESIUM SULFATE 2 G: 2 INJECTION INTRAVENOUS at 11:15

## 2020-09-26 ASSESSMENT — ACTIVITIES OF DAILY LIVING (ADL)
ADLS_ACUITY_SCORE: 16

## 2020-09-26 ASSESSMENT — VISUAL ACUITY
OU: NORMAL ACUITY
OU: NORMAL ACUITY

## 2020-09-26 ASSESSMENT — PAIN DESCRIPTION - DESCRIPTORS: DESCRIPTORS: ACHING;SHARP

## 2020-09-26 NOTE — PROVIDER NOTIFICATION
Patient's lactic acid re-draw 3.1, MD Berger notified, 1000mL LR bolus ordered, VSS, neuros unchanged, re-draw lactic acid at 0000, will continue to monitor.

## 2020-09-26 NOTE — PHARMACY-VANCOMYCIN DOSING SERVICE
Pharmacy Vancomycin Note  Date of Service 2020  Patient's  1970   50 year old, male    Indication: Postoperative Infection  Goal Trough Level: 15-20 mg/L  Day of Therapy: 4  Current Vancomycin regimen:  1750 mg IV q8h    Current estimated CrCl = Estimated Creatinine Clearance: 170.8 mL/min (based on SCr of 0.66 mg/dL).    Creatinine for last 3 days  2020: 12:59 AM Creatinine 0.62 mg/dL;  6:55 AM Creatinine 0.68 mg/dL  2020:  3:54 AM Creatinine 0.51 mg/dL  2020:  5:37 AM Creatinine 0.66 mg/dL    Recent Vancomycin Levels (past 3 days)  2020:  9:25 AM Vancomycin Level 8.1 mg/L  2020:  9:32 AM Vancomycin Level 16.3 mg/L (7.5 hr tr)    Vancomycin IV Administrations (past 72 hours)                   vancomycin (VANCOCIN) 1,750 mg in sodium chloride 0.9 % 500 mL intermittent infusion (mg) 1,750 mg New Bag 20 1109     1,750 mg New Bag  0155     1,750 mg New Bag 20 1755     1,750 mg New Bag  1031    vancomycin (VANCOCIN) 1,750 mg in sodium chloride 0.9 % 500 mL intermittent infusion (mg) 1,750 mg New Bag 20 2220     1,750 mg New Bag  1023     1,750 mg New Bag 20 2210                Nephrotoxins and other renal medications (From now, onward)    Start     Dose/Rate Route Frequency Ordered Stop    20 1030  vancomycin (VANCOCIN) 1,750 mg in sodium chloride 0.9 % 500 mL intermittent infusion      1,750 mg  over 2 Hours Intravenous EVERY 8 HOURS 20 1011      20 0800  lisinopril (ZESTRIL) tablet 10 mg      10 mg Oral DAILY 20 1109               Contrast Orders - past 72 hours (72h ago, onward)    Start     Dose/Rate Route Frequency Ordered Stop    20 1730  gadobutrol (GADAVIST) injection 10 mL      10 mL Intravenous ONCE 20 1703 20 1718    20 1530  gadobutrol (GADAVIST) injection 5 mL      5 mL Intravenous ONCE 20 1507 20 1530          Interpretation of levels and current regimen:  Trough level is   Therapeutic    Has serum creatinine changed > 50% in last 72 hours: No    Urine output:  good urine output    Renal Function: Stable    Plan:  1.  Continue Current Dose  2.  Pharmacy will check trough levels as appropriate in 3-5 Days.    3. Serum creatinine levels will be ordered daily for the first week of therapy and at least twice weekly for subsequent weeks.      Nilesh Quintana RPH        .

## 2020-09-26 NOTE — PROVIDER NOTIFICATION
09/25/20 1500   Call Information   Date of Call 09/25/20   Time of Call 1449   Name of person requesting the team Mayte Mora   Title of person requesting team RN   RRT Arrival time 1450   Time RRT ended 1509   Reason for call   Type of RRT Adult   Primary reason for call Sepsis suspected   Sepsis Suspected Elevated Lactate level   Was patient transferred from the ED, ICU, or PACU within last 24 hours prior to RRT call? Yes   SBAR   Situation POD #2: R glioblastoma resection, steroid therapy with Lactic Acid 3.3, WBC 16.3.   Background HTN, POD #2 Right glioblastoma resection.   Notable History/Conditions Hypertension;Recent surgery   Assessment Pt. A+O x 4.  Afebrile with vss.  Denies C.P. or SOB.  02 sats upper 90's in RA.   Interventions IV fluids;Labs;Meds;Portable monitor   Adjustments to Recommend See PA note.   Patient Outcome   Patient Outcome Stabilized on unit   RRT Team   Attending/Primary/Covering Physician Thania Suazo PA   Date Attending Physician notified 09/25/20   Time Attending Physician notified 1449   Physician(s) Neurosurgery Team at bedside.   Lead RN Kelli Gudino   RN Mayte Mora   RT none   Post RRT Intervention Assessment   Post RRT Assessment Stable/Improved   Date Follow Up Done 09/25/20   Time Follow Up Done 1800   Comments Repeat Lactic Acid at 1900, 3.1.  Pt. remains afebrile with vss.

## 2020-09-26 NOTE — PROGRESS NOTES
[facility], [place]   09/26/2020  Neurosurgery Progress Note:    Assessment:  Gaetano Holley is a 50 year old male POD #3 (9/23/20) s/p right frontal tumor resection. Post operatively was dealing with nausea, now under control.    Plan:  - Serial neuro exams  - Pain control  - PTA keppra  - Dex 3q8  - Vanc and Levaquin for perioperative prophylaxis  - HOB > 30 degrees  - Advance diet as tolerated  - Bowel regimen  - PRN antiemetics  - IVF until taking adequate PO (plasmalyte)  - Calorie counts  - PT/OT  - SCDs for DVT proph    -----------------------------------  Isela Berger MD  Neurosurgery Resident, PGY-2    Please contact neurosurgery resident on call with questions.    Dial * * [phone], enter [phone] when prompted.   -----------------------------------    Interval History: Triggered sepsis protocol with high lactate. Fluid resuscitation completed. No change in hemodynamics, or neuro status. Lactate normalized.    Objective:   Temp:  [97.6  F (36.4  C)-98.1  F (36.7  C)] 97.6  F (36.4  C)  Pulse:  [] 91  Resp:  [16-24] 16  BP: (102-154)/() 154/104  MAP:  [97 mmHg] 97 mmHg  Arterial Line BP: (130)/(81) 130/81  SpO2:  [96 %-100 %] 97 %  I/O last 3 completed shifts:  In: 750   Out: 2000 [Urine:2000]    Gen: Appears comfortable, NAD  Wound: clean, dry, intact  Neurologic:  - Alert & Oriented to person, place, year, and situation  - Follows commands  - No aphasia or dysarthria.  - No gaze preference. No apparent hemineglect.  - PERRL, EOMI  - Mild left facial droop, though activates well  - 4/4 on left side (SMA exam), 5/5 on the right    LABS:  Recent Labs   Lab 09/26/20  0537 09/25/20  0354 09/24/20  0655    139 144   POTASSIUM 4.0 3.7 3.4   CHLORIDE 114* 108 113*   CO2 25 25 23   ANIONGAP 6 6 8   * 151* 135*   BUN 13 11 9   CR 0.66 0.51* 0.68   MARGUERITE 8.4* 8.9 8.0*       Recent Labs   Lab 09/26/20  0537   WBC 12.4*   RBC 3.05*   HGB 9.2*   HCT 28.5*   MCV 93    MCH 30.2   MCHC 32.3   RDW 15.8*          IMAGING:  No results found for this or any previous visit (from the past 24 hour(s)).

## 2020-09-26 NOTE — PROGRESS NOTES
Dynamic Gait Index (DGI):The DGI is a measure of balance during gait that is reliable and valid for the elderly and individuals with Parkinson's disease, MS, vestibular disorders, or s/p stroke. Gait assistive device used: None    Patient score: 16/24  Scores ?19/24 indicate an increased risk for falls according to Alexandrea et al 2000  Minimal Detectable Change = 2.9 in community dwelling elderly according to Carmelo et al 2011    Assessment (rationale for performing, application to patient s function & care plan): DGI administered in order to assess falls risk and aid in discharge planning. A score of 16/24 indicating pt is a falls risk and currently far below baseline.   Minutes billed as physical performance test: 10

## 2020-09-27 ENCOUNTER — APPOINTMENT (OUTPATIENT)
Dept: PHYSICAL THERAPY | Facility: CLINIC | Age: 50
DRG: 025 | End: 2020-09-27
Attending: NEUROLOGICAL SURGERY
Payer: COMMERCIAL

## 2020-09-27 ENCOUNTER — APPOINTMENT (OUTPATIENT)
Dept: OCCUPATIONAL THERAPY | Facility: CLINIC | Age: 50
DRG: 025 | End: 2020-09-27
Attending: NEUROLOGICAL SURGERY
Payer: COMMERCIAL

## 2020-09-27 LAB
ANION GAP SERPL CALCULATED.3IONS-SCNC: 6 MMOL/L (ref 3–14)
BUN SERPL-MCNC: 16 MG/DL (ref 7–30)
CALCIUM SERPL-MCNC: 8.7 MG/DL (ref 8.5–10.1)
CHLORIDE SERPL-SCNC: 112 MMOL/L (ref 94–109)
CO2 SERPL-SCNC: 25 MMOL/L (ref 20–32)
CREAT SERPL-MCNC: 0.72 MG/DL (ref 0.66–1.25)
ERYTHROCYTE [DISTWIDTH] IN BLOOD BY AUTOMATED COUNT: 15.8 % (ref 10–15)
GFR SERPL CREATININE-BSD FRML MDRD: >90 ML/MIN/{1.73_M2}
GLUCOSE SERPL-MCNC: 140 MG/DL (ref 70–99)
HCT VFR BLD AUTO: 28.2 % (ref 40–53)
HGB BLD-MCNC: 9.1 G/DL (ref 13.3–17.7)
MAGNESIUM SERPL-MCNC: 2.1 MG/DL (ref 1.6–2.3)
MCH RBC QN AUTO: 31 PG (ref 26.5–33)
MCHC RBC AUTO-ENTMCNC: 32.3 G/DL (ref 31.5–36.5)
MCV RBC AUTO: 96 FL (ref 78–100)
PHOSPHATE SERPL-MCNC: 4.3 MG/DL (ref 2.5–4.5)
PLATELET # BLD AUTO: 191 10E9/L (ref 150–450)
POTASSIUM SERPL-SCNC: 4 MMOL/L (ref 3.4–5.3)
RBC # BLD AUTO: 2.94 10E12/L (ref 4.4–5.9)
SODIUM SERPL-SCNC: 144 MMOL/L (ref 133–144)
WBC # BLD AUTO: 9.1 10E9/L (ref 4–11)

## 2020-09-27 PROCEDURE — 25000132 ZZH RX MED GY IP 250 OP 250 PS 637: Performed by: STUDENT IN AN ORGANIZED HEALTH CARE EDUCATION/TRAINING PROGRAM

## 2020-09-27 PROCEDURE — 80048 BASIC METABOLIC PNL TOTAL CA: CPT | Performed by: NEUROLOGICAL SURGERY

## 2020-09-27 PROCEDURE — 25000131 ZZH RX MED GY IP 250 OP 636 PS 637: Performed by: NURSE PRACTITIONER

## 2020-09-27 PROCEDURE — 25000125 ZZHC RX 250: Performed by: STUDENT IN AN ORGANIZED HEALTH CARE EDUCATION/TRAINING PROGRAM

## 2020-09-27 PROCEDURE — 85027 COMPLETE CBC AUTOMATED: CPT | Performed by: NEUROLOGICAL SURGERY

## 2020-09-27 PROCEDURE — 25800030 ZZH RX IP 258 OP 636: Performed by: NEUROLOGICAL SURGERY

## 2020-09-27 PROCEDURE — 12000001 ZZH R&B MED SURG/OB UMMC

## 2020-09-27 PROCEDURE — 25000128 H RX IP 250 OP 636: Performed by: STUDENT IN AN ORGANIZED HEALTH CARE EDUCATION/TRAINING PROGRAM

## 2020-09-27 PROCEDURE — 97112 NEUROMUSCULAR REEDUCATION: CPT | Mod: GP

## 2020-09-27 PROCEDURE — 97116 GAIT TRAINING THERAPY: CPT | Mod: GP

## 2020-09-27 PROCEDURE — 97535 SELF CARE MNGMENT TRAINING: CPT | Mod: GO | Performed by: OCCUPATIONAL THERAPIST

## 2020-09-27 PROCEDURE — 25000128 H RX IP 250 OP 636: Performed by: NEUROLOGICAL SURGERY

## 2020-09-27 PROCEDURE — 36415 COLL VENOUS BLD VENIPUNCTURE: CPT | Performed by: NEUROLOGICAL SURGERY

## 2020-09-27 PROCEDURE — 84100 ASSAY OF PHOSPHORUS: CPT | Performed by: NEUROLOGICAL SURGERY

## 2020-09-27 PROCEDURE — 83735 ASSAY OF MAGNESIUM: CPT | Performed by: NEUROLOGICAL SURGERY

## 2020-09-27 PROCEDURE — 97530 THERAPEUTIC ACTIVITIES: CPT | Mod: GO | Performed by: OCCUPATIONAL THERAPIST

## 2020-09-27 RX ADMIN — ESCITALOPRAM 10 MG: 10 TABLET, FILM COATED ORAL at 07:43

## 2020-09-27 RX ADMIN — VANCOMYCIN HYDROCHLORIDE 1750 MG: 10 INJECTION, POWDER, LYOPHILIZED, FOR SOLUTION INTRAVENOUS at 02:10

## 2020-09-27 RX ADMIN — SODIUM CHLORIDE, SODIUM GLUCONATE, SODIUM ACETATE, POTASSIUM CHLORIDE AND MAGNESIUM CHLORIDE 145 ML/HR: 526; 502; 368; 37; 30 INJECTION, SOLUTION INTRAVENOUS at 10:01

## 2020-09-27 RX ADMIN — LEVETIRACETAM 1500 MG: 750 TABLET, FILM COATED ORAL at 20:27

## 2020-09-27 RX ADMIN — LEVOFLOXACIN 500 MG: 500 TABLET, FILM COATED ORAL at 20:27

## 2020-09-27 RX ADMIN — DEXAMETHASONE 3 MG: 2 TABLET ORAL at 13:25

## 2020-09-27 RX ADMIN — OXYCODONE HYDROCHLORIDE 15 MG: 15 TABLET, FILM COATED, EXTENDED RELEASE ORAL at 11:30

## 2020-09-27 RX ADMIN — VANCOMYCIN HYDROCHLORIDE 1750 MG: 10 INJECTION, POWDER, LYOPHILIZED, FOR SOLUTION INTRAVENOUS at 18:08

## 2020-09-27 RX ADMIN — VANCOMYCIN HYDROCHLORIDE 1750 MG: 10 INJECTION, POWDER, LYOPHILIZED, FOR SOLUTION INTRAVENOUS at 10:01

## 2020-09-27 RX ADMIN — SODIUM CHLORIDE, SODIUM GLUCONATE, SODIUM ACETATE, POTASSIUM CHLORIDE AND MAGNESIUM CHLORIDE 145 ML/HR: 526; 502; 368; 37; 30 INJECTION, SOLUTION INTRAVENOUS at 21:57

## 2020-09-27 RX ADMIN — METHYLPHENIDATE HYDROCHLORIDE 5 MG: 5 TABLET ORAL at 11:30

## 2020-09-27 RX ADMIN — HEPARIN SODIUM 5000 UNITS: 5000 INJECTION, SOLUTION INTRAVENOUS; SUBCUTANEOUS at 11:30

## 2020-09-27 RX ADMIN — DEXAMETHASONE 3 MG: 2 TABLET ORAL at 05:47

## 2020-09-27 RX ADMIN — LEVETIRACETAM 1500 MG: 750 TABLET, FILM COATED ORAL at 07:43

## 2020-09-27 RX ADMIN — HEPARIN SODIUM 5000 UNITS: 5000 INJECTION, SOLUTION INTRAVENOUS; SUBCUTANEOUS at 20:27

## 2020-09-27 RX ADMIN — LORATADINE 10 MG: 10 TABLET, ORALLY DISINTEGRATING ORAL at 07:43

## 2020-09-27 RX ADMIN — SODIUM CHLORIDE, SODIUM GLUCONATE, SODIUM ACETATE, POTASSIUM CHLORIDE AND MAGNESIUM CHLORIDE 145 ML/HR: 526; 502; 368; 37; 30 INJECTION, SOLUTION INTRAVENOUS at 02:15

## 2020-09-27 RX ADMIN — METHYLPHENIDATE HYDROCHLORIDE 5 MG: 5 TABLET ORAL at 07:49

## 2020-09-27 RX ADMIN — OXYCODONE HYDROCHLORIDE 15 MG: 15 TABLET, FILM COATED, EXTENDED RELEASE ORAL at 22:48

## 2020-09-27 RX ADMIN — DEXAMETHASONE 3 MG: 2 TABLET ORAL at 22:48

## 2020-09-27 RX ADMIN — OMEPRAZOLE 20 MG: 20 CAPSULE, DELAYED RELEASE ORAL at 07:43

## 2020-09-27 RX ADMIN — LISINOPRIL 10 MG: 10 TABLET ORAL at 07:43

## 2020-09-27 RX ADMIN — HEPARIN SODIUM 5000 UNITS: 5000 INJECTION, SOLUTION INTRAVENOUS; SUBCUTANEOUS at 03:55

## 2020-09-27 ASSESSMENT — ACTIVITIES OF DAILY LIVING (ADL)
ADLS_ACUITY_SCORE: 18
ADLS_ACUITY_SCORE: 16
ADLS_ACUITY_SCORE: 18
ADLS_ACUITY_SCORE: 18
ADLS_ACUITY_SCORE: 16
ADLS_ACUITY_SCORE: 18

## 2020-09-27 NOTE — PROGRESS NOTES
Calorie Count  Intake recorded for: 9/26  Total Kcals: 2536 Total Protein: 136g  Kcals from Hospital Food: 2536  Protein: 136g  Kcals from Outside Food (average):0 Protein: 0g  # Meals Recorded: 3 meals (First - 100% grilled cheese & soler sandwich, potato soup, pudding)     (Second -  100% Greek yogurt, omelet w/ soler, cheese & mushrooms, 50% fruit cup, skim milk)     (Third -  100% sugar free hot cocoa, tortilla w/ chicken, soler & swiss, cottage cheese, cantaloupe, apple pie)  # Supplements Recorded: 0

## 2020-09-27 NOTE — PROGRESS NOTES
Social Work Services Progress Note    Hospital Day: 5  Date of Initial Social Work Evaluation:  9/25/2020  Collaborated with:  Patient spouse, Bedside team, EMR    Data:  SW asked to assist patient family with discharge planning    Intervention:  SW contacted by charge RN with request to contact patient wife at bedside. Patient's spouse wanted to confirm discharge plan to SRU on Monday. SW reviewed chart with note of referrals made to both [facility] and [facility] ARU in [place] near patient home. Patient prefers [facility] as this is where all cares are normally addressed. SW reviewed that decision on acceptance would not be made until Monday, but inquiry could be made from bedside to unit SW in the morning. SW and pt spouse agreed that follow up on ARU acceptance would be appropriate in the morning.    Assessment:  See PT/OT, RN, medical team notes    Plan:    Anticipated Disposition:  Facility:  ARU TBD    Barriers to d/c plan:  Acceptance by ARU, medical stability    Follow Up:  SW will continue following for discharge needs    Dung ELDRIDGE  Weekend SW  Pager: [phone]  On Call Pager: [phone] 4pm - Midnight

## 2020-09-27 NOTE — PROGRESS NOTES
[facility], [place]   09/27/2020  Neurosurgery Progress Note:    Assessment:  Gaetano Holley is a 50 year old male POD #4 (9/23/20) s/p right frontal tumor resection. Post operatively was dealing with nausea, now under control.    Plan:  - Serial neuro exams  - Pain control  - PTA keppra  - Dex 3q8  - Vanc and Levaquin for perioperative prophylaxis  - HOB > 30 degrees  - Advance diet as tolerated  - Bowel regimen  - PRN antiemetics  - IVF until taking adequate PO (plasmalyte)  - Calorie counts  - Encourage ambulation  - PT/OT  - SCDs for DVT proph    -----------------------------------  Trevor Tipton MD  Neurosurgery Resident, PGY-2    Please contact neurosurgery resident on call with questions.    Dial * * *[phone], enter [phone] when prompted.   -----------------------------------    Interval History: Vitally stable after triggering sepsis protocol.     Triggered sepsis protocol with high lactate. Fluid resuscitation completed. No change in hemodynamics, or neuro status. Lactate normalized.    Objective:   Temp:  [96.8  F (36  C)-98.6  F (37  C)] 97.6  F (36.4  C)  Pulse:  [66-94] 75  Resp:  [12-18] 16  BP: (113-132)/(83-98) 130/98  SpO2:  [95 %-100 %] 99 %  I/O last 3 completed shifts:  In: -   Out: 5000 [Urine:5000]    Gen: Appears comfortable, NAD  Wound: clean, dry, intact  Neurologic:  - Alert & Oriented to person, place, year, and situation  - Follows commands  - No aphasia or dysarthria.  - No gaze preference. No apparent hemineglect.  - PERRL, EOMI  - Mild left facial droop, though activates well  - 4/4 on left side (SMA exam), 5/5 on the right    LABS:  Recent Labs   Lab 09/27/20  0614 09/26/20  0537 09/25/20  0354    144 139   POTASSIUM 4.0 4.0 3.7   CHLORIDE 112* 114* 108   CO2 25 25 25   ANIONGAP 6 6 6   * 152* 151*   BUN 16 13 11   CR 0.72 0.66 0.51*   MARGUERITE 8.7 8.4* 8.9       Recent Labs   Lab 09/27/20  0614   WBC 9.1   RBC 2.94*   HGB 9.1*   HCT 28.2*    MCV 96   MCH 31.0   MCHC 32.3   RDW 15.8*          IMAGING:  No results found for this or any previous visit (from the past 24 hour(s)).

## 2020-09-28 ENCOUNTER — APPOINTMENT (OUTPATIENT)
Dept: PHYSICAL THERAPY | Facility: CLINIC | Age: 50
DRG: 025 | End: 2020-09-28
Attending: NEUROLOGICAL SURGERY
Payer: COMMERCIAL

## 2020-09-28 LAB
ANION GAP SERPL CALCULATED.3IONS-SCNC: 7 MMOL/L (ref 3–14)
BUN SERPL-MCNC: 16 MG/DL (ref 7–30)
CALCIUM SERPL-MCNC: 8.9 MG/DL (ref 8.5–10.1)
CHLORIDE SERPL-SCNC: 110 MMOL/L (ref 94–109)
CO2 SERPL-SCNC: 27 MMOL/L (ref 20–32)
CREAT SERPL-MCNC: 0.69 MG/DL (ref 0.66–1.25)
ERYTHROCYTE [DISTWIDTH] IN BLOOD BY AUTOMATED COUNT: 15.6 % (ref 10–15)
GFR SERPL CREATININE-BSD FRML MDRD: >90 ML/MIN/{1.73_M2}
GLUCOSE SERPL-MCNC: 134 MG/DL (ref 70–99)
HCT VFR BLD AUTO: 28.7 % (ref 40–53)
HGB BLD-MCNC: 9.4 G/DL (ref 13.3–17.7)
MAGNESIUM SERPL-MCNC: 2 MG/DL (ref 1.6–2.3)
MCH RBC QN AUTO: 31.1 PG (ref 26.5–33)
MCHC RBC AUTO-ENTMCNC: 32.8 G/DL (ref 31.5–36.5)
MCV RBC AUTO: 95 FL (ref 78–100)
PHOSPHATE SERPL-MCNC: 4.5 MG/DL (ref 2.5–4.5)
PLATELET # BLD AUTO: 187 10E9/L (ref 150–450)
POTASSIUM SERPL-SCNC: 3.7 MMOL/L (ref 3.4–5.3)
RBC # BLD AUTO: 3.02 10E12/L (ref 4.4–5.9)
SODIUM SERPL-SCNC: 144 MMOL/L (ref 133–144)
WBC # BLD AUTO: 8.5 10E9/L (ref 4–11)

## 2020-09-28 PROCEDURE — 84100 ASSAY OF PHOSPHORUS: CPT | Performed by: NEUROLOGICAL SURGERY

## 2020-09-28 PROCEDURE — 25000128 H RX IP 250 OP 636: Performed by: STUDENT IN AN ORGANIZED HEALTH CARE EDUCATION/TRAINING PROGRAM

## 2020-09-28 PROCEDURE — 25000132 ZZH RX MED GY IP 250 OP 250 PS 637: Performed by: STUDENT IN AN ORGANIZED HEALTH CARE EDUCATION/TRAINING PROGRAM

## 2020-09-28 PROCEDURE — 12000001 ZZH R&B MED SURG/OB UMMC

## 2020-09-28 PROCEDURE — 25800030 ZZH RX IP 258 OP 636: Performed by: NEUROLOGICAL SURGERY

## 2020-09-28 PROCEDURE — 25000131 ZZH RX MED GY IP 250 OP 636 PS 637: Performed by: NURSE PRACTITIONER

## 2020-09-28 PROCEDURE — 25000128 H RX IP 250 OP 636: Performed by: NEUROLOGICAL SURGERY

## 2020-09-28 PROCEDURE — 80048 BASIC METABOLIC PNL TOTAL CA: CPT | Performed by: NEUROLOGICAL SURGERY

## 2020-09-28 PROCEDURE — 97116 GAIT TRAINING THERAPY: CPT | Mod: GP

## 2020-09-28 PROCEDURE — 85027 COMPLETE CBC AUTOMATED: CPT | Performed by: NEUROLOGICAL SURGERY

## 2020-09-28 PROCEDURE — 25000132 ZZH RX MED GY IP 250 OP 250 PS 637: Performed by: NURSE PRACTITIONER

## 2020-09-28 PROCEDURE — 36415 COLL VENOUS BLD VENIPUNCTURE: CPT | Performed by: NEUROLOGICAL SURGERY

## 2020-09-28 PROCEDURE — 25000131 ZZH RX MED GY IP 250 OP 636 PS 637: Performed by: STUDENT IN AN ORGANIZED HEALTH CARE EDUCATION/TRAINING PROGRAM

## 2020-09-28 PROCEDURE — 83735 ASSAY OF MAGNESIUM: CPT | Performed by: NEUROLOGICAL SURGERY

## 2020-09-28 PROCEDURE — 97110 THERAPEUTIC EXERCISES: CPT | Mod: GP

## 2020-09-28 RX ORDER — DEXAMETHASONE 2 MG/1
2 TABLET ORAL EVERY 8 HOURS SCHEDULED
Status: DISCONTINUED | OUTPATIENT
Start: 2020-10-03 | End: 2020-09-29 | Stop reason: HOSPADM

## 2020-09-28 RX ORDER — LEVOFLOXACIN 500 MG/1
500 TABLET, FILM COATED ORAL DAILY
Status: DISCONTINUED | OUTPATIENT
Start: 2020-09-28 | End: 2020-09-29

## 2020-09-28 RX ORDER — DEXAMETHASONE 1 MG
1 TABLET ORAL EVERY 8 HOURS SCHEDULED
Status: DISCONTINUED | OUTPATIENT
Start: 2020-10-08 | End: 2020-09-29 | Stop reason: HOSPADM

## 2020-09-28 RX ORDER — LEVOFLOXACIN 250 MG/1
250 TABLET, FILM COATED ORAL DAILY
Status: DISCONTINUED | OUTPATIENT
Start: 2020-09-28 | End: 2020-09-28

## 2020-09-28 RX ORDER — SULFAMETHOXAZOLE/TRIMETHOPRIM 800-160 MG
1 TABLET ORAL 2 TIMES DAILY
Status: DISCONTINUED | OUTPATIENT
Start: 2020-09-28 | End: 2020-09-29

## 2020-09-28 RX ADMIN — VANCOMYCIN HYDROCHLORIDE 1750 MG: 10 INJECTION, POWDER, LYOPHILIZED, FOR SOLUTION INTRAVENOUS at 10:49

## 2020-09-28 RX ADMIN — METHYLPHENIDATE HYDROCHLORIDE 5 MG: 5 TABLET ORAL at 09:08

## 2020-09-28 RX ADMIN — LEVOFLOXACIN 500 MG: 500 TABLET, FILM COATED ORAL at 13:37

## 2020-09-28 RX ADMIN — OXYCODONE HYDROCHLORIDE 15 MG: 15 TABLET, FILM COATED, EXTENDED RELEASE ORAL at 23:26

## 2020-09-28 RX ADMIN — DEXAMETHASONE 3 MG: 2 TABLET ORAL at 21:54

## 2020-09-28 RX ADMIN — HEPARIN SODIUM 5000 UNITS: 5000 INJECTION, SOLUTION INTRAVENOUS; SUBCUTANEOUS at 04:10

## 2020-09-28 RX ADMIN — LORATADINE 10 MG: 10 TABLET, ORALLY DISINTEGRATING ORAL at 09:08

## 2020-09-28 RX ADMIN — LEVETIRACETAM 1500 MG: 750 TABLET, FILM COATED ORAL at 09:07

## 2020-09-28 RX ADMIN — LISINOPRIL 10 MG: 10 TABLET ORAL at 09:07

## 2020-09-28 RX ADMIN — VANCOMYCIN HYDROCHLORIDE 1750 MG: 10 INJECTION, POWDER, LYOPHILIZED, FOR SOLUTION INTRAVENOUS at 19:12

## 2020-09-28 RX ADMIN — HEPARIN SODIUM 5000 UNITS: 5000 INJECTION, SOLUTION INTRAVENOUS; SUBCUTANEOUS at 19:01

## 2020-09-28 RX ADMIN — METHYLPHENIDATE HYDROCHLORIDE 5 MG: 5 TABLET ORAL at 13:38

## 2020-09-28 RX ADMIN — OMEPRAZOLE 20 MG: 20 CAPSULE, DELAYED RELEASE ORAL at 06:52

## 2020-09-28 RX ADMIN — DEXAMETHASONE 3 MG: 2 TABLET ORAL at 13:38

## 2020-09-28 RX ADMIN — SULFAMETHOXAZOLE AND TRIMETHOPRIM 1 TABLET: 800; 160 TABLET ORAL at 13:37

## 2020-09-28 RX ADMIN — DEXAMETHASONE 3 MG: 2 TABLET ORAL at 06:06

## 2020-09-28 RX ADMIN — POTASSIUM CHLORIDE 20 MEQ: 1.5 POWDER, FOR SOLUTION ORAL at 06:52

## 2020-09-28 RX ADMIN — ESCITALOPRAM 10 MG: 10 TABLET, FILM COATED ORAL at 09:07

## 2020-09-28 RX ADMIN — OXYCODONE HYDROCHLORIDE 15 MG: 15 TABLET, FILM COATED, EXTENDED RELEASE ORAL at 10:49

## 2020-09-28 RX ADMIN — LEVETIRACETAM 1500 MG: 750 TABLET, FILM COATED ORAL at 20:01

## 2020-09-28 RX ADMIN — HEPARIN SODIUM 5000 UNITS: 5000 INJECTION, SOLUTION INTRAVENOUS; SUBCUTANEOUS at 10:52

## 2020-09-28 RX ADMIN — VANCOMYCIN HYDROCHLORIDE 1750 MG: 10 INJECTION, POWDER, LYOPHILIZED, FOR SOLUTION INTRAVENOUS at 03:20

## 2020-09-28 RX ADMIN — SULFAMETHOXAZOLE AND TRIMETHOPRIM 1 TABLET: 800; 160 TABLET ORAL at 20:01

## 2020-09-28 ASSESSMENT — ACTIVITIES OF DAILY LIVING (ADL)
ADLS_ACUITY_SCORE: 16

## 2020-09-28 NOTE — PROGRESS NOTES
Social Work Services Progress Note    Hospital Day: 6  Date of Initial Social Work Evaluation:  9/25/2020  Collaborated with:  ARU Admissions Coordinators,  Neuro Surgery NP (Shalini Sampson), patient, wife and Dr. Ibarra (PMR)    Data:  SW is following for discharge planning.  Acute rehab placement is recommended and per Neuro Surgery, pt is medically ready for discharge.      Intervention:  SW received a return call from Admissions (Armando) at [facility] who stated that they are full with no anticipated openings until the end of this week or early next week.  SW received a call from Admissions (Ashley) at [facility] who requested PMR eval orders.  SW spoke with Shalini Sampson (NP) who provided PMR orders.   SW updated pt and wife in regards to the above.  SW spoke with Dr. Sanchez PMR who states that she will evaluate pt' today.     Assessment:    Pt and wife are disappointed that [facility] is not an option but state that they look forward to PMR recommendations    Plan:    Anticipated Disposition:  Currently pursuing ARU placement    Barriers to d/c plan:  Await PMR consult followed by decision from [facility]    Follow Up:  SW will continue to follow.    JAZMYN Mcduffie  Social Work, 6A  Phone:  [phone]  Pager:  [phone]  9/28/2020

## 2020-09-28 NOTE — PROGRESS NOTES
[facility], [place]   09/28/2020  Neurosurgery Progress Note:    Assessment:  Gaetano Holley is a 50 year old male POD #5 (9/23/20) s/p right frontal tumor resection. Post operatively was dealing with nausea, now under control.    Plan:  - Serial neuro exams  - Pain control  - PTA keppra  - Dex 3q8  - Vanc and Levaquin for perioperative prophylaxis  - Bowel regimen  - PRN antiemetics  - IVF until taking adequate PO (plasmalyte)  - Calorie counts  - Encourage ambulation  - PT/OT  - SCDs for DVT proph  - ARU dispo    -----------------------------------  Isela Berger MD  Neurosurgery Resident, PGY-2    Please contact neurosurgery resident on call with questions.    Dial * * *[phone], enter [phone] when prompted.   -----------------------------------    Interval History: Ethan LAWSON6 calorie counts 9/26    Objective:   Temp:  [97.7  F (36.5  C)-98.5  F (36.9  C)] 97.8  F (36.6  C)  Pulse:  [] 94  Resp:  [14-16] 16  BP: (109-134)/(84-99) 124/96  SpO2:  [96 %-99 %] 99 %  I/O last 3 completed shifts:  In: 8906.58   Out: -     Gen: Appears comfortable, NAD  Wound: clean, dry, intact  Neurologic:  - Alert & Oriented to person, place, year, and situation  - Follows commands  - No aphasia or dysarthria.  - No gaze preference. No apparent hemineglect.  - PERRL, EOMI  - Mild left facial droop, though activates well  - 4/4 on left side (SMA exam) (improving), 5/5 on the right    LABS:  Recent Labs   Lab 09/28/20  0547 09/27/20  0614 09/26/20  0537    144 144   POTASSIUM 3.7 4.0 4.0   CHLORIDE 110* 112* 114*   CO2 27 25 25   ANIONGAP 7 6 6   * 140* 152*   BUN 16 16 13   CR 0.69 0.72 0.66   MARGUERITE 8.9 8.7 8.4*       Recent Labs   Lab 09/28/20  0547   WBC 8.5   RBC 3.02*   HGB 9.4*   HCT 28.7*   MCV 95   MCH 31.1   MCHC 32.8   RDW 15.6*          IMAGING:  No results found for this or any previous visit (from the past 24 hour(s)).

## 2020-09-28 NOTE — PROGRESS NOTES
Calorie Count  Intake recorded for: 9/27  Total Kcals: 738 Total Protein: 23g  Kcals from Hospital Food: 738   Protein: 23g  Kcals from Outside Food (average):0 Protein: 0g  # Meals Recorded: 3 meals ordered, 1 meal recorded. (First - 100% Apple juice, chocolate pudding, greek yogurt, sausage dilia, oatmeal w/ raisins and brown sugar)  # Supplements Recorded: 0

## 2020-09-28 NOTE — CONSULTS
[facility]   PM&R CONSULT    Consulting Provider: Shalini Fitch   Reason for Consult: Assessment of rehabilitation   Location of Patient: 6212   Date of Encounter: 9/28/2020   Date of Admission: 9/23/2020        ASSESSMENT/PLAN:    Mr. Gaetano Holley is a Right handed 50 year old yo male who presents with left side weakness, neglect, imaired balance, incoordination,and deficits in cognitive.   He is on keppra for prophylaxis, he did have seizures prior to surgery and has not had since per patient's wife.   Agree with admission to the ARU with intense needs in PT/OT/SLP with an ELOS of 10-12 days.   Counseled the patient and his wife regarding the rehab needs and prophylaxis. They are agreeable to the plan of care       Thank you for consulting the PM&R Department.   For any questions, please feel free to page me at [phone]       Rizwana Ibarra MD   Department of PM&R        HPI:    Gaetano Holley is a 50 year old male with history of Glioblastoma who was admitted on 9/23/2020 for recurrent growth of GBM and underwent right frontal tumor resection via a cranioplasty and microdissection.     Post operatively he was dealing with nausea, now under control.  He may be getting chemotherapy or radiation following rehabilitation.   He was seen by PT today and went for a walk, however feels quite sore in the left hamstrings. He did require min assist of 2 for the mobility with a walker, limited by weakness and neglect. He has significant gait variation with the weakness and neglect. His DGI is 16/24 indicating a high risk for falls.     Tolerating regular diet and activity out of bed very well.   Up in bedside chair all day  Voiding good amounts per nursing      PREVIOUS LEVEL OF FUNCTION   He was independent with basic mobility and basic ADL's prior to admit. He was not driving   He was independent with his basic adl's.       CURRENT FUNCTION   In PT, he ambulates total of  >1000ft with CGA-min Ax1 2/2 occasional lateral LOB especially when turning.   Demo slow speed and short step length with L>R, L neglect causing occasional running into doorways and objects in halls.   DGI is 16/24 indicating increased risk for falls.      In OT, he shows improved LUE fine motor coordination. He is demonstrating improved scanning to L side, decreased FMC in L hand, and limited sustained attention.   Pt requiring cues to attend to rules of game after ~5 min.   Pt ambulated <> toilet with close SBA-CGA and cues to attend to objects on L side.   Completed toileting with Min A from wife.       Per RN, he is somewhat impulsive. Has dribbling of urine.   On regular diet     LIVING SITUATION/SUPPORT  Lives with his wife, who is at bedside and very supportive.         SOCIAL HISTORY  Social History     Socioeconomic History     Marital status:      Spouse name: Shalini     Number of children: 2     Years of education: None     Highest education level: None   Occupational History     None   Social Needs     Financial resource strain: None     Food insecurity     Worry: None     Inability: None     Transportation needs     Medical: None     Non-medical: None   Tobacco Use     Smoking status: Never Smoker     Smokeless tobacco: Never Used   Substance and Sexual Activity     Alcohol use: Yes     Comment: occasionally     Drug use: Never     Sexual activity: None   Lifestyle     Physical activity     Days per week: None     Minutes per session: None     Stress: None   Relationships     Social connections     Talks on phone: None     Gets together: None     Attends [employer] service: None     Active member of club or organization: None     Attends meetings of clubs or organizations: None     Relationship status: None     Intimate partner violence     Fear of current or ex partner: None     Emotionally abused: None     Physically abused: None     Forced sexual activity: None   Other Topics Concern     None    Social History Narrative     None         Past Medical History:  Past Medical History:   Diagnosis Date     Abdominal pain 2008    unspecified      Acute cholecystitis 07/01/2008     Depressive disorder 1998     Erectile dysfunction 2008     Hypertension      Impaired fasting glucose 12/12/2008     Iron deficiency anemia      Seizures (H)            Current Medications:  Current Facility-Administered Medications   Medication     acetaminophen (TYLENOL) tablet 650 mg     dexamethasone (DECADRON) tablet 3 mg    Followed by     [START ON 10/3/2020] dexamethasone (DECADRON) tablet 2 mg    Followed by     [START ON 10/8/2020] dexamethasone (DECADRON) tablet 1 mg     escitalopram (LEXAPRO) tablet 10 mg     fluticasone (FLONASE) 50 MCG/ACT spray 50 spray     heparin ANTICOAGULANT injection 5,000 Units     levETIRAcetam (KEPPRA) tablet 1,500 mg     levofloxacin (LEVAQUIN) tablet 500 mg     lidocaine (LMX4) kit     lidocaine 1 % 0.1-1 mL     lisinopril (ZESTRIL) tablet 10 mg     loratadine (CLARITIN REDITABS) ODT tab 10 mg     magnesium sulfate 2 g in water intermittent infusion     magnesium sulfate 4 g in 100 mL sterile water (premade)     methylphenidate (RITALIN) tablet 5 mg     metoclopramide (REGLAN) tablet 10 mg    Or     metoclopramide (REGLAN) injection 10 mg     naloxone (NARCAN) injection 0.1-0.4 mg     omeprazole (priLOSEC) CR capsule 20 mg     ondansetron (ZOFRAN-ODT) ODT tab 4 mg    Or     ondansetron (ZOFRAN) injection 4 mg     oxyCODONE (OxyCONTIN) 12 hr tablet 15 mg     oxyCODONE (ROXICODONE) tablet 5-10 mg     polyethylene glycol (MIRALAX) Packet 17 g     potassium chloride (KLOR-CON) Packet 20-40 mEq     potassium chloride 10 mEq in 100 mL intermittent infusion with 10 mg lidocaine     potassium chloride 10 mEq in 100 mL sterile water intermittent infusion (premix)     potassium chloride 20 mEq in 50 mL intermittent infusion     potassium chloride ER (KLOR-CON M) CR tablet 20-40 mEq     potassium  phosphate 10 mmol in D5W 250 mL intermittent infusion     potassium phosphate 15 mmol in D5W 250 mL intermittent infusion     potassium phosphate 20 mmol in D5W 250 mL intermittent infusion     potassium phosphate 20 mmol in D5W 500 mL intermittent infusion     potassium phosphate 25 mmol in D5W 500 mL intermittent infusion     prochlorperazine (COMPAZINE) injection 10 mg    Or     prochlorperazine (COMPAZINE) tablet 10 mg    Or     prochlorperazine (COMPAZINE) suppository 25 mg     senna-docusate (SENOKOT-S/PERICOLACE) 8.6-50 MG per tablet 1 tablet    Or     senna-docusate (SENOKOT-S/PERICOLACE) 8.6-50 MG per tablet 2 tablet     sodium chloride (PF) 0.9% PF flush 3 mL     sodium chloride (PF) 0.9% PF flush 3 mL     sulfamethoxazole-trimethoprim (BACTRIM DS) 800-160 MG per tablet 1 tablet     vancomycin (VANCOCIN) 1,750 mg in sodium chloride 0.9 % 500 mL intermittent infusion         Review of Systems:  Total of ten systems reviewed, pertinent positives and negatives as follows  Instability with standing and walking.    Feels generally fatigued  Reports weakness in left side   Denies any tingling or numbness  Scalp incision   Tolerating regular diet   LBM today  No chest pain. No cough or SOB.  No visual changes.   No headache or photophobia.   No nausea, or abdominal pain.  Slept poorly last night  No joint pain, muscle pain or swelling.  Mood is good, denies any symptoms of depression.   Remainder of the review of the systems was negative.          Labs   Lab Results   Component Value Date    WBC 8.5 09/28/2020    HGB 9.4 (L) 09/28/2020    HCT 28.7 (L) 09/28/2020    MCV 95 09/28/2020     09/28/2020     Lab Results   Component Value Date     09/28/2020    POTASSIUM 3.7 09/28/2020    CHLORIDE 110 (H) 09/28/2020    CO2 27 09/28/2020     (H) 09/28/2020     Lab Results   Component Value Date    GFRESTIMATED >90 09/28/2020    GFRESTBLACK >90 09/28/2020     No results found for: AST, ALT, GGT,  ALKPHOS, BILITOTAL, BILICONJ, BILIDIRECT, LEONORA  No results found for: INR  Lab Results   Component Value Date    BUN 16 09/28/2020    CR 0.69 09/28/2020         ON EXAMINATION:  Vitals:    09/27/20 1952 09/27/20 2333 09/28/20 0412 09/28/20 0748   BP: (!) 129/91 (!) 123/90 (!) 134/94 (!) 124/96   BP Location: Left arm Right arm     Pulse: 92 95 78 94   Resp: 16 14 14 16   Temp: 98.5  F (36.9  C) 98.3  F (36.8  C) 97.7  F (36.5  C) 97.8  F (36.6  C)   TempSrc: Oral Oral Oral Oral   SpO2: 98% 99% 96% 99%   Weight:       Height:           Physical Exam:  Blood pressure (!) 124/96, pulse 94, temperature 97.8  F (36.6  C), temperature source Oral, resp. rate 16, height 1.829 m (6'), weight 104 kg (229 lb 4.5 oz), SpO2 99 %.    GEN: NAD, seated comfortably in bed  Nystagmus on looking to the right   No ptosis   Able to accommodate and converge   He is alert, appropriate, cooperative  Speech; paucity of speech   Speech is slow   Processing is delayed   Poor recall   Comprehension to simple commands is intact   HEENT: scalp incision is clean dry ANDREW   No bogginess around the incision   MSK: full passive ROM at all major joints of the bilaterally upper and lower extremities  No muscle atrophy noted  ABD: soft, non tender, pos BS  NEURO:   CRANIAL NERVES: Discs flat. Pupils equal, round and reactive to light.  Extraocular movements full. Visual fields full. Face moves symmetrically.  Tongue midline. Hearing intact to finger rubbing.    Sensation: sensation to light touch is intact throughout   Strength: 3-4/5 on the LUE/LLE     SKIN: no rashes or lesions noted.     EXT: no edema bilaterally, chronic stasis changes, no ulcers.   PSYCH: normal affect.       Thank you for the consult. Please call for any questions. Pager number [phone]   Total of 70 min spent in this encounter more than 50% in counseling and coordination.     Rizwana Ibarra

## 2020-09-28 NOTE — PROGRESS NOTES
Social Work Services Progress Note    Hospital Day: 6  Date of Initial Social Work Evaluation:  9/25/2020  Collaborated with:  Shalini Sampson NP Neuro Surgery, Admissions at [facility] ([facility]), patient's wife    Data:  SW is following pt for acute rehab placement.      Intervention:  SW attended am unit rounds and per Shalini Sampson NP, pt is medically ready for discharge.  SW received a call from Admissions (Ashley) at [facility] requesting weekend progress notes and asking if pt would need chemo or radiation during ARU stay. SW faxed weekend  Progress notes to [facility].   JENNIFER spoke with Shalini Sampson NP who states that pt will not receive chemo/rad during ARU stay.  SW relayed this information to [facility].  SW phoned Admissions at [facility] and left a message informing that pt is medically ready for discharge and asking whether or not they can accept pt for admit.  SW faxed weekend progress notes to [facility]. SW phoned pt's wife and updated. Wife states that [facility] is first choice.    Assessment:  Pt's wife supports pursuit of ARU placement    Plan:    Anticipated Disposition:  Acute rehab placement    Barriers to d/c plan:  Await decisions from the above facility's    Follow Up:  SW will continue to follow for discharge planning.    JAZMYN Mcduffie  Social Work, 6A  Phone:  [phone]  Pager:  [phone]  9/28/2020

## 2020-09-29 ENCOUNTER — PREP FOR PROCEDURE (OUTPATIENT)
Dept: NEUROSURGERY | Facility: CLINIC | Age: 50
End: 2020-09-29

## 2020-09-29 ENCOUNTER — TRANSFERRED RECORDS (OUTPATIENT)
Dept: HEALTH INFORMATION MANAGEMENT | Facility: CLINIC | Age: 50
End: 2020-09-29

## 2020-09-29 ENCOUNTER — APPOINTMENT (OUTPATIENT)
Dept: PHYSICAL THERAPY | Facility: CLINIC | Age: 50
DRG: 025 | End: 2020-09-29
Attending: NEUROLOGICAL SURGERY
Payer: COMMERCIAL

## 2020-09-29 VITALS
RESPIRATION RATE: 18 BRPM | DIASTOLIC BLOOD PRESSURE: 100 MMHG | OXYGEN SATURATION: 98 % | BODY MASS INDEX: 31.05 KG/M2 | HEART RATE: 86 BPM | TEMPERATURE: 97.9 F | HEIGHT: 72 IN | SYSTOLIC BLOOD PRESSURE: 122 MMHG | WEIGHT: 229.28 LBS

## 2020-09-29 DIAGNOSIS — C71.9 GLIOBLASTOMA (H): Primary | ICD-10-CM

## 2020-09-29 LAB
ANION GAP SERPL CALCULATED.3IONS-SCNC: 6 MMOL/L (ref 3–14)
BUN SERPL-MCNC: 18 MG/DL (ref 7–30)
CALCIUM SERPL-MCNC: 8.9 MG/DL (ref 8.5–10.1)
CHLORIDE SERPL-SCNC: 110 MMOL/L (ref 94–109)
CO2 SERPL-SCNC: 26 MMOL/L (ref 20–32)
CREAT SERPL-MCNC: 0.82 MG/DL (ref 0.66–1.25)
ERYTHROCYTE [DISTWIDTH] IN BLOOD BY AUTOMATED COUNT: 15.2 % (ref 10–15)
GFR SERPL CREATININE-BSD FRML MDRD: >90 ML/MIN/{1.73_M2}
GLUCOSE SERPL-MCNC: 133 MG/DL (ref 70–99)
HCT VFR BLD AUTO: 31.2 % (ref 40–53)
HGB BLD-MCNC: 10.3 G/DL (ref 13.3–17.7)
MAGNESIUM SERPL-MCNC: 2.1 MG/DL (ref 1.6–2.3)
MCH RBC QN AUTO: 30.9 PG (ref 26.5–33)
MCHC RBC AUTO-ENTMCNC: 33 G/DL (ref 31.5–36.5)
MCV RBC AUTO: 94 FL (ref 78–100)
PHOSPHATE SERPL-MCNC: 5.6 MG/DL (ref 2.5–4.5)
PLATELET # BLD AUTO: 202 10E9/L (ref 150–450)
POTASSIUM SERPL-SCNC: 4 MMOL/L (ref 3.4–5.3)
RBC # BLD AUTO: 3.33 10E12/L (ref 4.4–5.9)
SODIUM SERPL-SCNC: 143 MMOL/L (ref 133–144)
WBC # BLD AUTO: 9.2 10E9/L (ref 4–11)

## 2020-09-29 PROCEDURE — 97112 NEUROMUSCULAR REEDUCATION: CPT | Mod: GP

## 2020-09-29 PROCEDURE — 97116 GAIT TRAINING THERAPY: CPT | Mod: GP

## 2020-09-29 PROCEDURE — 80048 BASIC METABOLIC PNL TOTAL CA: CPT | Performed by: NEUROLOGICAL SURGERY

## 2020-09-29 PROCEDURE — 25000132 ZZH RX MED GY IP 250 OP 250 PS 637: Performed by: STUDENT IN AN ORGANIZED HEALTH CARE EDUCATION/TRAINING PROGRAM

## 2020-09-29 PROCEDURE — 83735 ASSAY OF MAGNESIUM: CPT | Performed by: NEUROLOGICAL SURGERY

## 2020-09-29 PROCEDURE — 25000131 ZZH RX MED GY IP 250 OP 636 PS 637: Performed by: STUDENT IN AN ORGANIZED HEALTH CARE EDUCATION/TRAINING PROGRAM

## 2020-09-29 PROCEDURE — 25000128 H RX IP 250 OP 636: Performed by: STUDENT IN AN ORGANIZED HEALTH CARE EDUCATION/TRAINING PROGRAM

## 2020-09-29 PROCEDURE — 84100 ASSAY OF PHOSPHORUS: CPT | Performed by: NEUROLOGICAL SURGERY

## 2020-09-29 PROCEDURE — 36415 COLL VENOUS BLD VENIPUNCTURE: CPT | Performed by: NEUROLOGICAL SURGERY

## 2020-09-29 PROCEDURE — 25000132 ZZH RX MED GY IP 250 OP 250 PS 637: Performed by: NURSE PRACTITIONER

## 2020-09-29 PROCEDURE — 25800030 ZZH RX IP 258 OP 636: Performed by: NEUROLOGICAL SURGERY

## 2020-09-29 PROCEDURE — 25000128 H RX IP 250 OP 636: Performed by: NEUROLOGICAL SURGERY

## 2020-09-29 PROCEDURE — 85027 COMPLETE CBC AUTOMATED: CPT | Performed by: NEUROLOGICAL SURGERY

## 2020-09-29 RX ORDER — DEXAMETHASONE 2 MG/1
2 TABLET ORAL EVERY 8 HOURS
Qty: 15 TABLET | Refills: 0 | Status: SHIPPED | OUTPATIENT
Start: 2020-10-03 | End: 2020-09-29

## 2020-09-29 RX ORDER — DEXAMETHASONE 1 MG
3 TABLET ORAL EVERY 8 HOURS
Qty: 36 TABLET | Refills: 0 | Status: ON HOLD | OUTPATIENT
Start: 2020-09-29 | End: 2020-10-19

## 2020-09-29 RX ORDER — CLINDAMYCIN HCL 300 MG
300 CAPSULE ORAL EVERY 6 HOURS
Qty: 28 CAPSULE | Refills: 0 | Status: ON HOLD | OUTPATIENT
Start: 2020-09-29 | End: 2020-10-19

## 2020-09-29 RX ORDER — AMOXICILLIN 250 MG
1 CAPSULE ORAL 2 TIMES DAILY
COMMUNITY
Start: 2020-09-29

## 2020-09-29 RX ORDER — DEXAMETHASONE 1 MG
1 TABLET ORAL EVERY 8 HOURS
Qty: 12 TABLET | Refills: 0 | Status: SHIPPED | OUTPATIENT
Start: 2020-10-08 | End: 2020-09-29

## 2020-09-29 RX ORDER — DEXAMETHASONE 1.5 MG/1
3 TABLET ORAL EVERY 8 HOURS
Qty: 24 TABLET | Refills: 0 | Status: ON HOLD | OUTPATIENT
Start: 2020-09-29 | End: 2020-10-19

## 2020-09-29 RX ORDER — DEXAMETHASONE 1.5 MG/1
3 TABLET ORAL EVERY 8 HOURS
Qty: 24 TABLET | Refills: 0 | Status: SHIPPED | OUTPATIENT
Start: 2020-09-29 | End: 2020-09-29

## 2020-09-29 RX ORDER — DEXAMETHASONE 2 MG/1
2 TABLET ORAL EVERY 8 HOURS
Qty: 15 TABLET | Refills: 0 | Status: ON HOLD | OUTPATIENT
Start: 2020-10-03 | End: 2020-10-19

## 2020-09-29 RX ORDER — OXYCODONE HYDROCHLORIDE 5 MG/1
5-10 TABLET ORAL
Qty: 30 TABLET | Refills: 0 | Status: ON HOLD | OUTPATIENT
Start: 2020-09-29 | End: 2020-10-19

## 2020-09-29 RX ORDER — OXYCODONE 13.5 MG/1
13.5 CAPSULE, EXTENDED RELEASE ORAL EVERY 12 HOURS
Refills: 0 | Status: ON HOLD | COMMUNITY
Start: 2020-09-29 | End: 2020-10-19

## 2020-09-29 RX ORDER — DEXAMETHASONE 1 MG
1 TABLET ORAL EVERY 8 HOURS
Qty: 12 TABLET | Refills: 0 | Status: ON HOLD | OUTPATIENT
Start: 2020-10-08 | End: 2020-10-19

## 2020-09-29 RX ORDER — PROCHLORPERAZINE MALEATE 10 MG
10 TABLET ORAL PRN
COMMUNITY
Start: 2020-09-29

## 2020-09-29 RX ORDER — ESCITALOPRAM OXALATE 10 MG/1
10 TABLET ORAL DAILY
COMMUNITY
Start: 2020-09-29

## 2020-09-29 RX ORDER — ONDANSETRON 8 MG/1
8 TABLET, FILM COATED ORAL DAILY
COMMUNITY
Start: 2020-09-29

## 2020-09-29 RX ORDER — OXYCODONE HYDROCHLORIDE 5 MG/1
5-10 TABLET ORAL
Qty: 30 TABLET | Refills: 0 | Status: SHIPPED | OUTPATIENT
Start: 2020-09-29 | End: 2020-09-29

## 2020-09-29 RX ORDER — METHYLPHENIDATE HYDROCHLORIDE 5 MG/1
5 TABLET ORAL 2 TIMES DAILY
Refills: 0 | COMMUNITY
Start: 2020-09-29

## 2020-09-29 RX ORDER — LISINOPRIL 10 MG/1
10 TABLET ORAL DAILY
COMMUNITY
Start: 2020-09-29

## 2020-09-29 RX ORDER — HEPARIN SODIUM 5000 [USP'U]/.5ML
5000 INJECTION, SOLUTION INTRAVENOUS; SUBCUTANEOUS EVERY 8 HOURS
Status: ON HOLD | COMMUNITY
Start: 2020-09-29 | End: 2020-10-19

## 2020-09-29 RX ORDER — SILDENAFIL CITRATE 20 MG/1
20 TABLET ORAL PRN
COMMUNITY
Start: 2020-09-29

## 2020-09-29 RX ORDER — OXYCODONE HYDROCHLORIDE 15 MG/1
15 TABLET, FILM COATED, EXTENDED RELEASE ORAL EVERY 12 HOURS
Qty: 28 TABLET | Refills: 0 | Status: SHIPPED | OUTPATIENT
Start: 2020-09-29 | End: 2020-09-29

## 2020-09-29 RX ORDER — FLUTICASONE PROPIONATE 50 MCG
50 SPRAY, SUSPENSION (ML) NASAL DAILY
COMMUNITY
Start: 2020-09-29

## 2020-09-29 RX ORDER — CLINDAMYCIN HCL 300 MG
300 CAPSULE ORAL EVERY 6 HOURS SCHEDULED
Status: DISCONTINUED | OUTPATIENT
Start: 2020-09-29 | End: 2020-09-29 | Stop reason: HOSPADM

## 2020-09-29 RX ORDER — OXYCODONE 13.5 MG/1
CAPSULE, EXTENDED RELEASE ORAL
Refills: 0 | COMMUNITY
Start: 2020-09-29 | End: 2020-09-29

## 2020-09-29 RX ORDER — LEVETIRACETAM 750 MG/1
750 TABLET ORAL 2 TIMES DAILY
COMMUNITY
Start: 2020-09-29

## 2020-09-29 RX ORDER — CICLOPIROX OLAMINE 7.7 MG/G
CREAM TOPICAL PRN
COMMUNITY
Start: 2020-09-29

## 2020-09-29 RX ORDER — POLYETHYLENE GLYCOL 3350 17 G/17G
17 POWDER, FOR SOLUTION ORAL DAILY
COMMUNITY
Start: 2020-09-30

## 2020-09-29 RX ADMIN — VANCOMYCIN HYDROCHLORIDE 1750 MG: 10 INJECTION, POWDER, LYOPHILIZED, FOR SOLUTION INTRAVENOUS at 02:27

## 2020-09-29 RX ADMIN — OXYCODONE HYDROCHLORIDE 15 MG: 15 TABLET, FILM COATED, EXTENDED RELEASE ORAL at 10:58

## 2020-09-29 RX ADMIN — DEXAMETHASONE 3 MG: 2 TABLET ORAL at 06:11

## 2020-09-29 RX ADMIN — LORATADINE 10 MG: 10 TABLET, ORALLY DISINTEGRATING ORAL at 07:38

## 2020-09-29 RX ADMIN — LISINOPRIL 10 MG: 10 TABLET ORAL at 07:39

## 2020-09-29 RX ADMIN — HEPARIN SODIUM 5000 UNITS: 5000 INJECTION, SOLUTION INTRAVENOUS; SUBCUTANEOUS at 02:30

## 2020-09-29 RX ADMIN — SULFAMETHOXAZOLE AND TRIMETHOPRIM 1 TABLET: 800; 160 TABLET ORAL at 07:38

## 2020-09-29 RX ADMIN — METHYLPHENIDATE HYDROCHLORIDE 5 MG: 5 TABLET ORAL at 07:51

## 2020-09-29 RX ADMIN — OMEPRAZOLE 20 MG: 20 CAPSULE, DELAYED RELEASE ORAL at 07:38

## 2020-09-29 RX ADMIN — METHYLPHENIDATE HYDROCHLORIDE 5 MG: 5 TABLET ORAL at 11:49

## 2020-09-29 RX ADMIN — LEVOFLOXACIN 500 MG: 500 TABLET, FILM COATED ORAL at 07:38

## 2020-09-29 RX ADMIN — ESCITALOPRAM 10 MG: 10 TABLET, FILM COATED ORAL at 07:39

## 2020-09-29 RX ADMIN — LEVETIRACETAM 1500 MG: 750 TABLET, FILM COATED ORAL at 07:38

## 2020-09-29 RX ADMIN — CLINDAMYCIN HYDROCHLORIDE 300 MG: 300 CAPSULE ORAL at 10:59

## 2020-09-29 ASSESSMENT — ACTIVITIES OF DAILY LIVING (ADL)
ADLS_ACUITY_SCORE: 16
ADLS_ACUITY_SCORE: 15
ADLS_ACUITY_SCORE: 16
ADLS_ACUITY_SCORE: 16

## 2020-09-29 NOTE — PROGRESS NOTES
Social Work Services Progress Note    Hospital Day: 7  Date of Initial Social Work Evaluation:  9/25/2020  Collaborated with: Admissions at [facility] ([place] [phone])    Data:  SW is following for discharge planning. Acute rehab placement is recommended.  Pt medically ready for discharge on 9/28/2020.    [facility] requested PMR eval.    Intervention:  JENNIFER faxed completed PMR eval to [facility].  JENNIFER phoned Admissions at [facility] ([place] [phone]) and informed that the PMR eval was faxed.  Ashley states that they will review the PMR eval and if they accept pt for admit, they will submit for prior auth to insurance.        Plan:    Anticipated Disposition:  Acute rehab placement    Barriers to d/c plan:  Await decision regarding [facility]    Follow Up:  JENNIFER will continue to follow.    JAZMYN Mcduffie  Social Work, 6A  Phone:  [phone]  Pager:  [phone]  9/29/2020

## 2020-09-29 NOTE — PROGRESS NOTES
This note was accidentally written in the wrong pt's chart.    JAZMYN Mcduffie  Social Work, 6A  Phone:  [phone]  Pager:  [phone]  9/29/2020

## 2020-09-29 NOTE — PROGRESS NOTES
[facility], [place]   09/29/2020  Neurosurgery Progress Note:    Assessment:  Gaetano Holley is a 50 year old male POD #6 (9/23/20) s/p right frontal tumor resection. Post operatively was dealing with nausea, now under control.    Plan:  - Serial neuro exams  - Pain control  - PTA keppra  - Dex 3q8  - Keflex and Levaquin for perioperative prophylaxis  - Bowel regimen  - PRN antiemetics  - IVF until taking adequate PO (plasmalyte)  - Calorie counts  - Encourage ambulation  - PT/OT  - SCDs for DVT proph  - ARU dispo    -----------------------------------  Isela Berger MD  Neurosurgery Resident, PGY-2    Please contact neurosurgery resident on call with questions.    Dial * * [phone], enter [phone] when prompted.   -----------------------------------    Interval History: NAEON, started taper for dex, switched abx, PMR consult for ARU placement    Objective:   Temp:  [97.7  F (36.5  C)-98.3  F (36.8  C)] 98  F (36.7  C)  Pulse:  [64-88] 81  Resp:  [14-18] 18  BP: (112-137)/() 137/107  SpO2:  [95 %-99 %] 99 %  I/O last 3 completed shifts:  In: 360 [P.O.:360]  Out: 200 [Urine:200]    Gen: Appears comfortable, NAD  Wound: clean, dry, intact  Neurologic:  - Alert & Oriented to person, place, year, and situation  - Follows commands  - No aphasia or dysarthria.  - No gaze preference. No apparent hemineglect.  - PERRL, EOMI  - Mild left facial droop, though activates well  - 4/4 on left side (SMA exam) (improving), 5/5 on the right    LABS:  Recent Labs   Lab 09/29/20  0549 09/28/20  0547 09/27/20  0614    144 144   POTASSIUM 4.0 3.7 4.0   CHLORIDE 110* 110* 112*   CO2 26 27 25   ANIONGAP 6 7 6   * 134* 140*   BUN 18 16 16   CR 0.82 0.69 0.72   MARGUERITE 8.9 8.9 8.7       Recent Labs   Lab 09/29/20  0549   WBC 9.2   RBC 3.33*   HGB 10.3*   HCT 31.2*   MCV 94   MCH 30.9   MCHC 33.0   RDW 15.2*          IMAGING:  No results found for this or any previous visit (from the past  24 hour(s)).

## 2020-09-29 NOTE — PROGRESS NOTES
Acute Kidney Injury Assessment: Stable  Baseline Creatinine: 0.51-0.72 mg/dL  Creatinine increased to:  0.82 mg/dL  Most recent creatinine result:   Creatinine   Date Value Ref Range Status   09/29/2020 0.82 0.66 - 1.25 mg/dL Final     Cause: Dehydration  Treatment: Encouraging PO intake

## 2020-09-29 NOTE — PLAN OF CARE
[id number]      No changes from previous shift note.   Neuros unchanged, VSS.   Continue to monitor and follow POC.    
4A Discharge Planner PT   Patient plan for discharge: ARU  Current status: VSS on RA. R eye swollen. L visual deficits vs L neglect. STSs, min A. Ambulated ~ 400 ft, min A with mild-moderate balance deficits 2/2 poor L LE step length, forward trunk/body lean, and impulsive gait speed. Demonstrated a L lateral path deviation. When fatigued gait impairments are more prominent. During the day only, when fully alert, and not for urgent needs, pt may safely ambulate to bathroom with nursing, hand on GB + R UE support on IV pole. Overnight use commode and urinal.     Barriers to return to prior living situation: medical status, current mobility, level of A, falls risk, L sided deficits (some baseline though at baseline pt able to ambulate blocks safely without supervision/assistance), RJ home/within home  Recommendations for discharge: ARU  Rationale for recommendations: Pt would greatly benefit from intensive rehab to progress functional mobility towards IND PLOF, good home support, and motivated. Anticipate pt will be able to tolerate 3 hours of therapy/day at time of discharge.          Entered by: Marli Post 09/25/2020 11:39 AM       
4A Discharge Planner PT   Patient plan for discharge: open to rehab (pt's SO Shalini present and agreed to rehab)  Current status: PT evaluation completed. Treatment indicated as pt is mobilizing well below Ind/Ronaldo baseline. Adhered to craniotomy precautions. STS from recliner, min-mod A x 2. Stepping forward/backwards (able to step backwards with R LE not L LE) - significant cueing and time for motor processing. Significant R sided body lean. OH ceiling lift from recliner > bed. Limited by fatigue/lethargy. Recs up with OH Ceiling lift.     Barriers to return to prior living situation: medical status, current mob, level of A, acute on chronic L sided weakness/coordination deficits, craniotomy precautions  Recommendations for discharge: ARU   Rationale for recommendations: Pt would greatly benefit from intensive rehab to progress functional mobility towards IND PLOF, good home support, and motivated. Anticipate pt will be able to tolerate 3 hours of therapy/day at time of discharge.          Entered by: Marli Post 09/24/2020 2:44 PM       
[id number]  Status: Patient admitted on 9/23, now POD# 2 s/p right frontal tumor resection  LABS: Lactic acid 3.3, rapid response called, 500mL NS bolus given re-draw at 1900 3.1, MD Berger notified, 1000mL LR bolus given, re-draw at 0000  Vitals: VSS on 15L oxygen facemask for pneumocephalus  Neuros: A&Ox4, slow to respond, R periorbital swelling, PERRLA, Left sided weakness 3/4, Left pronator drift, right side 4/5, forgetful, flat affect  IV: 2 PIV's, one infusing plasmalyte at 125mL, additional IV infusing 1000mL LR bolus  Resp/trach: Lung sounds diminished in lower lobes  Diet: Regular  Bowel status: No BM, BS+, passing flatus  : Voiding spontaneously in urinal  Skin: R crani incision with sutures, ANDREW, CDI  Pain: Denies  Activity: Up with A2, patient repositions self independently   Social: Calm and cooperative with cares, wife at bedside, supportive  Plan: Discharge to ARU when medically stable, continue to monitor and follow POC  
6A OT    Discharge Planner OT   Patient plan for discharge: ARU  Current status: Facilitated improved LUE fine motor coordination and challenged pt's L-sided inattention through engaging in x15 min of familiar table top activity requiring visual scanning, BUE reaching, grasping and transporting small objects, and sustained attention. Pt demonstrating improved scanning to L side, decreased FMC in L hand, and limited sustained attention. Pt requiring cues to attend to rules of game after ~5 min. Pt ambulated <> toilet with close SBA-CGA and cues to attend to objects on L side. Completed toileting with Min A from wife. Completed oral hygiene, standing at sink, with SBA and good attention to sequencing and completeness.   Barriers to return to prior living situation: medical status, balance, L side weakness/inattention, increased A for safe functional mobility and ADLs.   Recommendations for discharge: ARU  Rationale for recommendations: Pt is below PLOF and will benefit from multidisciplinary therapies to progress ADL IND and safety. Pt will be able to tolerate 3 hrs therapy/day. Pt has supportive family for ultimate discharge home.        Entered by: Stephanie Carias 09/27/2020 2:49 PM       
6A OT    Discharge Planner OT   Patient plan for discharge: Not stated today  Current status: Reinforced education in crani precautions. Supine > seated EOB with CGA, cues for safety and attending to L side, putting L foot fully on floor before standing. Pt ambulated x150 ft in humphries holding IV pole with R hand and CGA. Pt unsteady on feet, cues to attend to L side, pt bumping into object on L x3. Stand > sit in recliner with CGA, pt using safe technique. Pt left seated, RN notified.   Barriers to return to prior living situation: medical status, balance, L side weakness/inattention, increased A for safe functional mobility and ADLs.   Recommendations for discharge: ARU  Rationale for recommendations: Pt is below PLOF and will benefit from multidisciplinary therapies to progress ADL IND and safety. Pt will be able to tolerate 3 hrs therapy/day. Pt has supportive family for ultimate discharge home.        Entered by: Stephanie Carias 09/26/2020 8:55 AM       
6A PT  Discharge Planner PT   Patient plan for discharge: ARC  Current status: Pt ambulates [id number] with Darya and intermittent IV pole support. Gait mechanics improve without IV pole support though still requires Darya 2/2 intermittent LOB and inattention to L side resulting in frequent collisions with stationary objects on L side. Darya required for stair navigation and step ups for strengthening and balance.   Barriers to return to prior living situation: Medical status, impaired balance, fall risk  Recommendations for discharge: ARC  Rationale for recommendations: Pt will benefit from intensive skilled rehab services to maximize balance, safety, and independence with functional mobility. Pt highly motivated to return to PLOF and has good support of family. Pt will tolerate 3hrs of therapy per day.       Entered by: Tara Clark 09/27/2020 9:14 AM       
[id number]  Status: Patient admitted on 9/23, now POD#3 s/p right frontal tumor resection  LABS: Lactic acid 2.4 at 0000, MD Berger notified, additional 500mL NS bolus ordered, re-draw at 0600 with lactic acid of 1.4  Vitals: VSS on 15L oxygen facemask for pneumocephalus  Neuros: A&Ox4, slow to respond, R periorbital swelling, PERRLA, Left sided weakness 3/4, Left pronator drift, right side 4/5, forgetful, flat affect  IV: 2 PIV's, one infusing plasmalyte at 145mL, additional IV TKO between antibiotics  Resp/trach: Lung sounds diminished in lower lobes  Diet: Regular  Bowel status: No BM, BS+, passing flatus, Senna given last evening  : Voiding spontaneously with frequency, condom catheter placed for urine incontinence  Skin: R crani incision with sutures, ANDREW, CDI  Pain: Denies  Activity: Up with A2, patient repositions self independently   Social: Calm and cooperative with cares, slept between cares overnight  Plan: Discharge to ARU when medically stable, continue to monitor and follow POC  
[id number]  Status: Pt POD#4 s/p R frontal tumor resection, hx of GBM  Vitals: HTN within parameters  Neuros: Alert & oriented x4, flat affect, speech slow, L drift, L 3/5, R 4/5, R hand with slight resting tremor-MDs aware   IV: PIV SL  Resp/trach: Denies SOB, per pt and wife no need for the nonrebreather,on list for MD clarification   Diet: Regular diet, gemma counts continued   Bowel status: BS+, BM 9/27  : Voiding without difficulty   Skin: Head incision, ANDREW no drainage noted   Pain: Denies  Activity: Up SBA/GB, up ad nai with wife   Social: Wife at bedside, involved in cares  Plan: Continue to monitor and follow POC     
AVSS, head incision dry and intact, long acting oxy sufficient for pain. Tolerating regular diet and activity out of bed very well. Up in bedside chair all day, steady gait to bathroom, voiding good amounts, standby assist.Seen by team, can go to rehab any time bed available. Seen by care coordinator, working on placement near [place] near home.  
D/I: Patient on unit 4A Surgical/Neuro ICU   Neuro- A and O x4, baseline weakness left side. Have to remind pt. To response with left extremities, pt. Slow to response, pupils equal and reactive.   CV- sinus tachycardia into the 130's overnight, responded well to fluid bolus, Systolic goal less than 140, hydralazine x 2 doses given. Afebrile.   Pulm- Room air  GI- Nausea and vomiting throughout the night, unable to alleviate with medications  - Feldman, output of 250-350/hr  Gtts-    Skin- Head incision  Pain- Headache - scheduled tylenol and home dose oxycontin given  Lines and drains- PIV x 2 LUE, PIV right hand, R radial A line.  See flow sheets for further interventions and assessments.   A: Stable   P: Continue to monitor pt closely. Notify MD of significant changes   
D/I: Pt slightly drowsy but oriented x4. Follows commands. Slower for left side commands. Left side weakness. On 15L non rebreather for pneumocephalus. Vital signs stable (see flow sheets). Compazine x1 this am for nausea. Tolerated regular diet. Good UO ( see I&O flow sheets). Incision C/D/I.   A: Pt stable post op.   P: Continue to monitor neuro status. Update MD with concerns.   
Discharge Planner OT   4A     Patient plan for discharge: agreeable to rehab  Current status: Pt motivated to get up for breakfast.  Cues to incourage increased use of L UE and LE during rolling and repositioning in chair.  Rec OH lift for transfers  Precautions crani  Barriers to return to prior living situation: R eye swollen shut, pain, precautions,   Recommendations for discharge: ARU  Rationale for recommendations: Pt presents with new deficits including new precautions, pain, L weakness/inattention, posture deficit leading to decreased function and safety. Pt unable to safely perform bed mobility or functional transfers along with standing ADLs. Needs to achieve  Mod IND with walker and  stairs to return home. Pt will benefit from intensive, interdisciplinary ARU to address these deficits and to provide caregiver training to facilitate a safe dc to home.  Anticipate at discharge pt can and will be able to tolerate 3 hours of therapy.        Entered by: Flory Merlos 09/25/2020 9:20 AM       
Discharge Planner OT   Patient plan for discharge: TCU  Current status: pt max assist pivot to chair and max assist bed mobility. Pt with poor proprioception in L UE/LE, able to attend to L with max prompts. Pt   Barriers to return to prior living situation: post surgical precautions, hemiparesis, weakness, cognitive deficits.   Recommendations for discharge: TCU/ARU  Rationale for recommendations: pending progress this hospital stay pt would likely benefit from ARU. Pt was I at baseline with most ADL.        Entered by: Grant Balderrama 09/24/2020 11:50 AM       
Discharge Planner PT   Patient plan for discharge: ARU  Current status: Ambulates 400ft and [id number] with CGA-min Ax1 2/2 lateral LOB especially when turning. Demo adequate speed and step length, L neglect causing occasional running into doorways and objects in halls.  Ascends and descends with min Ax1 and single railing.  LOB x2 upon descent needing min A to correct.   Barriers to return to prior living situation: medical status, current mobility, level of A, falls risk, L sided deficits (some baseline though at baseline pt able to ambulate blocks safely without supervision/assistance), RJ home/within home  Recommendations for discharge: ARU  Rationale for recommendations: Pt would greatly benefit from intensive rehab to progress functional mobility towards IND PLOF, good home support, and motivated. Anticipate pt will be able to tolerate 3 hours of therapy/day at time of discharge.        Entered by: Dominick Ward 09/26/2020 1:04 PM     Wife safe to amb with pt on unit and in room.   
Discharge Planner PT   Patient plan for discharge: ARU  Current status: Ambulates total of >1000ft with CGA-min Ax1 2/2 occasional lateral LOB especially when turning. Demo slow speed and short step length with L>R, L neglect causing occasional running into doorways and objects in halls.  LLE targeting ex performed with wife SAAD.   Barriers to return to prior living situation:  medical status, current mobility, level of A, falls risk, L sided deficits (some baseline though at baseline pt able to ambulate blocks safely without supervision/assistance), RJ home/within home  Recommendations for discharge: ARU  Rationale for recommendations: Pt would greatly benefit from intensive rehab to progress functional mobility towards IND PLOF, good home support, and motivated. Anticipate pt will be able to tolerate 3 hours of therapy/day at time of discharge.        Entered by: Dominick Ward 09/28/2020 11:50 AM     Wife safe to amb with pt on unit and in room.     See balance assessment score (DGI) From 9/26. Falls risk  
Discharge Planner PT   Patient plan for discharge: ARU  Current status: Functional balance ex performed in order to increase functional IND with balance. Needing min A frequently to correct LOB. Ambulates 200ft x2 with CGA-min Ax1 2/2 occasional lateral LOB especially when turning. Demo slow speed and short step length with L>R, L neglect causing frequent running into doorways and objects in halls.  Difficulty following some multi step directions. Increase collisions on L side in halls. Very fatigued today affecting performance.  Barriers to return to prior living situation:  medical status, current mobility, level of A, falls risk, L sided deficits (some baseline though at baseline pt able to ambulate blocks safely without supervision/assistance), RJ home/within home  Recommendations for discharge: ARU  Rationale for recommendations: Pt would greatly benefit from intensive rehab to progress functional mobility towards IND PLOF, good home support, and motivated. Anticipate pt will be able to tolerate 3 hours of therapy/day at time of discharge.          Wife safe to amb with pt on unit and in room.      See balance assessment score (DGI) From 9/26. Falls risk       Entered by: Dominick Ward 09/29/2020 11:53 AM     Physical Therapy Discharge Summary    Reason for therapy discharge:    Discharged to acute rehabilitation facility.    Progress towards therapy goal(s). See goals on Care Plan in [facility] electronic health record for goal details.  Goals met  Goals partially met.  Barriers to achieving goals:   discharge from facility.    Therapy recommendation(s):    Continued therapy is recommended.  Rationale/Recommendations:  ARU.      
Major Shift Events: Pt alert and oriented, drowsy late evening. C/o of h/a that he says is being managed well with scheduled tylenol. Left side UE and LE remains weaker then right with delayed response to commands. Left head incision CDI/swollen. Otherwise neuro check remains the same. BPs elevated overnight; prn hydralazine administered with no effect. Prn labetalol given per parameters with effect. Mildly tachy with BP elevation. Continues with 15L of O2 via non-re breathable mask for pneumocephalus management. Feldman intact and pt UO increased to 700-750 q2h; sodium check remains wnl/Neurosurg updated. Advised to monitor UO at this time, but no changes to poc made. Plasmalyte continues with 125 ml/hr. Potassium replaced; phos to be replaced upon delivery to unit.  Plan: Continue to monitor UO, labs, and neuro status. Continue with poc.  For vital signs and complete assessments, please see documentation flowsheets.    
Major Shift Events: Removed A line and weiss per orders. Up with therapy, ambulated in hallway with assist of 1. L side weakness improving compared to start of shift. Transferred to unit 6A, bedside handoff done with 6A RN. All belongings sent with patient  Plan: Continued work with PT/OT, close monitoring of neuro status  For vital signs and complete assessments, please see documentation flowsheets.    
Occupational Therapy Discharge Summary    Reason for therapy discharge:    Discharged to acute rehabilitation facility.    Progress towards therapy goal(s). See goals on Care Plan in [facility] electronic health record for goal details.  Goals partially met.  Barriers to achieving goals:   discharge from facility.    Therapy recommendation(s):    Continued therapy is recommended.  Rationale/Recommendations:  To progress safety and IND with ADLs and functional mobility .      
Status: On 6a for frontal resection of glioblastoma. POD #4 R side craniotomy.   Vitals: VSS. On 15L nonrebreather for pneumocephalus.   Neuros: Flat affect. A&Ox4. Denies N/T. L side 3/5. R ride 4/5. Slow, logical speech. Slight L side drift. Symmetrical facial expressions.   IV: PIV infusing plasmalyte at 145ml/hr. Other PIV TKO b/t vancomycin.   Resp/trach: 15L nonrebreather for pneumocephalus. Takes off frequently. Complies when staff puts mask back on. Needs reinforcement.    Diet: Regular. Calorie count began 9/26. Put receipts in envelope on door outside room.   Bowel status: No BM this shift.   : Voids freq with dribbling. Condom cath on with brief per pt request for better sleep. Condom cath off during day. Wife helps with urinal at bedside. 3350 ml output for my 12 hour shift.   Skin: Scalp incision ANDREW. Mild erythema. Slight edema around sutures and R periorbital area.   Pain: Headache/incisional pain. Controlled with oxycontin Q12.   Activity: Up ad nai with wife. Alarms on when wife leaves.   Social: Wife supportive and visits daily. Very supportive.   Plan: Continue to monitor and follow POC.     
Status: On 6a for frontal resection of glioblastoma. POD #5 R side craniotomy.   Vitals: VSS. On 15L nonrebreather for pneumocephalus.   Neuros: Flat affect. A&Ox4. Denies N/T. L side 3/5. R ride 4/5. Slow, logical speech. Slight L side drift. Symmetrical facial expressions. R hand slight resting tremor.   IV: PIV infusing plasmalyte at 145ml/hr. Paged providers to discontinue as pt has good PO intake.  Other PIV TKO b/t vancomycin.   Resp/trach: RA. No more O2 for pneumocephalus.   Diet: Regular. Calorie count continued. Put receipts in envelope on door outside room.   Bowel status: No BM this shift.   : Voids frequently in bathroom.   Skin: Scalp incision ANDREW. Mild erythema. Slight edema around sutures and R periorbital area.   Labs: Potassium replaced. Redraw in am.   Pain: Headache/incisional pain. Controlled with oxycontin Q12.   Activity: Up ad nai with wife. Alarms on when wife leaves.   Social: Wife supportive and visits daily. Very supportive.   Plan: Continue to monitor and follow POC. At discharge, likely will be to ARU.   
Status: On 6a for frontal resection of glioblastoma. POD #6 R side craniotomy.   Vitals: VSS. On 15L nonrebreather for pneumocephalus.   Neuros: Flat affect. A&Ox4. Denies N/T. L side 3/5. R ride 4/5. Slow, logical speech. Slight L side drift. Symmetrical facial expressions. R hand slight resting tremor.   IV: PIV SL  Resp/trach: RA  Diet: Regular. Calorie count continued.   Bowel status: No BM this shift.   : Voids frequently in bathroom.   Skin: Scalp incision ANDREW. Mild erythema. Slight edema around sutures and R periorbital area.   Pain: Controlled with oxycontin Q12.   Activity: Up ad nai with wife. Alarms on when wife leaves. SBA GB  Social: Wife supportive and visits daily. Very supportive.   Plan: Continue to monitor and follow POC. Looking for ARU placement near [place].   
Status: POD #5 s/p R frontal tumor resection  Vitals: VSS on RA  Neuros: A&O x4, flat affect, LUE 3/4, rest are 4/5, denied N/T, PERRLA  IV: PIV is SL in between meds  Resp/trach: Lung sounds are clear  Diet: Reg, tolerating  Bowel status: Bowel sounds are active, no BM this shift  : Voiding spontaneously, incontinent at times  Skin: Head incision is WDL, no drainage or signs of infection  Pain: Denied   Activity: SBA w/ GB  Social: Wife was present at the beginning of the shift and was very supportive in his cares  Plan: Will continue to monitor and follow POC, awaiting rehab placement    
Status: Pt on 6a s/p R frontal tumor resection, now POD 3.   Vitals: /84 (BP Location: Right arm)   Pulse 100   Temp 98  F (36.7  C) (Oral)   Resp 16   Ht 1.829 m (6')   Wt 104 kg (229 lb 4.5 oz)   SpO2 97%   BMI 31.10 kg/m    Neuros: Affect flat. Oriented x4. Speech slow but logical. L drift. L side 3, R side 4. R hand with slight resting tremor- pt states this was happening prior to surgery as well.   IV: PIV infusing plasmalyte at 145- MD contacted regarding d/c'ing fluids.   Resp/trach: On 15 LPM by nonrebreather for pneumocephalus- pt removes mask intermittently without telling anyone. Education provided. Continue to reinforce.   Diet: Reg diet, gemma counts continued. All receipts marked and placed in envelope outside room.   Bowel status: BM x1 today.  : Voids frequently with some dribbling. Used urinal and brief all day with assistance of wife. Put out large amounts of urine overnight - MD's aware.   Skin: Scalp incision ANDREW, well approximated. Smaller pin sites from positioning in surgery on scalp as well w/ scabs- one on L rear of scalp appears erythematous & had scant weeping of serosanguinous fluid.  Pain: Incisional pain controlled with scheduled oxycontin.  Activity: Up ad nai with wife. When wife is not present gets up SB/GB.  Social: wife at bedside. Very supportive. Would like SW update regarding discharge possibilities.  Plan: Continue to monitor and follow POC.   
Status: Pt on 6a s/p R frontal tumor resection, now POD 3.   Vitals: /88 (BP Location: Right arm)   Pulse 92   Temp 98.1  F (36.7  C) (Oral)   Resp 16   Ht 1.829 m (6')   Wt 104 kg (229 lb 4.5 oz)   SpO2 97%   BMI 31.10 kg/m    Neuros: Affect flat. Oriented x4. Speech slow but logical. L drift. L side 3, R side 4.   IV: PIV infusing plasmalyte at 145, other PIV TKO b/t IV abx.   Resp/trach: On 15 LPM by nonrebreather for pneumocephalus- pt removes mask intermittently without telling anyone. Education provided. Continue to reinforce.   Diet: Reg diet, gemma counts began today. All receipts marked and placed in envelope outside room.   Bowel status: No BM today.   : Voids frequently with some dribbling. Used urinal and brief all day with assistance of wife, would like to use condom cath again overnight for convenience.   Skin: Scalp incision ANDREW, well approximated.   Pain: Incisional pain controlled with scheduled oxycontin.   Activity: Up ad nai with wife (see PT note)- use alarm when wife not present.   Social: wife at bedside most of day. Very supportive.   Plan: Continue to monitor and follow POC.     
Status:9-23 R side craniotomy.   Vitals: 's-122/, MD's aware.   Neuros: Flat affect. A&Ox4. Denies N/T. L side 4/5. R side 5/5. Slow, logical speech. Slight L side drift.  Diet: Regular.   : Voids spont in bathroom.   Skin: Scalp incision ANDREW.   Pain: Controlled with oxycontin Q12.   Activity: Up ad nai with wife. Alarms on when wife leaves.  Plan: [facility]charge packet given to wife [facility] She took him down to car in wheelchair. Report was called  To [phone].   
Depression

## 2020-09-29 NOTE — PROGRESS NOTES
Social Work Services Discharge Note      Patient Name:  Gaetano Holley     Anticipated Discharge Date:  9/29/2020    Discharge Disposition:   [facility] ([phone])    Following MD:  Facility  Assignment     Pre-Admission Screening (PAS) online form has been completed.  The Level of Care (LOC) is:  Determined  Confirmation Code is:  Not required as pt is admitting to ARU setting.       Additional Services/Equipment Arranged:  Shalini Sampson NP has confirmed readiness for discharge. Admissions at [facility] ([facility]) has confirmed acceptance for admit and receipt of prior auth from insurance.   Pt's wife to provide pt's transport with an estimated departure time  Of 12:20pm.      Patient / Family response to discharge plan:  Pt and wife voice understanding of the discharge plan and agreement with the discharge plan.     Persons notified of above discharge plan:  Patient, wife, 6A nursing and Shalini Sampson NP    Staff Discharge Instructions:  Please fax discharge orders and signed hard scripts for any controlled substances (SW completed this task)..  Please print a packet and send with patient.     CTS Handoff completed:  YES    Medicare Notice of Rights provided to the patient/family:  NO, as per Admissions Facesheet, pt is not on Medicare.    JAZMYN Mcduffie  Social Work, 6A  Phone:  [phone]  Pager:  [phone]  9/29/2020

## 2020-09-29 NOTE — PROGRESS NOTES
Calorie Count  Intake recorded for: 9/28  Total Kcals: 1838 Total Protein: 91g  Kcals from Hospital Food: 1838  Protein: 91g  Kcals from Outside Food (average):0 Protein: 0g  # Meals Ordered from Kitchen: 3 meals   # Meals Recorded: 3 meals (First - 100% hash browns, scrambled eggs, sausage dilia, Greek yogurt)    (Second - 100% grilled cheese & soler sandwich, pudding, cantaloupe, 75% grapes, 50% squash)    (Third - 100% mashed potatoes w/ gravy, sugar free hot cocoa, whipped topping, pudding, 50% tortilla w/ ha, beef, soler, swiss cheese & fixings)  # Supplements Recorded: 0

## 2020-10-02 ENCOUNTER — VIRTUAL VISIT (OUTPATIENT)
Dept: NEUROSURGERY | Facility: CLINIC | Age: 50
End: 2020-10-02
Attending: NEUROLOGICAL SURGERY
Payer: COMMERCIAL

## 2020-10-02 ENCOUNTER — HOSPITAL ENCOUNTER (INPATIENT)
Facility: CLINIC | Age: 50
Setting detail: SURGERY ADMIT
End: 2020-10-02
Attending: NEUROLOGICAL SURGERY | Admitting: NEUROLOGICAL SURGERY
Payer: COMMERCIAL

## 2020-10-02 DIAGNOSIS — Z11.59 ENCOUNTER FOR SCREENING FOR OTHER VIRAL DISEASES: Primary | ICD-10-CM

## 2020-10-02 DIAGNOSIS — C71.9 GLIOBLASTOMA (H): Primary | ICD-10-CM

## 2020-10-02 PROCEDURE — 999N001193 HC VIDEO/TELEPHONE VISIT; NO CHARGE

## 2020-10-02 PROCEDURE — 99024 POSTOP FOLLOW-UP VISIT: CPT | Mod: 95 | Performed by: NEUROLOGICAL SURGERY

## 2020-10-02 NOTE — LETTER
"    10/2/2020         RE: Gaetano Holley  [address]  [place] [address]        Dear Colleague,    Thank you for referring your patient, Gaetano Holley, to the [facility]. Please see a copy of my visit note below.    Gaetano Holley is a 50 year old male who is being evaluated via a billable video visit.      The patient has been notified of following:     \"This video visit will be conducted via a call between you and your physician/provider. We have found that certain health care needs can be provided without the need for an in-person physical exam.  This service lets us provide the care you need with a video conversation.  If a prescription is necessary we can send it directly to your pharmacy.  If lab work is needed we can place an order for that and you can then stop by our lab to have the test done at a later time.    Video visits are billed at different rates depending on your insurance coverage.  Please reach out to your insurance provider with any questions.    If during the course of the call the physician/provider feels a video visit is not appropriate, you will not be charged for this service.\"    Patient has given verbal consent for Video visit? Yes  How would you like to obtain your AVS? MyChart  If you are dropped from the video visit, the video invite should be resent to: Text to cell phone: [phone]  Will anyone else be joining your video visit? No      Sherry DOVE    Video-Visit Details    Type of service:  Video Visit    Video Start Time: 3:10 PM  Video End Time: 3:16 PM    Originating Location (pt. Location): Home    Distant Location (provider location):  [facility]     Platform used for Video Visit: Kath Haq MD    Post-operative visit    50 M with recurrent glioblastoma s/p left frontal craniotomy for tumor resection. Due to extensive tumor involvement, left temporal lobectomy was performed.  There " were three foci of CE+ that remains after the temporal lobectomy. The patient opted to undergo laser thermal ablation of these lesions. The patient presents for a post-operative check as well as discussion of the laser thermal ablation.    No new complaints on review of systems.    Motor examination grossly non-focal. Incision dry and intact.     AP: I am very pleased with the outcome of this surgery and will proceed to schedule the laser thermal ablation. Rationale, risks, and benefits for the procedure were reviewed with the patient. Informed consent was verbally obtained.    Again, thank you for allowing me to participate in the care of your patient.        Sincerely,        Raul Haq MD

## 2020-10-02 NOTE — PROGRESS NOTES
"Gaetano Holley is a 50 year old male who is being evaluated via a billable video visit.      The patient has been notified of following:     \"This video visit will be conducted via a call between you and your physician/provider. We have found that certain health care needs can be provided without the need for an in-person physical exam.  This service lets us provide the care you need with a video conversation.  If a prescription is necessary we can send it directly to your pharmacy.  If lab work is needed we can place an order for that and you can then stop by our lab to have the test done at a later time.    Video visits are billed at different rates depending on your insurance coverage.  Please reach out to your insurance provider with any questions.    If during the course of the call the physician/provider feels a video visit is not appropriate, you will not be charged for this service.\"    Patient has given verbal consent for Video visit? Yes  How would you like to obtain your AVS? MyChart  If you are dropped from the video visit, the video invite should be resent to: Text to cell phone: [phone]  Will anyone else be joining your video visit? No      Sherry Metcalf SAAD    Video-Visit Details    Type of service:  Video Visit    Video Start Time: 3:10 PM  Video End Time: 3:16 PM    Originating Location (pt. Location): Home    Distant Location (provider location):  [facility]     Platform used for Video Visit: Kath Haq MD    Post-operative visit    50 M with recurrent glioblastoma s/p left frontal craniotomy for tumor resection. Due to extensive tumor involvement, left temporal lobectomy was performed.  There were three foci of CE+ that remains after the temporal lobectomy. The patient opted to undergo laser thermal ablation of these lesions. The patient presents for a post-operative check as well as discussion of the laser thermal ablation.    No new complaints " on review of systems.    Motor examination grossly non-focal. Incision dry and intact.     AP: I am very pleased with the outcome of this surgery and will proceed to schedule the laser thermal ablation. Rationale, risks, and benefits for the procedure were reviewed with the patient. Informed consent was verbally obtained.

## 2020-10-05 ENCOUNTER — DOCUMENTATION ONLY (OUTPATIENT)
Dept: OTHER | Facility: CLINIC | Age: 50
End: 2020-10-05

## 2020-10-05 NOTE — TELEPHONE ENCOUNTER
FUTURE VISIT INFORMATION      SURGERY INFORMATION:    Date: 10/21/20    Location: [facility] or    Surgeon:  Raul Haq MD    Anesthesia Type:  general    Procedure: Intraoperative Magnetic resonance imaging/stealth assisted Right  LASER ABLATION    Consult: virtual visit 10/2    RECORDS REQUESTED FROM:       Primary Care Provider: [facility]- [facility]    Most recent EKG+ Tracin20    Most recent ECHO: 19- Derek

## 2020-10-07 ENCOUNTER — HOSPITAL ENCOUNTER (INPATIENT)
Facility: CLINIC | Age: 50
LOS: 12 days | Discharge: HOME | DRG: 031 | End: 2020-10-19
Attending: EMERGENCY MEDICINE | Admitting: NEUROLOGICAL SURGERY
Payer: COMMERCIAL

## 2020-10-07 ENCOUNTER — APPOINTMENT (OUTPATIENT)
Dept: CT IMAGING | Facility: CLINIC | Age: 50
DRG: 031 | End: 2020-10-07
Attending: EMERGENCY MEDICINE
Payer: COMMERCIAL

## 2020-10-07 DIAGNOSIS — C71.9 GLIOBLASTOMA (H): ICD-10-CM

## 2020-10-07 DIAGNOSIS — G96.00 CSF LEAK: ICD-10-CM

## 2020-10-07 LAB
ALBUMIN SERPL-MCNC: 3.7 G/DL (ref 3.4–5)
ALP SERPL-CCNC: 55 U/L (ref 40–150)
ALT SERPL W P-5'-P-CCNC: 78 U/L (ref 0–70)
ANION GAP SERPL CALCULATED.3IONS-SCNC: 4 MMOL/L (ref 3–14)
AST SERPL W P-5'-P-CCNC: 17 U/L (ref 0–45)
BASOPHILS # BLD AUTO: 0 10E9/L (ref 0–0.2)
BASOPHILS NFR BLD AUTO: 0.3 %
BILIRUB SERPL-MCNC: 0.4 MG/DL (ref 0.2–1.3)
BUN SERPL-MCNC: 28 MG/DL (ref 7–30)
CALCIUM SERPL-MCNC: 9.4 MG/DL (ref 8.5–10.1)
CHLORIDE SERPL-SCNC: 101 MMOL/L (ref 94–109)
CO2 SERPL-SCNC: 30 MMOL/L (ref 20–32)
CREAT SERPL-MCNC: 0.88 MG/DL (ref 0.66–1.25)
CRP SERPL-MCNC: <2.9 MG/L (ref 0–8)
DIFFERENTIAL METHOD BLD: ABNORMAL
EOSINOPHIL # BLD AUTO: 0.1 10E9/L (ref 0–0.7)
EOSINOPHIL NFR BLD AUTO: 0.4 %
ERYTHROCYTE [DISTWIDTH] IN BLOOD BY AUTOMATED COUNT: 15 % (ref 10–15)
ERYTHROCYTE [SEDIMENTATION RATE] IN BLOOD BY WESTERGREN METHOD: 10 MM/H (ref 0–15)
GFR SERPL CREATININE-BSD FRML MDRD: >90 ML/MIN/{1.73_M2}
GLUCOSE SERPL-MCNC: 101 MG/DL (ref 70–99)
HCT VFR BLD AUTO: 40 % (ref 40–53)
HGB BLD-MCNC: 13.2 G/DL (ref 13.3–17.7)
IMM GRANULOCYTES # BLD: 0.3 10E9/L (ref 0–0.4)
IMM GRANULOCYTES NFR BLD: 2.3 %
INR PPP: 0.93 (ref 0.86–1.14)
LABORATORY COMMENT REPORT: NORMAL
LYMPHOCYTES # BLD AUTO: 1.9 10E9/L (ref 0.8–5.3)
LYMPHOCYTES NFR BLD AUTO: 16.2 %
MCH RBC QN AUTO: 31.2 PG (ref 26.5–33)
MCHC RBC AUTO-ENTMCNC: 33 G/DL (ref 31.5–36.5)
MCV RBC AUTO: 95 FL (ref 78–100)
MONOCYTES # BLD AUTO: 0.8 10E9/L (ref 0–1.3)
MONOCYTES NFR BLD AUTO: 6.8 %
NEUTROPHILS # BLD AUTO: 8.6 10E9/L (ref 1.6–8.3)
NEUTROPHILS NFR BLD AUTO: 74 %
NRBC # BLD AUTO: 0 10*3/UL
NRBC BLD AUTO-RTO: 0 /100
PLATELET # BLD AUTO: 266 10E9/L (ref 150–450)
POTASSIUM SERPL-SCNC: 3.9 MMOL/L (ref 3.4–5.3)
PROT SERPL-MCNC: 7.2 G/DL (ref 6.8–8.8)
RADIOLOGIST FLAGS: ABNORMAL
RBC # BLD AUTO: 4.23 10E12/L (ref 4.4–5.9)
SARS-COV-2 RNA SPEC QL NAA+PROBE: NEGATIVE
SARS-COV-2 RNA SPEC QL NAA+PROBE: NORMAL
SODIUM SERPL-SCNC: 135 MMOL/L (ref 133–144)
SPECIMEN SOURCE: NORMAL
SPECIMEN SOURCE: NORMAL
WBC # BLD AUTO: 11.6 10E9/L (ref 4–11)

## 2020-10-07 PROCEDURE — 250N000011 HC RX IP 250 OP 636: Performed by: STUDENT IN AN ORGANIZED HEALTH CARE EDUCATION/TRAINING PROGRAM

## 2020-10-07 PROCEDURE — 120N000002 HC R&B MED SURG/OB UMMC

## 2020-10-07 PROCEDURE — 85610 PROTHROMBIN TIME: CPT | Performed by: EMERGENCY MEDICINE

## 2020-10-07 PROCEDURE — 250N000013 HC RX MED GY IP 250 OP 250 PS 637: Performed by: STUDENT IN AN ORGANIZED HEALTH CARE EDUCATION/TRAINING PROGRAM

## 2020-10-07 PROCEDURE — 999N000128 HC STATISTIC PERIPHERAL IV START W/O US GUIDANCE

## 2020-10-07 PROCEDURE — 86140 C-REACTIVE PROTEIN: CPT | Performed by: EMERGENCY MEDICINE

## 2020-10-07 PROCEDURE — C9803 HOPD COVID-19 SPEC COLLECT: HCPCS | Performed by: EMERGENCY MEDICINE

## 2020-10-07 PROCEDURE — U0003 INFECTIOUS AGENT DETECTION BY NUCLEIC ACID (DNA OR RNA); SEVERE ACUTE RESPIRATORY SYNDROME CORONAVIRUS 2 (SARS-COV-2) (CORONAVIRUS DISEASE [COVID-19]), AMPLIFIED PROBE TECHNIQUE, MAKING USE OF HIGH THROUGHPUT TECHNOLOGIES AS DESCRIBED BY CMS-2020-01-R: HCPCS | Performed by: EMERGENCY MEDICINE

## 2020-10-07 PROCEDURE — 99284 EMERGENCY DEPT VISIT MOD MDM: CPT | Performed by: EMERGENCY MEDICINE

## 2020-10-07 PROCEDURE — 99285 EMERGENCY DEPT VISIT HI MDM: CPT | Mod: 25 | Performed by: EMERGENCY MEDICINE

## 2020-10-07 PROCEDURE — 70450 CT HEAD/BRAIN W/O DYE: CPT

## 2020-10-07 PROCEDURE — 87040 BLOOD CULTURE FOR BACTERIA: CPT | Performed by: EMERGENCY MEDICINE

## 2020-10-07 PROCEDURE — 80053 COMPREHEN METABOLIC PANEL: CPT | Performed by: EMERGENCY MEDICINE

## 2020-10-07 PROCEDURE — 70450 CT HEAD/BRAIN W/O DYE: CPT | Mod: 26 | Performed by: STUDENT IN AN ORGANIZED HEALTH CARE EDUCATION/TRAINING PROGRAM

## 2020-10-07 PROCEDURE — 85025 COMPLETE CBC W/AUTO DIFF WBC: CPT | Performed by: EMERGENCY MEDICINE

## 2020-10-07 PROCEDURE — 85652 RBC SED RATE AUTOMATED: CPT | Performed by: EMERGENCY MEDICINE

## 2020-10-07 RX ORDER — HYDROMORPHONE HYDROCHLORIDE 1 MG/ML
0.5 INJECTION, SOLUTION INTRAMUSCULAR; INTRAVENOUS; SUBCUTANEOUS
Status: DISCONTINUED | OUTPATIENT
Start: 2020-10-07 | End: 2020-10-09

## 2020-10-07 RX ORDER — LEVETIRACETAM 750 MG/1
750 TABLET ORAL 2 TIMES DAILY
Status: DISCONTINUED | OUTPATIENT
Start: 2020-10-07 | End: 2020-10-08

## 2020-10-07 RX ORDER — OXYCODONE HYDROCHLORIDE 5 MG/1
5-10 TABLET ORAL
Status: DISCONTINUED | OUTPATIENT
Start: 2020-10-07 | End: 2020-10-19 | Stop reason: HOSPADM

## 2020-10-07 RX ORDER — POLYETHYLENE GLYCOL 3350 17 G/17G
17 POWDER, FOR SOLUTION ORAL DAILY
Status: DISCONTINUED | OUTPATIENT
Start: 2020-10-08 | End: 2020-10-19 | Stop reason: HOSPADM

## 2020-10-07 RX ORDER — LISINOPRIL 10 MG/1
10 TABLET ORAL DAILY
Status: DISCONTINUED | OUTPATIENT
Start: 2020-10-08 | End: 2020-10-19 | Stop reason: HOSPADM

## 2020-10-07 RX ORDER — ONDANSETRON 4 MG/1
8 TABLET, FILM COATED ORAL DAILY
Status: DISCONTINUED | OUTPATIENT
Start: 2020-10-08 | End: 2020-10-08

## 2020-10-07 RX ORDER — NALOXONE HYDROCHLORIDE 0.4 MG/ML
.1-.4 INJECTION, SOLUTION INTRAMUSCULAR; INTRAVENOUS; SUBCUTANEOUS
Status: DISCONTINUED | OUTPATIENT
Start: 2020-10-07 | End: 2020-10-19 | Stop reason: HOSPADM

## 2020-10-07 RX ORDER — METHYLPHENIDATE HYDROCHLORIDE 5 MG/1
5 TABLET ORAL 2 TIMES DAILY
Status: DISCONTINUED | OUTPATIENT
Start: 2020-10-07 | End: 2020-10-08

## 2020-10-07 RX ORDER — PROCHLORPERAZINE MALEATE 10 MG
10 TABLET ORAL EVERY 6 HOURS PRN
Status: DISCONTINUED | OUTPATIENT
Start: 2020-10-07 | End: 2020-10-19 | Stop reason: HOSPADM

## 2020-10-07 RX ORDER — ESCITALOPRAM OXALATE 10 MG/1
10 TABLET ORAL DAILY
Status: DISCONTINUED | OUTPATIENT
Start: 2020-10-08 | End: 2020-10-19 | Stop reason: HOSPADM

## 2020-10-07 RX ORDER — FLUTICASONE PROPIONATE 50 MCG
50 SPRAY, SUSPENSION (ML) NASAL DAILY
Status: DISCONTINUED | OUTPATIENT
Start: 2020-10-08 | End: 2020-10-19 | Stop reason: HOSPADM

## 2020-10-07 RX ORDER — DIAZEPAM 5 MG
5 TABLET ORAL EVERY 6 HOURS PRN
Status: DISCONTINUED | OUTPATIENT
Start: 2020-10-07 | End: 2020-10-19 | Stop reason: HOSPADM

## 2020-10-07 RX ORDER — AMOXICILLIN 250 MG
1 CAPSULE ORAL 2 TIMES DAILY
Status: DISCONTINUED | OUTPATIENT
Start: 2020-10-07 | End: 2020-10-19 | Stop reason: HOSPADM

## 2020-10-07 RX ADMIN — LEVETIRACETAM 750 MG: 750 TABLET, FILM COATED ORAL at 23:53

## 2020-10-07 RX ADMIN — HYDROMORPHONE HYDROCHLORIDE 0.5 MG: 1 INJECTION, SOLUTION INTRAMUSCULAR; INTRAVENOUS; SUBCUTANEOUS at 23:47

## 2020-10-07 RX ADMIN — DIAZEPAM 5 MG: 5 TABLET ORAL at 23:39

## 2020-10-07 ASSESSMENT — ENCOUNTER SYMPTOMS
NAUSEA: 0
CHEST TIGHTNESS: 0
COUGH: 0
SHORTNESS OF BREATH: 0
NECK STIFFNESS: 0
ABDOMINAL PAIN: 0
WEAKNESS: 0
FEVER: 0
EYE PAIN: 1
COLOR CHANGE: 0
NECK PAIN: 0
NUMBNESS: 0
CHILLS: 0
VOMITING: 0
HEADACHES: 1

## 2020-10-07 ASSESSMENT — MIFFLIN-ST. JEOR: SCORE: 1996.63

## 2020-10-07 NOTE — ED TRIAGE NOTES
Pt. presents to ED from home with complaints of leaking CSF from R sided skull incision. Was instructed to come here immediately from home by Neurosurg team. AVSS on RA. Pt. A & O x 4, independent, no other complaints.

## 2020-10-08 LAB
ALBUMIN SERPL-MCNC: 3.3 G/DL (ref 3.4–5)
ALP SERPL-CCNC: 49 U/L (ref 40–150)
ALT SERPL W P-5'-P-CCNC: 81 U/L (ref 0–70)
ANION GAP SERPL CALCULATED.3IONS-SCNC: 6 MMOL/L (ref 3–14)
APPEARANCE CSF: CLEAR
AST SERPL W P-5'-P-CCNC: 20 U/L (ref 0–45)
BASOPHILS NFR CSF MANUAL: 3 %
BILIRUB SERPL-MCNC: 0.5 MG/DL (ref 0.2–1.3)
BUN SERPL-MCNC: 28 MG/DL (ref 7–30)
CALCIUM SERPL-MCNC: 9.2 MG/DL (ref 8.5–10.1)
CHLORIDE SERPL-SCNC: 104 MMOL/L (ref 94–109)
CO2 SERPL-SCNC: 27 MMOL/L (ref 20–32)
COLOR CSF: YELLOW
CREAT SERPL-MCNC: 0.82 MG/DL (ref 0.66–1.25)
CRP SERPL-MCNC: <2.9 MG/L (ref 0–8)
ERYTHROCYTE [DISTWIDTH] IN BLOOD BY AUTOMATED COUNT: 14.8 % (ref 10–15)
GFR SERPL CREATININE-BSD FRML MDRD: >90 ML/MIN/{1.73_M2}
GLUCOSE CSF-MCNC: 67 MG/DL (ref 40–70)
GLUCOSE SERPL-MCNC: 138 MG/DL (ref 70–99)
GRAM STN SPEC: NORMAL
HCT VFR BLD AUTO: 37.4 % (ref 40–53)
HGB BLD-MCNC: 12.2 G/DL (ref 13.3–17.7)
LYMPH ABN NFR CSF MANUAL: 78 %
MCH RBC QN AUTO: 31 PG (ref 26.5–33)
MCHC RBC AUTO-ENTMCNC: 32.6 G/DL (ref 31.5–36.5)
MCV RBC AUTO: 95 FL (ref 78–100)
MONOS+MACROS NFR CSF MANUAL: 11 %
NEUTROPHILS NFR CSF MANUAL: 8 %
PLATELET # BLD AUTO: 234 10E9/L (ref 150–450)
POTASSIUM SERPL-SCNC: 3.6 MMOL/L (ref 3.4–5.3)
PROT CSF-MCNC: 143 MG/DL (ref 15–60)
PROT SERPL-MCNC: 6.5 G/DL (ref 6.8–8.8)
RBC # BLD AUTO: 3.94 10E12/L (ref 4.4–5.9)
RBC # CSF MANUAL: 845 /UL (ref 0–2)
SODIUM SERPL-SCNC: 136 MMOL/L (ref 133–144)
SPECIMEN SOURCE: NORMAL
TUBE # CSF: ABNORMAL #
WBC # BLD AUTO: 10.5 10E9/L (ref 4–11)
WBC # CSF MANUAL: 7 /UL (ref 0–5)

## 2020-10-08 PROCEDURE — 258N000003 HC RX IP 258 OP 636: Performed by: NEUROLOGICAL SURGERY

## 2020-10-08 PROCEDURE — 250N000013 HC RX MED GY IP 250 OP 250 PS 637: Performed by: STUDENT IN AN ORGANIZED HEALTH CARE EDUCATION/TRAINING PROGRAM

## 2020-10-08 PROCEDURE — 250N000009 HC RX 250

## 2020-10-08 PROCEDURE — 009U30Z DRAINAGE OF SPINAL CANAL WITH DRAINAGE DEVICE, PERCUTANEOUS APPROACH: ICD-10-PCS | Performed by: NEUROLOGICAL SURGERY

## 2020-10-08 PROCEDURE — 85027 COMPLETE CBC AUTOMATED: CPT | Performed by: STUDENT IN AN ORGANIZED HEALTH CARE EDUCATION/TRAINING PROGRAM

## 2020-10-08 PROCEDURE — 80053 COMPREHEN METABOLIC PANEL: CPT | Performed by: STUDENT IN AN ORGANIZED HEALTH CARE EDUCATION/TRAINING PROGRAM

## 2020-10-08 PROCEDURE — 87070 CULTURE OTHR SPECIMN AEROBIC: CPT | Performed by: STUDENT IN AN ORGANIZED HEALTH CARE EDUCATION/TRAINING PROGRAM

## 2020-10-08 PROCEDURE — 120N000002 HC R&B MED SURG/OB UMMC

## 2020-10-08 PROCEDURE — 250N000013 HC RX MED GY IP 250 OP 250 PS 637: Performed by: NURSE PRACTITIONER

## 2020-10-08 PROCEDURE — 87102 FUNGUS ISOLATION CULTURE: CPT | Performed by: STUDENT IN AN ORGANIZED HEALTH CARE EDUCATION/TRAINING PROGRAM

## 2020-10-08 PROCEDURE — 999N000127 HC STATISTIC PERIPHERAL IV START W US GUIDANCE

## 2020-10-08 PROCEDURE — 87015 SPECIMEN INFECT AGNT CONCNTJ: CPT | Performed by: STUDENT IN AN ORGANIZED HEALTH CARE EDUCATION/TRAINING PROGRAM

## 2020-10-08 PROCEDURE — 82945 GLUCOSE OTHER FLUID: CPT | Performed by: STUDENT IN AN ORGANIZED HEALTH CARE EDUCATION/TRAINING PROGRAM

## 2020-10-08 PROCEDURE — 250N000011 HC RX IP 250 OP 636: Performed by: STUDENT IN AN ORGANIZED HEALTH CARE EDUCATION/TRAINING PROGRAM

## 2020-10-08 PROCEDURE — 36415 COLL VENOUS BLD VENIPUNCTURE: CPT | Performed by: STUDENT IN AN ORGANIZED HEALTH CARE EDUCATION/TRAINING PROGRAM

## 2020-10-08 PROCEDURE — 84157 ASSAY OF PROTEIN OTHER: CPT | Performed by: STUDENT IN AN ORGANIZED HEALTH CARE EDUCATION/TRAINING PROGRAM

## 2020-10-08 PROCEDURE — 86140 C-REACTIVE PROTEIN: CPT | Performed by: STUDENT IN AN ORGANIZED HEALTH CARE EDUCATION/TRAINING PROGRAM

## 2020-10-08 PROCEDURE — 250N000011 HC RX IP 250 OP 636: Performed by: NEUROLOGICAL SURGERY

## 2020-10-08 PROCEDURE — 87075 CULTR BACTERIA EXCEPT BLOOD: CPT | Performed by: STUDENT IN AN ORGANIZED HEALTH CARE EDUCATION/TRAINING PROGRAM

## 2020-10-08 PROCEDURE — 89051 BODY FLUID CELL COUNT: CPT | Performed by: STUDENT IN AN ORGANIZED HEALTH CARE EDUCATION/TRAINING PROGRAM

## 2020-10-08 PROCEDURE — 87205 SMEAR GRAM STAIN: CPT | Performed by: STUDENT IN AN ORGANIZED HEALTH CARE EDUCATION/TRAINING PROGRAM

## 2020-10-08 RX ORDER — METHYLPHENIDATE HYDROCHLORIDE 5 MG/1
5 TABLET ORAL 2 TIMES DAILY
Status: DISCONTINUED | OUTPATIENT
Start: 2020-10-09 | End: 2020-10-19 | Stop reason: HOSPADM

## 2020-10-08 RX ORDER — DEXAMETHASONE SODIUM PHOSPHATE 4 MG/ML
2 INJECTION, SOLUTION INTRA-ARTICULAR; INTRALESIONAL; INTRAMUSCULAR; INTRAVENOUS; SOFT TISSUE EVERY MORNING
Status: DISCONTINUED | OUTPATIENT
Start: 2020-10-09 | End: 2020-10-15

## 2020-10-08 RX ORDER — LEVETIRACETAM 750 MG/1
1500 TABLET ORAL 2 TIMES DAILY
Status: DISCONTINUED | OUTPATIENT
Start: 2020-10-08 | End: 2020-10-19 | Stop reason: HOSPADM

## 2020-10-08 RX ORDER — ONDANSETRON 2 MG/ML
4 INJECTION INTRAMUSCULAR; INTRAVENOUS EVERY 6 HOURS PRN
Status: DISCONTINUED | OUTPATIENT
Start: 2020-10-08 | End: 2020-10-19 | Stop reason: HOSPADM

## 2020-10-08 RX ORDER — PROCHLORPERAZINE MALEATE 10 MG
10 TABLET ORAL EVERY 6 HOURS PRN
Status: DISCONTINUED | OUTPATIENT
Start: 2020-10-08 | End: 2020-10-19 | Stop reason: HOSPADM

## 2020-10-08 RX ORDER — ONDANSETRON 4 MG/1
4 TABLET, ORALLY DISINTEGRATING ORAL EVERY 6 HOURS PRN
Status: DISCONTINUED | OUTPATIENT
Start: 2020-10-08 | End: 2020-10-19 | Stop reason: HOSPADM

## 2020-10-08 RX ORDER — PROCHLORPERAZINE 25 MG
25 SUPPOSITORY, RECTAL RECTAL EVERY 12 HOURS PRN
Status: DISCONTINUED | OUTPATIENT
Start: 2020-10-08 | End: 2020-10-19 | Stop reason: HOSPADM

## 2020-10-08 RX ORDER — METOCLOPRAMIDE 5 MG/1
10 TABLET ORAL EVERY 6 HOURS PRN
Status: DISCONTINUED | OUTPATIENT
Start: 2020-10-08 | End: 2020-10-19 | Stop reason: HOSPADM

## 2020-10-08 RX ORDER — LIDOCAINE HYDROCHLORIDE 10 MG/ML
INJECTION, SOLUTION EPIDURAL; INFILTRATION; INTRACAUDAL; PERINEURAL
Status: COMPLETED
Start: 2020-10-08 | End: 2020-10-08

## 2020-10-08 RX ORDER — ONDANSETRON 4 MG/1
8 TABLET, FILM COATED ORAL EVERY 6 HOURS PRN
Status: DISCONTINUED | OUTPATIENT
Start: 2020-10-08 | End: 2020-10-08

## 2020-10-08 RX ORDER — METHOCARBAMOL 500 MG/1
500 TABLET, FILM COATED ORAL 4 TIMES DAILY
Status: DISCONTINUED | OUTPATIENT
Start: 2020-10-08 | End: 2020-10-09

## 2020-10-08 RX ORDER — METOCLOPRAMIDE HYDROCHLORIDE 5 MG/ML
10 INJECTION INTRAMUSCULAR; INTRAVENOUS EVERY 6 HOURS PRN
Status: DISCONTINUED | OUTPATIENT
Start: 2020-10-08 | End: 2020-10-19 | Stop reason: HOSPADM

## 2020-10-08 RX ADMIN — OMEPRAZOLE 20 MG: 20 CAPSULE, DELAYED RELEASE ORAL at 07:54

## 2020-10-08 RX ADMIN — LISINOPRIL 10 MG: 10 TABLET ORAL at 07:53

## 2020-10-08 RX ADMIN — ESCITALOPRAM 10 MG: 10 TABLET, FILM COATED ORAL at 07:53

## 2020-10-08 RX ADMIN — HYDROMORPHONE HYDROCHLORIDE 0.5 MG: 1 INJECTION, SOLUTION INTRAMUSCULAR; INTRAVENOUS; SUBCUTANEOUS at 00:22

## 2020-10-08 RX ADMIN — LEVETIRACETAM 1500 MG: 750 TABLET, FILM COATED ORAL at 20:36

## 2020-10-08 RX ADMIN — LEVETIRACETAM 750 MG: 750 TABLET, FILM COATED ORAL at 07:53

## 2020-10-08 RX ADMIN — OXYCODONE HYDROCHLORIDE 10 MG: 5 TABLET ORAL at 22:41

## 2020-10-08 RX ADMIN — METHYLPHENIDATE HYDROCHLORIDE 5 MG: 5 TABLET ORAL at 07:57

## 2020-10-08 RX ADMIN — VANCOMYCIN HYDROCHLORIDE 1750 MG: 10 INJECTION, POWDER, LYOPHILIZED, FOR SOLUTION INTRAVENOUS at 11:00

## 2020-10-08 RX ADMIN — LIDOCAINE HYDROCHLORIDE: 10 INJECTION, SOLUTION EPIDURAL; INFILTRATION; INTRACAUDAL; PERINEURAL at 00:54

## 2020-10-08 RX ADMIN — DIAZEPAM 5 MG: 5 TABLET ORAL at 17:35

## 2020-10-08 RX ADMIN — CEFEPIME 2 G: 2 INJECTION, POWDER, FOR SOLUTION INTRAVENOUS at 17:35

## 2020-10-08 RX ADMIN — OXYCODONE HYDROCHLORIDE 5 MG: 5 TABLET ORAL at 14:29

## 2020-10-08 RX ADMIN — DIAZEPAM 5 MG: 5 TABLET ORAL at 10:04

## 2020-10-08 RX ADMIN — OXYCODONE HYDROCHLORIDE 5 MG: 5 TABLET ORAL at 13:51

## 2020-10-08 RX ADMIN — ONDANSETRON HYDROCHLORIDE 8 MG: 4 TABLET, FILM COATED ORAL at 07:53

## 2020-10-08 RX ADMIN — VANCOMYCIN HYDROCHLORIDE 1750 MG: 10 INJECTION, POWDER, LYOPHILIZED, FOR SOLUTION INTRAVENOUS at 17:39

## 2020-10-08 RX ADMIN — CEFEPIME 2 G: 2 INJECTION, POWDER, FOR SOLUTION INTRAVENOUS at 09:28

## 2020-10-08 RX ADMIN — METRONIDAZOLE 500 MG: 500 INJECTION, SOLUTION INTRAVENOUS at 20:36

## 2020-10-08 RX ADMIN — METRONIDAZOLE 500 MG: 500 INJECTION, SOLUTION INTRAVENOUS at 02:12

## 2020-10-08 RX ADMIN — METRONIDAZOLE 500 MG: 500 INJECTION, SOLUTION INTRAVENOUS at 07:47

## 2020-10-08 RX ADMIN — HYDROMORPHONE HYDROCHLORIDE 0.5 MG: 1 INJECTION, SOLUTION INTRAMUSCULAR; INTRAVENOUS; SUBCUTANEOUS at 18:51

## 2020-10-08 RX ADMIN — CEFEPIME 2 G: 2 INJECTION, POWDER, FOR SOLUTION INTRAVENOUS at 01:05

## 2020-10-08 RX ADMIN — PROCHLORPERAZINE EDISYLATE 10 MG: 5 INJECTION INTRAMUSCULAR; INTRAVENOUS at 18:38

## 2020-10-08 RX ADMIN — METRONIDAZOLE 500 MG: 500 INJECTION, SOLUTION INTRAVENOUS at 14:29

## 2020-10-08 RX ADMIN — METHOCARBAMOL 500 MG: 500 TABLET, FILM COATED ORAL at 20:36

## 2020-10-08 RX ADMIN — VANCOMYCIN HYDROCHLORIDE 2750 MG: 1 INJECTION, POWDER, LYOPHILIZED, FOR SOLUTION INTRAVENOUS at 02:07

## 2020-10-08 ASSESSMENT — ACTIVITIES OF DAILY LIVING (ADL)
ADLS_ACUITY_SCORE: 16
ADLS_ACUITY_SCORE: 14
ADLS_ACUITY_SCORE: 15
ADLS_ACUITY_SCORE: 14
ADLS_ACUITY_SCORE: 15
ADLS_ACUITY_SCORE: 15

## 2020-10-08 NOTE — PLAN OF CARE
Status: Pt was admitted for CSF leak from head incision. Recent R craniotomy & surgical resection of recurrent GBM on 9/23.   Vitals: VSS, RA.   Neuros: D/o to time x1. Otherwise A&Ox4. Flat affect. 5/5 all extremities. Denies N/T. Denies vision issues.   IV: PIVs SL in between IV Abx.   LDA: Lumbar drain placed this shift. Titrate to 10-15 mL /hour. Clear, serous output. LD dressing reinforced. Small serosanguinous drainage to dressing. Marked, small extension noted at shift change.   Resp/trach: LS CTA.   Diet: Regular. Had courtesy meal overnight.   Bowel status: BS+, had BM over shift.   : Voiding w/o difficulty.   Skin: Head incisions ANDREW. Some erythema. Bruising noted on BUE.   Pain: Some tenderness at LD insertion site. Otherwise, pt denies pain.   Activity: Up w/ SBA, GB. BA on, bed locked. Pt needs reminders to call for assistance.  Plan: Continue to monitor & follow POC.

## 2020-10-08 NOTE — PLAN OF CARE
VSS. C/O headache and back pain from dressing, Valium and oxycodone given. Neuros intact except pt can be slow to respond and has flat affect. Right head incisoin with no drainage, ANDREW. Bruising to BUE. Goal is to titrate LD to 10-15 ml/hr (4-20 ml/hr this shift, hard with pt repositioning often). LD with old drainage on back, marked. Regular diet, good po. Voiding, had BM today. Up with SBA. Pt likes to reposition frequently from bed/chair. Alarm on at all times. Wife at bedside and helpful with cares, both were update by DR. Haq.

## 2020-10-08 NOTE — PROGRESS NOTES
Neurosurgery progress note    S: No acute events since LD placement, dressing appears to be dry    O:  Temp:  [96.5  F (35.8  C)-98.5  F (36.9  C)] 96.5  F (35.8  C)  Pulse:  [62-84] 62  Resp:  [16-22] 16  BP: (101-134)/(69-89) 108/77  SpO2:  [92 %-100 %] 99 %    Exam:  NEUROLOGIC:  -- Awake; Alert; oriented x 3  -- Follows commands briskly  -- +repetition, calculation, and naming  -- Speech fluent, spontaneous. No aphasia or dysarthria.  -- Mild L nasolabial flattening     Motor:  Normal bulk / tone; no tremor, rigidity, or bradykinesia.  No muscle wasting or fasciculations  No Pronator Drift  LUE and LLE 4/5 baseline per EMR    ASSESSMENT:  is a 50 year old man with a recent 9/23/2020 scheduled right craniotomy and surgical resection for ecurrent GBM with CSF leak from the incision. LD placed on 10/8 incision revised        RECOMMENDATIONS:  - LD placement, 10-15 ml/hr, possible 5 day of drainage  - Broad spectrm abx  - Follow up Blood, CSF culture     Mattie Zaragoza MD  Neurosurgery Resident     Please contact neurosurgery resident on call with questions.    Dial * * [phone], enter [phone] when prompted

## 2020-10-08 NOTE — PROGRESS NOTES
Arrived from:  ED   Belongings/meds:  Belongings arrived w/ pt. States no home medications brought.   2 RN Skin Assessment Completed by:  Lynnette PERES and Almita CALDWELL  Non-intact findings documented (yes/no/NA): Crani site w/ dressing. Otherwise intact.

## 2020-10-08 NOTE — ED NOTES
[facility]    ED Nurse to Floor Handoff     Gaetano Holley is a 50 year old male who speaks English and lives with family members,  in a home  They arrived in the ED by car from home    ED Chief Complaint: Post-op Problem    ED Dx;   Final diagnoses:   CSF leak         Needed?: No    Allergies:   Allergies   Allergen Reactions     Food Other (See Comments)     Pineapple and pineapple juice causes severe vomiting     Morphine      Sedated but the pain was not relieved       Penicillins Other (See Comments)     Childhood reaction, possible anaphylaxis     Pineapple Nausea and Vomiting   .  Past Medical Hx:   Past Medical History:   Diagnosis Date     Abdominal pain 2008    unspecified      Acute cholecystitis 07/01/2008     Depressive disorder 1998     Erectile dysfunction 2008     Hypertension      Impaired fasting glucose 12/12/2008     Iron deficiency anemia      Seizures (H)       Baseline Mental status: WDL  Current Mental Status changes: at basesline    Infection present or suspected this encounter: cultures pending  Sepsis suspected: No  Isolation type: No active isolations     Activity level - Baseline/Home:  Independent  Activity Level - Current:   Unknown    Bariatric equipment needed?: No    In the ED these meds were given: Medications - No data to display    Drips running?  No    Home pump  No    Current LDAs  Peripheral IV 09/24/20 Left;Lateral Lower forearm (Active)   Number of days: 13       Incision/Surgical Site 09/23/20 Right Head (Active)   Incision Assessment WDL except;Drainage 10/07/20 2040   Number of days: 14       Labs results: Labs Ordered and Resulted from Time of ED Arrival Up to the Time of Departure from the ED - No data to display    Imaging Studies: No results found for this or any previous visit (from the past 24 hour(s)).    Recent vital signs:   /69   Pulse 80   Temp 98.5  F (36.9  C) (Oral)   Resp 16   Ht 1.829 m  (6')   Wt 108.9 kg (240 lb)   SpO2 100%   BMI 32.55 kg/m      [place] Coma Scale Score: 15 (10/07/20 2040)       Cardiac Rhythm: Normal Sinus  Pt needs tele? No  Skin/wound Issues: Please see LDA's    Code Status: Not on file    Pain control: fair    Nausea control: pt had none    Abnormal labs/tests/findings requiring intervention: leaking CSF from surgical incision     Family present during ED course? Yes   Family Comments/Social Situation comments: Spouse at bedside    Tasks needing completion: None    Usman Watkins RN  [phone] [facility]

## 2020-10-08 NOTE — PHARMACY-VANCOMYCIN DOSING SERVICE
Vancomycin Pharmacy Empiric Dose Change Per Policy    Original Dose Ordered: Vancomycin 2250 mg IV every 12 hours  Dose Changed To: Vancomycin 1750 mg IV every 8 hours    This dose change was based on the pharmacist's assessment of this patient's age, weight, concurrent drug therapy, treatment goals, whether patient's creatinine clearance adequately indicates renal function (factoring in age, muscle mass, fluid and clinical status), and, if applicable, prior pharmacokinetic data.    Creatinine Clearance=     Estimated Creatinine Clearance: 140.4 mL/min (based on SCr of 0.82 mg/dL).  Will continue to follow and modify dosage according to levels, organ function and clinical condition      Marcell Martinez PharmD, BCPS  October 8, 2020

## 2020-10-08 NOTE — PROGRESS NOTES
Please place the ED to Floor transfer note. Thank you.    Shoshana Torres, BSN, RN, PHN, CNRN  Nurse Unit Lead 6A  [phone]

## 2020-10-08 NOTE — PROGRESS NOTES
ED can you please obtain Covid swab.     Shoshana Torres, BSN, RN, PHN, CNRN  Nurse Unit Lead 6A  [phone]

## 2020-10-08 NOTE — PROCEDURES
[facility] Procedure Note          Lumbar Drain:      Time: 1:08 AM  Performed by: Mattie Zaragoza MD  Authorized by: Mattie Zaragoza MD    Indications: CSF leak    Consent given by:The patient who states understanding of the procedure being performed after discussing the risks, benefits and alternatives.    Prior to the start of the procedure and with procedural staff participation, I verbally confirmed the patient s identity using two indicators, relevant allergies, that the procedure was appropriate and matched the consent or emergent situation, and that the correct equipment/implants were available. Immediately prior to starting the procedure I conducted the Time Out with the procedural staff and re-confirmed the patient s name, procedure, and site/side. (The Joint Commission universal protocol was followed.) Yes    Under sterile conditions the patient was positioned L Lateral decubitus with knees drawn up. Betadine solution and sterile drapes were utilized.  Local anesthetic at the site: 2 ml of lidocaine 1% without epinephrine   A spinal needle was inserted at the L 3-4 interspace.  Opening Pressurewas not checked.  The lumbar drain catheter was fed into the Needle and secured at the skin.  A total of 4mL of bloody spinal fluid was obtained and sent to the laboratory.   Complications:  None    Patient tolerance: Patient tolerated the procedure well with no immediate complications.    Drain 10-15cc/hr  Flat for one hour after procedure     and Dr. Haq were immediately available    Mattie Zaragoza  Neurosurgery

## 2020-10-08 NOTE — H&P
[facility]       NEUROSURGERY H&P NOTE    Reason for Consultation CSF leaks from the incision    HPI: Mr. Gaetano Holley is a 50 year-old male with past medical history significant for right frontal glioblastoma (5.5 x 5.0 cm), initially diagnosed in 1/2020 after presenting with seizure an on 9/23/2020 for scheduled right craniotomy and surgical resection for ecurrent GBM. After discharge to rehab he developed CSF leak from his incision on 10/6. Today he presented to the ED for further management.     Per spouse at bedside, he had long term memory issues since discharge but otherwise no recent fevers, chills, nausea, vomiting, chest pain, shortness of breath, and denies headaches, weakness, LOC, numbness/weakness/paresthesias in extremities, changes in sensation, taste, smell, nor trouble speaking or other neurologic symptoms.    PAST MEDICAL HISTORY:   Past Medical History:   Diagnosis Date     Abdominal pain 2008    unspecified      Acute cholecystitis 07/01/2008     Depressive disorder 1998     Erectile dysfunction 2008     Hypertension      Impaired fasting glucose 12/12/2008     Iron deficiency anemia      Seizures (H)        PAST SURGICAL HISTORY:   Past Surgical History:   Procedure Laterality Date     CHOLECYSTECTOMY  07/01/2008     colonoscopy biopsy  12/01/2008     MRI CRANIOTOMY WITH OPTICAL TRACKING SYSTEM Right 9/23/2020    Procedure: INTRAOPERATIVE Magnetic Resonance Imaging CRANIOTOMY, USING OPTICAL TRACKING SYSTEM, [employer];  Surgeon: Raul Haq MD;  Location: [facility] OR     right frontal craniotomy  01/07/2020       SOCIAL HISTORY:   Social History     Tobacco Use     Smoking status: Never Smoker     Smokeless tobacco: Never Used   Substance Use Topics     Alcohol use: Yes     Comment: occasionally       MEDICATIONS:  Current Outpatient Medications   Medication Sig Dispense Refill     ciclopirox (LOPROX) 0.77 % cream Apply topically as needed        [START ON 10/8/2020] dexamethasone (DECADRON) 1 MG tablet Take 1 tablet (1 mg) by mouth every 8 hours 12 tablet 0     dexamethasone (DECADRON) 1 MG tablet Take 3 tablets (3 mg) by mouth every 8 hours for 4 days 36 tablet 0     dexamethasone (DECADRON) 1.5 MG tablet Take 2 tablets (3 mg) by mouth every 8 hours 24 tablet 0     dexamethasone (DECADRON) 2 MG tablet Take 1 tablet (2 mg) by mouth every 8 hours 15 tablet 0     escitalopram (LEXAPRO) 10 MG tablet Take 1 tablet (10 mg) by mouth daily       fluticasone (FLONASE) 50 MCG/ACT nasal spray Spray 50 sprays into both nostrils daily       Heparin Sodium, Porcine, (HEPARIN ANTICOAGULANT) 5000 UNIT/0.5ML injection Inject 0.5 mLs (5,000 Units) Subcutaneous every 8 hours       levETIRAcetam (KEPPRA) 750 MG tablet Take 1 tablet (750 mg) by mouth 2 times daily       lisinopril (ZESTRIL) 10 MG tablet Take 1 tablet (10 mg) by mouth daily       methylphenidate (RITALIN) 5 MG tablet Take 1 tablet (5 mg) by mouth 2 times daily  0     omeprazole (PRILOSEC) 20 MG DR capsule TAKE 2 CAPSULES BY MOUTH ONCE DAILY BEFORE MEAL(S) -  DO  NOT  CRUSH       ondansetron (ZOFRAN) 8 MG tablet Take 1 tablet (8 mg) by mouth daily       oxyCODONE (ROXICODONE) 5 MG tablet Take 1-2 tablets (5-10 mg) by mouth every 3 hours as needed for pain 30 tablet 0     oxyCODONE (XTAMPZA ER) 13.5 MG 12 hr tablet Take 1 tablet (13.5 mg) by mouth every 12 hours  0     polyethylene glycol (MIRALAX) 17 g packet Take 17 g by mouth daily       prochlorperazine (COMPAZINE) 10 MG tablet Take 1 tablet (10 mg) by mouth as needed       senna-docusate (SENOKOT-S/PERICOLACE) 8.6-50 MG tablet Take 1 tablet by mouth 2 times daily       sildenafil (REVATIO) 20 MG tablet Take 1 tablet (20 mg) by mouth as needed         Allergies:  Allergies   Allergen Reactions     Food Other (See Comments)     Pineapple and pineapple juice causes severe vomiting     Morphine      Sedated but the pain was not relieved       Penicillins  "Other (See Comments)     Childhood reaction, possible anaphylaxis     Pineapple Nausea and Vomiting       ROS: 10 point ROS of systems including Constitutional, Eyes, Respiratory, Cardiovascular, Gastroenterology, Genitourinary, Integumentary, Muscularskeletal, Psychiatric were all negative except for pertinent positives noted in my HPI.    Physical exam:   Blood pressure 101/69, pulse 80, temperature 98.5  F (36.9  C), temperature source Oral, resp. rate 16, height 1.829 m (6'), weight 108.9 kg (240 lb), SpO2 100 %.  NEUROLOGIC:  -- Awake; Alert; oriented x 3  -- Follows commands briskly  -- +repetition, calculation, and naming  -- Speech fluent, spontaneous. No aphasia or dysarthria.  -- Mild L nasolabial flattening    Motor:  Normal bulk / tone; no tremor, rigidity, or bradykinesia.  No muscle wasting or fasciculations  No Pronator Drift  LUE and LLE 4/5 baseline per EMR    Clear csf leaking from the bottom of the incision on the right side    IMAGING:  CT H no signs of hydrocephalus, \"There is a 0.8 cm defect in the  posterior aspect of the craniotomy with extension of CSF density fluid  into the defect and into right frontal scalp, compatible with CSF  Leak.\"    LABS:   WBC 11.6  CRP>2.9    ASSESSMENT:  is a 50 year old man with a recent 9/23/2020 scheduled right craniotomy and surgical resection for ecurrent GBM with CSF leak from the incision.       RECOMMENDATIONS:  - LD placement, 10-15 ml/hr, possible 5 day of drainage  - Broad spectrm abx  - Blood, CSF culture  - Bedside wound revision    The patient was discussed with Dr. Haq, neurosurgery attending, he agrees with the above.    Mattie Zaragoza MD  Neurosurgery Resident   "

## 2020-10-08 NOTE — PHARMACY-VANCOMYCIN DOSING SERVICE
Pharmacy Vancomycin Initial Note  Date of Service 2020  Patient's  1970  50 year old, male    Indication: Meningitis    Current estimated CrCl = Estimated Creatinine Clearance: 130.8 mL/min (based on SCr of 0.88 mg/dL).    Creatinine for last 3 days  10/7/2020:  9:04 PM Creatinine 0.88 mg/dL    Recent Vancomycin Level(s) for last 3 days  No results found for requested labs within last 72 hours.      Vancomycin IV Administrations (past 72 hours)      No vancomycin orders with administrations in past 72 hours.                Nephrotoxins and other renal medications (From now, onward)    Start     Dose/Rate Route Frequency Ordered Stop    10/08/20 0800  lisinopril (ZESTRIL) tablet 10 mg      10 mg Oral DAILY 10/07/20 2228            Contrast Orders - past 72 hours (72h ago, onward)    None                Plan:  1.  Start vancomycin  2750 mg IV once, followed by 2250 mg IV q12h.   2.  Goal Trough Level: 15-20 mg/L   3.  Pharmacy will check trough levels as appropriate in 1-3 Days.    4. Serum creatinine levels will be ordered daily for the first week of therapy and at least twice weekly for subsequent weeks.    5. [place] method utilized to dose vancomycin therapy: Method 2    Abram Casiano [facility]

## 2020-10-08 NOTE — UTILIZATION REVIEW
"  Admission Status; Secondary Review Determination         Under the authority of the Utilization Management Committee, the utilization review process indicated a secondary review on the above patient.  The review outcome is based on review of the medical records, discussions with staff, and applying clinical experience noted on the date of the review.        (X)      Inpatient Status Appropriate - This patient's medical care is consistent with medical management for inpatient care and reasonable inpatient medical practice.      () Observation Status Appropriate - This patient does not meet hospital inpatient criteria and is placed in observation status. If this patient's primary payer is Medicare and was admitted as an inpatient, Condition Code 44 should be used and patient status changed to \"observation\".   () Admission Status NOT Appropriate - This patient's medical care is not consistent with medical management for Inpatient or Observation Status.          RATIONALE FOR DETERMINATION     50 year-old male with past medical history significant for right frontal glioblastoma (5.5 x 5.0 cm), initially diagnosed in 1/2020 after presenting with seizure an on 9/23/2020 for scheduled right craniotomy and surgical resection for ecurrent GBM. After discharge to rehab he developed CSF leak from his incision on 10/6.  He underwent lumbar drain 10/8/2020.  Cultures were sent, and patient was placed on broad-spectrum antibiotics.  He has required parenteral pain medications.  He will remain in the hospital pending culture results and symptom management.  He is appropriate for inpatient status.    The severity of illness, intensity of service provided, expected LOS and risk for adverse outcome make the care complex, high risk and appropriate for hospital admission.        The information on this document is developed by the utilization review team in order for the business office to ensure compliance.  This only denotes the " appropriateness of proper admission status and does not reflect the quality of care rendered.         The definitions of Inpatient Status and Observation Status used in making the determination above are those provided in the CMS Coverage Manual, Chapter 1 and Chapter 6, section 70.4.      Sincerely,     David Posadas MD  Physician Advisor  Utilization Review/ Case Management  [facility].

## 2020-10-09 LAB
ALBUMIN SERPL-MCNC: 3 G/DL (ref 3.4–5)
ALP SERPL-CCNC: 42 U/L (ref 40–150)
ALT SERPL W P-5'-P-CCNC: 73 U/L (ref 0–70)
ANION GAP SERPL CALCULATED.3IONS-SCNC: 6 MMOL/L (ref 3–14)
AST SERPL W P-5'-P-CCNC: 16 U/L (ref 0–45)
BILIRUB SERPL-MCNC: 0.5 MG/DL (ref 0.2–1.3)
BUN SERPL-MCNC: 17 MG/DL (ref 7–30)
CALCIUM SERPL-MCNC: 8.8 MG/DL (ref 8.5–10.1)
CHLORIDE SERPL-SCNC: 108 MMOL/L (ref 94–109)
CO2 SERPL-SCNC: 26 MMOL/L (ref 20–32)
CREAT SERPL-MCNC: 0.72 MG/DL (ref 0.66–1.25)
CRP SERPL-MCNC: 39 MG/L (ref 0–8)
ERYTHROCYTE [DISTWIDTH] IN BLOOD BY AUTOMATED COUNT: 14.6 % (ref 10–15)
GFR SERPL CREATININE-BSD FRML MDRD: >90 ML/MIN/{1.73_M2}
GLUCOSE SERPL-MCNC: 132 MG/DL (ref 70–99)
HCT VFR BLD AUTO: 36.4 % (ref 40–53)
HGB BLD-MCNC: 12.4 G/DL (ref 13.3–17.7)
MCH RBC QN AUTO: 31.2 PG (ref 26.5–33)
MCHC RBC AUTO-ENTMCNC: 34.1 G/DL (ref 31.5–36.5)
MCV RBC AUTO: 92 FL (ref 78–100)
PLATELET # BLD AUTO: 182 10E9/L (ref 150–450)
POTASSIUM SERPL-SCNC: 3.8 MMOL/L (ref 3.4–5.3)
PROT SERPL-MCNC: 6.2 G/DL (ref 6.8–8.8)
RBC # BLD AUTO: 3.97 10E12/L (ref 4.4–5.9)
SODIUM SERPL-SCNC: 140 MMOL/L (ref 133–144)
VANCOMYCIN SERPL-MCNC: 14.1 MG/L
VANCOMYCIN SERPL-MCNC: 32.5 MG/L
WBC # BLD AUTO: 11.6 10E9/L (ref 4–11)

## 2020-10-09 PROCEDURE — 36415 COLL VENOUS BLD VENIPUNCTURE: CPT | Performed by: NEUROLOGICAL SURGERY

## 2020-10-09 PROCEDURE — 250N000013 HC RX MED GY IP 250 OP 250 PS 637: Performed by: STUDENT IN AN ORGANIZED HEALTH CARE EDUCATION/TRAINING PROGRAM

## 2020-10-09 PROCEDURE — 250N000011 HC RX IP 250 OP 636: Performed by: STUDENT IN AN ORGANIZED HEALTH CARE EDUCATION/TRAINING PROGRAM

## 2020-10-09 PROCEDURE — 258N000003 HC RX IP 258 OP 636: Performed by: NEUROLOGICAL SURGERY

## 2020-10-09 PROCEDURE — 85027 COMPLETE CBC AUTOMATED: CPT | Performed by: STUDENT IN AN ORGANIZED HEALTH CARE EDUCATION/TRAINING PROGRAM

## 2020-10-09 PROCEDURE — 80202 ASSAY OF VANCOMYCIN: CPT | Performed by: NEUROLOGICAL SURGERY

## 2020-10-09 PROCEDURE — 250N000011 HC RX IP 250 OP 636: Performed by: NEUROLOGICAL SURGERY

## 2020-10-09 PROCEDURE — 120N000002 HC R&B MED SURG/OB UMMC

## 2020-10-09 PROCEDURE — 250N000013 HC RX MED GY IP 250 OP 250 PS 637: Performed by: NURSE PRACTITIONER

## 2020-10-09 PROCEDURE — 86140 C-REACTIVE PROTEIN: CPT | Performed by: STUDENT IN AN ORGANIZED HEALTH CARE EDUCATION/TRAINING PROGRAM

## 2020-10-09 PROCEDURE — 80053 COMPREHEN METABOLIC PANEL: CPT | Performed by: STUDENT IN AN ORGANIZED HEALTH CARE EDUCATION/TRAINING PROGRAM

## 2020-10-09 PROCEDURE — 36415 COLL VENOUS BLD VENIPUNCTURE: CPT | Performed by: STUDENT IN AN ORGANIZED HEALTH CARE EDUCATION/TRAINING PROGRAM

## 2020-10-09 RX ORDER — METHOCARBAMOL 750 MG/1
750 TABLET, FILM COATED ORAL 4 TIMES DAILY
Status: DISCONTINUED | OUTPATIENT
Start: 2020-10-09 | End: 2020-10-15

## 2020-10-09 RX ORDER — HYDROMORPHONE HCL/0.9% NACL/PF 0.2MG/0.2
0.2 SYRINGE (ML) INTRAVENOUS
Status: DISCONTINUED | OUTPATIENT
Start: 2020-10-09 | End: 2020-10-19 | Stop reason: HOSPADM

## 2020-10-09 RX ADMIN — LEVETIRACETAM 1500 MG: 750 TABLET, FILM COATED ORAL at 12:24

## 2020-10-09 RX ADMIN — DEXAMETHASONE SODIUM PHOSPHATE 2 MG: 4 INJECTION, SOLUTION INTRAMUSCULAR; INTRAVENOUS at 08:15

## 2020-10-09 RX ADMIN — PROCHLORPERAZINE EDISYLATE 10 MG: 5 INJECTION INTRAMUSCULAR; INTRAVENOUS at 03:57

## 2020-10-09 RX ADMIN — METHOCARBAMOL 750 MG: 750 TABLET, FILM COATED ORAL at 14:38

## 2020-10-09 RX ADMIN — CEFEPIME 2 G: 2 INJECTION, POWDER, FOR SOLUTION INTRAVENOUS at 16:01

## 2020-10-09 RX ADMIN — CEFEPIME 2 G: 2 INJECTION, POWDER, FOR SOLUTION INTRAVENOUS at 00:17

## 2020-10-09 RX ADMIN — PROCHLORPERAZINE MALEATE 10 MG: 10 TABLET ORAL at 17:57

## 2020-10-09 RX ADMIN — LEVETIRACETAM 1500 MG: 750 TABLET, FILM COATED ORAL at 19:15

## 2020-10-09 RX ADMIN — METRONIDAZOLE 500 MG: 500 INJECTION, SOLUTION INTRAVENOUS at 20:20

## 2020-10-09 RX ADMIN — CEFEPIME 2 G: 2 INJECTION, POWDER, FOR SOLUTION INTRAVENOUS at 09:04

## 2020-10-09 RX ADMIN — HYDROMORPHONE HYDROCHLORIDE 0.5 MG: 1 INJECTION, SOLUTION INTRAMUSCULAR; INTRAVENOUS; SUBCUTANEOUS at 01:25

## 2020-10-09 RX ADMIN — OXYCODONE HYDROCHLORIDE 5 MG: 5 TABLET ORAL at 13:19

## 2020-10-09 RX ADMIN — ONDANSETRON 4 MG: 4 TABLET, ORALLY DISINTEGRATING ORAL at 11:22

## 2020-10-09 RX ADMIN — CEFEPIME 2 G: 2 INJECTION, POWDER, FOR SOLUTION INTRAVENOUS at 23:40

## 2020-10-09 RX ADMIN — METRONIDAZOLE 500 MG: 500 INJECTION, SOLUTION INTRAVENOUS at 01:25

## 2020-10-09 RX ADMIN — METRONIDAZOLE 500 MG: 500 INJECTION, SOLUTION INTRAVENOUS at 07:40

## 2020-10-09 RX ADMIN — ONDANSETRON 4 MG: 2 INJECTION INTRAMUSCULAR; INTRAVENOUS at 01:25

## 2020-10-09 RX ADMIN — METRONIDAZOLE 500 MG: 500 INJECTION, SOLUTION INTRAVENOUS at 14:04

## 2020-10-09 RX ADMIN — VANCOMYCIN HYDROCHLORIDE 1750 MG: 10 INJECTION, POWDER, LYOPHILIZED, FOR SOLUTION INTRAVENOUS at 03:01

## 2020-10-09 RX ADMIN — VANCOMYCIN HYDROCHLORIDE 2000 MG: 10 INJECTION, POWDER, LYOPHILIZED, FOR SOLUTION INTRAVENOUS at 17:35

## 2020-10-09 RX ADMIN — OMEPRAZOLE 40 MG: 20 CAPSULE, DELAYED RELEASE ORAL at 16:01

## 2020-10-09 RX ADMIN — METHOCARBAMOL 750 MG: 750 TABLET, FILM COATED ORAL at 19:15

## 2020-10-09 ASSESSMENT — ACTIVITIES OF DAILY LIVING (ADL)
ADLS_ACUITY_SCORE: 16
ADLS_ACUITY_SCORE: 17
ADLS_ACUITY_SCORE: 17
ADLS_ACUITY_SCORE: 21
ADLS_ACUITY_SCORE: 16
ADLS_ACUITY_SCORE: 16

## 2020-10-09 ASSESSMENT — VISUAL ACUITY
OU: GLASSES;BASELINE
OU: GLASSES;BASELINE

## 2020-10-09 NOTE — PHARMACY-VANCOMYCIN DOSING SERVICE
Pharmacy Vancomycin Note  Date of Service 2020  Patient's  1970   50 year old, male    Indication: Meningitis  Goal Trough Level: 15-20 mg/L  Day of Therapy: 2  Current Vancomycin regimen: 1750 mg IV q8h    Current estimated CrCl = Estimated Creatinine Clearance: 159.9 mL/min (based on SCr of 0.72 mg/dL).    Creatinine for last 3 days  10/7/2020:  9:04 PM Creatinine 0.88 mg/dL  10/8/2020:  6:07 AM Creatinine 0.82 mg/dL  10/9/2020:  6:50 AM Creatinine 0.72 mg/dL    Recent Vancomycin Levels (past 3 days)  10/9/2020:  9:00 AM Vancomycin Level 32.5 mg/L (6 hr trough)    Vancomycin IV Administrations (past 72 hours)                   vancomycin (VANCOCIN) 1,750 mg in sodium chloride 0.9 % 500 mL intermittent infusion (mg) 1,750 mg New Bag 10/09/20 0301     1,750 mg New Bag 10/08/20 1739     1,750 mg New Bag  1100    vancomycin (VANCOCIN) 2,750 mg in sodium chloride 0.9 % 500 mL intermittent infusion (mg) 2,750 mg New Bag 10/08/20 0207                Nephrotoxins and other renal medications (From now, onward)    Start     Dose/Rate Route Frequency Ordered Stop    10/09/20 1130  vancomycin (VANCOCIN) 1,750 mg in sodium chloride 0.9 % 500 mL intermittent infusion      1,750 mg  over 2 Hours Intravenous HOLD 10/09/20 1105      10/08/20 0800  lisinopril (ZESTRIL) tablet 10 mg      10 mg Oral DAILY 10/07/20 2228               Contrast Orders - past 72 hours (72h ago, onward)    None          Interpretation of levels and current regimen:  Trough level is Supratherapeutic. Will set up another level for 1500 today which will be 12 hours from last dose to see if pt is more appropriate for q12h dosing instead.     Has serum creatinine changed > 50% in last 72 hours: No    Urine output:  unable to determine, not charting    Renal Function: Stable      Plan:  1.  HOLD vancomycin therapy for now.  2.  Pharmacy will check trough levels again scheduled for 1500 today.    3.  Serum creatinine levels will be ordered  daily for the first week of therapy and at least twice weekly for subsequent weeks.        Marcell Martinez, PharmD, BCPS  October 9, 2020        .

## 2020-10-09 NOTE — PHARMACY-VANCOMYCIN DOSING SERVICE
Pharmacy Vancomycin Note  Date of Service 2020  Patient's  1970   50 year old, male    Indication: Meningitis  Goal Trough Level: 15-20 mg/L  Day of Therapy: 2  Current Vancomycin regimen:  1750 mg IV q8h -- on hold after supratherapeutic level this AM.    Current estimated CrCl = Estimated Creatinine Clearance: 159.9 mL/min (based on SCr of 0.72 mg/dL).    Creatinine for last 3 days  10/7/2020:  9:04 PM Creatinine 0.88 mg/dL  10/8/2020:  6:07 AM Creatinine 0.82 mg/dL  10/9/2020:  6:50 AM Creatinine 0.72 mg/dL    Recent Vancomycin Levels (past 3 days)  10/9/2020:  9:00 AM Vancomycin Level 32.5 mg/L;  4:08 PM Vancomycin Level 14.1 mg/L    Vancomycin IV Administrations (past 72 hours)                   vancomycin (VANCOCIN) 1,750 mg in sodium chloride 0.9 % 500 mL intermittent infusion (mg) 1,750 mg New Bag 10/09/20 0301     1,750 mg New Bag 10/08/20 1739     1,750 mg New Bag  1100    vancomycin (VANCOCIN) 2,750 mg in sodium chloride 0.9 % 500 mL intermittent infusion (mg) 2,750 mg New Bag 10/08/20 0207                Nephrotoxins and other renal medications (From now, onward)    Start     Dose/Rate Route Frequency Ordered Stop    10/09/20 1700  vancomycin (VANCOCIN) 2,000 mg in sodium chloride 0.9 % 500 mL intermittent infusion      2,000 mg  over 2 Hours Intravenous EVERY 12 HOURS 10/09/20 1655      10/08/20 0800  lisinopril (ZESTRIL) tablet 10 mg      10 mg Oral DAILY 10/07/20 2228               Contrast Orders - past 72 hours (72h ago, onward)    None          Interpretation of levels and current regimen:  Trough level is  Supratherapeutic    Has serum creatinine changed > 50% in last 72 hours: No    Urine output:  good urine output    Renal Function: Stable    Plan:  1.  Increase Dose to 2000mg IV q12h.  2.  Pharmacy will check trough levels as appropriate in 1-3 Days.    3. Serum creatinine levels will be ordered daily for the first week of therapy and at least twice weekly for subsequent  nahomy.      Jocelyn Horn RPH        .

## 2020-10-09 NOTE — PLAN OF CARE
Pt was admitted for CSF leak from head incision. Recent R craniotomy & surgical resection of recurrent GBM on 9/23, vss, neuros include: lethargic, a/o x4, slow to respond verbally and physically, slightly L neglect, 5/5 throughout with generalized weakness, pt impulsive and forgetful when he needs to void so BA/CA on @ all times. PIV SL between ABX (x3), regular diet, voiding, up SBA with GB. LD site CDI, draining slightly over and WNL every hour with clear/yellow drainage. PRN pain meds provided, pt had x1 episode  of emesis, PRN compazine given, pt had x1 BM this shift. Continue to monitor per orders.

## 2020-10-09 NOTE — PLAN OF CARE
"Status: Pt was admitted for CSF leak from head incision. Recent R craniotomy & surgical resection of recurrent GBM on 9/23.   Vitals: VSS, RA.   Neuros: D/o to time over shift. Flat affect. Forgetful. Needs prompting at times to complete tasks - MD aware. Impulsive at times. Pt reported they felt \"a little confused\" a few times but was able to reorient self w/ a little help. 5/5 all extremities. Denies N/T. Denies vision issues.   IV: R PIV infiltrated, removed, slightly reddened at site. L  PIV SL in between IV Abx.   LDA: Lumbar drain titrated to 10-15 mL /hour. Clear, yellow. LD dressing CDI. Small serosanguinous drainage to dressing.    Resp/trach: LS CTA.   Diet: Regular.   Bowel status: Pt was intermittently incontinent of loose stools this shift. Urgency. Intermittent nausea. Emesis x3. Zofran & compazine given.   : Voiding w/o difficulty.   Skin: Head incisions ANDREW. Crani site w/ sutures, no drainage. Some erythema. Bruising noted on BUE.   Pain: Pt c/o back pain, generalized discomfort. Given dilaudid this shift w/ relief.  Activity: Up w/ SB-1A, GB. BA on, bed locked. Pt needs reminders to call for assistance. Pt had episode of dizziness, lightheadedness w/ emesis. MD paged & updated over phone.   Plan: Continue to monitor & follow POC.   "

## 2020-10-09 NOTE — PLAN OF CARE
VSS. Pt did dip to 89% while sleeping, woke up before oxygen could be placed. C/O headache and back pain robaxin and oxycodone given. Pt lethargic this am. MDs came and rounded, wife also updated at bedside. More alert this afternoon, can be slow to respond and has flat affect. Mild LUE tremors.  Right head incisoin with no drainage, ANRDEW. Bruising to BUE. Goal is to titrate LD to 10-15 ml/hr (4-18 ml/hr this shift, hard with pt repositioning often). LD with old drainage on back, marked. Regular diet, fair po (pt had emesis, Zofran given). Inc of urine x 3, this is new for pt and MD was made aware overnight. Had BM yesterday. Up with SBA, GB. Pt likes to reposition frequently from bed/chair. Alarm on at all times. Wife at bedside and helpful with cares.

## 2020-10-09 NOTE — PROGRESS NOTES
Neurosurgery progress note    S: No acute events overnight, had some n/v after receiving dilaudid. No fever.     O:  Temp:  [95.1  F (35.1  C)-98  F (36.7  C)] 96.9  F (36.1  C)  Pulse:  [] 98  Resp:  [16-18] 18  BP: (106-127)/(77-87) 127/78  SpO2:  [97 %-99 %] 99 %    Exam:  NEUROLOGIC:  -- Awake; Alert; oriented x 3  -- Follows commands briskly  -- +repetition, calculation, and naming  -- Speech fluent, spontaneous. No aphasia or dysarthria.  -- Mild L nasolabial flattening     Motor:  Normal bulk / tone; no tremor, rigidity, or bradykinesia.  No muscle wasting or fasciculations  No Pronator Drift  LUE and LLE 4/5 baseline per EMR    Cranio incision appears to be dry    CTH Unchanged vasogenic edema in the right subcortical and deep white matter, cerebral edema due to tumor, treated by decadron    ASSESSMENT:  is a 50 year old man with a recent 9/23/2020 scheduled right craniotomy and surgical resection for ecurrent GBM with CSF leak from the incision. LD placed on 10/8 incision revised        RECOMMENDATIONS:  - LD placement, 10-15 ml/hr, possible 5 day of drainage (last day 10/12)  - Broad spectrm abx  - Follow up Blood, CSF culture  - Increased robaxin, limit narcotic use to avoid n/v     Mattie Zaragoza MD  Neurosurgery Resident     Please contact neurosurgery resident on call with questions.    Dial * * [phone], enter [phone] when prompted

## 2020-10-09 NOTE — PROVIDER NOTIFICATION
Neurosurgery phone paged regarding patient's increased lethargy, new urine incontinence, and new serosanguinous drainage on lumbar drain dressing. MD Fish assessed patient at bedside.   Oncoming bedside RN updated.

## 2020-10-10 ENCOUNTER — APPOINTMENT (OUTPATIENT)
Dept: CT IMAGING | Facility: CLINIC | Age: 50
DRG: 031 | End: 2020-10-10
Attending: STUDENT IN AN ORGANIZED HEALTH CARE EDUCATION/TRAINING PROGRAM
Payer: COMMERCIAL

## 2020-10-10 LAB
ALBUMIN SERPL-MCNC: 2.8 G/DL (ref 3.4–5)
ALP SERPL-CCNC: 46 U/L (ref 40–150)
ALT SERPL W P-5'-P-CCNC: 81 U/L (ref 0–70)
ANION GAP SERPL CALCULATED.3IONS-SCNC: 6 MMOL/L (ref 3–14)
AST SERPL W P-5'-P-CCNC: ABNORMAL U/L (ref 0–45)
BILIRUB SERPL-MCNC: 0.8 MG/DL (ref 0.2–1.3)
BUN SERPL-MCNC: 13 MG/DL (ref 7–30)
CALCIUM SERPL-MCNC: 8.5 MG/DL (ref 8.5–10.1)
CHLORIDE SERPL-SCNC: 108 MMOL/L (ref 94–109)
CO2 SERPL-SCNC: 26 MMOL/L (ref 20–32)
CREAT SERPL-MCNC: 0.67 MG/DL (ref 0.66–1.25)
CRP SERPL-MCNC: 40 MG/L (ref 0–8)
ERYTHROCYTE [DISTWIDTH] IN BLOOD BY AUTOMATED COUNT: 14.3 % (ref 10–15)
GFR SERPL CREATININE-BSD FRML MDRD: >90 ML/MIN/{1.73_M2}
GLUCOSE SERPL-MCNC: 128 MG/DL (ref 70–99)
HCT VFR BLD AUTO: 34.7 % (ref 40–53)
HGB BLD-MCNC: 11.6 G/DL (ref 13.3–17.7)
MCH RBC QN AUTO: 31.3 PG (ref 26.5–33)
MCHC RBC AUTO-ENTMCNC: 33.4 G/DL (ref 31.5–36.5)
MCV RBC AUTO: 94 FL (ref 78–100)
PLATELET # BLD AUTO: 201 10E9/L (ref 150–450)
POTASSIUM SERPL-SCNC: 4 MMOL/L (ref 3.4–5.3)
PROT SERPL-MCNC: 6.2 G/DL (ref 6.8–8.8)
RBC # BLD AUTO: 3.71 10E12/L (ref 4.4–5.9)
SODIUM SERPL-SCNC: 140 MMOL/L (ref 133–144)
WBC # BLD AUTO: 7.9 10E9/L (ref 4–11)

## 2020-10-10 PROCEDURE — 84460 ALANINE AMINO (ALT) (SGPT): CPT

## 2020-10-10 PROCEDURE — 250N000011 HC RX IP 250 OP 636: Performed by: STUDENT IN AN ORGANIZED HEALTH CARE EDUCATION/TRAINING PROGRAM

## 2020-10-10 PROCEDURE — 85027 COMPLETE CBC AUTOMATED: CPT | Performed by: NEUROLOGICAL SURGERY

## 2020-10-10 PROCEDURE — 250N000013 HC RX MED GY IP 250 OP 250 PS 637: Performed by: STUDENT IN AN ORGANIZED HEALTH CARE EDUCATION/TRAINING PROGRAM

## 2020-10-10 PROCEDURE — 84155 ASSAY OF PROTEIN SERUM: CPT

## 2020-10-10 PROCEDURE — 250N000013 HC RX MED GY IP 250 OP 250 PS 637: Performed by: NURSE PRACTITIONER

## 2020-10-10 PROCEDURE — 250N000011 HC RX IP 250 OP 636: Performed by: NEUROLOGICAL SURGERY

## 2020-10-10 PROCEDURE — 82247 BILIRUBIN TOTAL: CPT

## 2020-10-10 PROCEDURE — 86140 C-REACTIVE PROTEIN: CPT | Performed by: NEUROLOGICAL SURGERY

## 2020-10-10 PROCEDURE — 120N000002 HC R&B MED SURG/OB UMMC

## 2020-10-10 PROCEDURE — 36415 COLL VENOUS BLD VENIPUNCTURE: CPT | Performed by: NEUROLOGICAL SURGERY

## 2020-10-10 PROCEDURE — 80048 BASIC METABOLIC PNL TOTAL CA: CPT

## 2020-10-10 PROCEDURE — 82040 ASSAY OF SERUM ALBUMIN: CPT

## 2020-10-10 PROCEDURE — 70450 CT HEAD/BRAIN W/O DYE: CPT | Mod: 26 | Performed by: RADIOLOGY

## 2020-10-10 PROCEDURE — 84075 ASSAY ALKALINE PHOSPHATASE: CPT

## 2020-10-10 PROCEDURE — 70450 CT HEAD/BRAIN W/O DYE: CPT

## 2020-10-10 PROCEDURE — 258N000003 HC RX IP 258 OP 636: Performed by: NEUROLOGICAL SURGERY

## 2020-10-10 RX ADMIN — VANCOMYCIN HYDROCHLORIDE 2000 MG: 10 INJECTION, POWDER, LYOPHILIZED, FOR SOLUTION INTRAVENOUS at 05:23

## 2020-10-10 RX ADMIN — VANCOMYCIN HYDROCHLORIDE 2000 MG: 10 INJECTION, POWDER, LYOPHILIZED, FOR SOLUTION INTRAVENOUS at 18:08

## 2020-10-10 RX ADMIN — METRONIDAZOLE 500 MG: 500 INJECTION, SOLUTION INTRAVENOUS at 02:00

## 2020-10-10 RX ADMIN — METHOCARBAMOL 750 MG: 750 TABLET, FILM COATED ORAL at 14:10

## 2020-10-10 RX ADMIN — Medication 0.2 MG: at 04:25

## 2020-10-10 RX ADMIN — METHYLPHENIDATE HYDROCHLORIDE 5 MG: 5 TABLET ORAL at 14:10

## 2020-10-10 RX ADMIN — PROCHLORPERAZINE MALEATE 10 MG: 10 TABLET ORAL at 15:43

## 2020-10-10 RX ADMIN — METHOCARBAMOL 750 MG: 750 TABLET, FILM COATED ORAL at 07:54

## 2020-10-10 RX ADMIN — ESCITALOPRAM 10 MG: 10 TABLET, FILM COATED ORAL at 07:54

## 2020-10-10 RX ADMIN — LEVETIRACETAM 1500 MG: 750 TABLET, FILM COATED ORAL at 07:54

## 2020-10-10 RX ADMIN — METHOCARBAMOL 750 MG: 750 TABLET, FILM COATED ORAL at 20:10

## 2020-10-10 RX ADMIN — OMEPRAZOLE 40 MG: 20 CAPSULE, DELAYED RELEASE ORAL at 07:53

## 2020-10-10 RX ADMIN — PROCHLORPERAZINE MALEATE 10 MG: 10 TABLET ORAL at 07:56

## 2020-10-10 RX ADMIN — LISINOPRIL 10 MG: 10 TABLET ORAL at 07:54

## 2020-10-10 RX ADMIN — Medication 0.2 MG: at 08:03

## 2020-10-10 RX ADMIN — CEFEPIME 2 G: 2 INJECTION, POWDER, FOR SOLUTION INTRAVENOUS at 15:42

## 2020-10-10 RX ADMIN — Medication 0.2 MG: at 15:53

## 2020-10-10 RX ADMIN — METRONIDAZOLE 500 MG: 500 INJECTION, SOLUTION INTRAVENOUS at 16:26

## 2020-10-10 RX ADMIN — PROCHLORPERAZINE MALEATE 10 MG: 10 TABLET ORAL at 07:53

## 2020-10-10 RX ADMIN — METRONIDAZOLE 500 MG: 500 INJECTION, SOLUTION INTRAVENOUS at 09:36

## 2020-10-10 RX ADMIN — CEFEPIME 2 G: 2 INJECTION, POWDER, FOR SOLUTION INTRAVENOUS at 07:54

## 2020-10-10 RX ADMIN — OMEPRAZOLE 40 MG: 20 CAPSULE, DELAYED RELEASE ORAL at 15:42

## 2020-10-10 RX ADMIN — LEVETIRACETAM 1500 MG: 750 TABLET, FILM COATED ORAL at 20:10

## 2020-10-10 RX ADMIN — DEXAMETHASONE SODIUM PHOSPHATE 2 MG: 4 INJECTION, SOLUTION INTRAMUSCULAR; INTRAVENOUS at 07:55

## 2020-10-10 RX ADMIN — METRONIDAZOLE 500 MG: 500 INJECTION, SOLUTION INTRAVENOUS at 22:57

## 2020-10-10 RX ADMIN — METHOCARBAMOL 750 MG: 750 TABLET, FILM COATED ORAL at 18:08

## 2020-10-10 ASSESSMENT — ACTIVITIES OF DAILY LIVING (ADL)
ADLS_ACUITY_SCORE: 20
ADLS_ACUITY_SCORE: 21
ADLS_ACUITY_SCORE: 20

## 2020-10-10 NOTE — PLAN OF CARE
Status: Pt on 6A for CSF leak from head incision. Lumbar drain placed 10/8. R crani and surgical resection of recurrent GBM on 9/23.  Vitals: VSS on RA  Neuros: A&Ox4. Flat affect, slow to respond. Needs repetitive cues to perform commands. Impulsive. Forgetful. James N/T. 5/5 throughout. No LUE tremors noted this shift.   IV: PIV TKO between abx  Resp/trach: Denies SOB. RA  Diet: Regular   Bowel status: No BM this shift. Last BM 10/9  :  Intermittent incontinence d/t urgency. Not baseline, MDs aware   Skin: Crani incision, ANDREW. Bruising throughout. LD has old drainage on dressing.   Pain: C/o HA and back pain intermittently. PRN pain meds given   Activity: 1A GB. Impulsive, needs cues to use call light and slow down when walking.   Social: Wife at bedside during days.   Plan: LD at shoulder to 10-15ml/hr. Possible 5 day drainage plan. Continue with abx. Limit narcotic use d/t N/V. Paged neurosurgery due to leakage. Drain reinforced with new dressing. See flowsheet for output.

## 2020-10-10 NOTE — PLAN OF CARE
Status: Pt on 6A for CSF leak from head incision. Lumbar drain placed 10/8. R crani and surgical resection of recurrent GBM on 9/23.  Vitals: VSS on RA  Neuros: A&Ox4. Flat affect, slow to respond, lethargic this shift. Needs repetitive cues to perform commands. Impulsive. Forgetful. James N/T. 5/5 throughout. No LUE tremors noted this shift.   IV: PIV TKO between abx  Resp/trach: Denies SOB. RA  Diet: Regular   Bowel status: No BM this shift. Last BM 10/9  :  Intermittent incontinence d/t urgency. Not baseline, MDs aware   Skin: Crani incision, ANDREW. Bruising throughout. LD has old drainage on dressing.   Pain: Denies.   Activity: 1A GB. Impulsive, needs cues to use call light and slow down when walking.   Social: Wife at bedside during days.   Plan: Titrate LD to 10-15ml/hr. Possible 5 day drainage plan. Continue with abx. Limit narcotic use d/t N/V. Paged neurosurgery d/t drain not putting out 10-15 at shoulder height like prior, head CT ordered. May change orders depending on result.

## 2020-10-10 NOTE — PROGRESS NOTES
Neurosurgery progress note    S: Had some mild bleeding at the LD site, no csf leakage from cranial incision    O:  Temp:  [95.9  F (35.5  C)-98.4  F (36.9  C)] 98.4  F (36.9  C)  Pulse:  [] 107  Resp:  [14-20] 14  BP: (108-142)/() 117/85  SpO2:  [96 %-99 %] 98 %    Exam:  NEUROLOGIC:  -- Awake; Alert; oriented x 3  -- Follows commands briskly  -- +repetition, calculation, and naming  -- Speech fluent, spontaneous. No aphasia or dysarthria.  -- Mild L nasolabial flattening     Motor:  Normal bulk / tone; no tremor, rigidity, or bradykinesia.  No muscle wasting or fasciculations  No Pronator Drift  LUE and LLE 4/5 baseline per EMR    Cranio incision appears to be dry    Cultures NGTD    ASSESSMENT:  is a 50 year old man with a recent 9/23/2020 scheduled right craniotomy and surgical resection for ecurrent GBM with CSF leak from the incision. LD placed on 10/8 incision revised        RECOMMENDATIONS:  - LD placement, 10-15 ml/hr, possible 5 day of drainage (last day 10/12)  - Broad spectrm abx  - Follow up Blood, CSF culture    Mattie Zaragoza MD  Neurosurgery Resident     Please contact neurosurgery resident on call with questions.    Dial * * [phone], enter [phone] when prompted

## 2020-10-10 NOTE — SIGNIFICANT EVENT
MD paged at 1225 d/t pt's bed pad wet with CSF fluid. Large amount noticed. Pt has HA, neck stiffness, nausea and  lower back. VSS on RA. Neuro's intact.

## 2020-10-10 NOTE — PLAN OF CARE
Status: Pt on 6A for CSF leak from head incision. R crani and surgical resection of recurrent GBM on 9/23.  Vitals: VSS on RA.   Neuros: A&O x4 this shift. Flat affect, slow to respond. Forgetful and impulsive at times. Denies N/T and vision changes. Strengths 5/5. Mild LUE tremors.  IV: PIV TKO between antibiotics.   Resp/trach: LS clear. Denies SOB.   Diet: Regular, fair intake. Compazine x1 for nausea when dinner arrived.   Bowel status: BS+. Last BM this shift.   : Incontinent of urine, which is not baseline for patient and team is aware. Urgency.   Skin: Head incisions ANDREW, WDL. Bruising throughout. LD dressing with old drainage. LD with scant amount of blood, team aware and came to assess. Titrate to 10-15 mL/hr.   Pain: Mild HA and neck pain, declined pain meds. Scheduled Robaxin given.   Activity: Assist of one and GB. Pt impulsive, both bed and chair alarms need to be on at all times. Pt needs reminders to call for help. Pt likes to reposition frequently.  Social: Wife at bedside at start of shift.   Plan: Continue to monitor and follow plan of care.

## 2020-10-11 LAB
ALBUMIN SERPL-MCNC: 2.9 G/DL (ref 3.4–5)
ALP SERPL-CCNC: 48 U/L (ref 40–150)
ALT SERPL W P-5'-P-CCNC: 82 U/L (ref 0–70)
ANION GAP SERPL CALCULATED.3IONS-SCNC: 7 MMOL/L (ref 3–14)
AST SERPL W P-5'-P-CCNC: 22 U/L (ref 0–45)
BILIRUB SERPL-MCNC: 0.8 MG/DL (ref 0.2–1.3)
BUN SERPL-MCNC: 14 MG/DL (ref 7–30)
CALCIUM SERPL-MCNC: 8.4 MG/DL (ref 8.5–10.1)
CHLORIDE SERPL-SCNC: 109 MMOL/L (ref 94–109)
CO2 SERPL-SCNC: 25 MMOL/L (ref 20–32)
CREAT SERPL-MCNC: 0.69 MG/DL (ref 0.66–1.25)
CRP SERPL-MCNC: 19 MG/L (ref 0–8)
ERYTHROCYTE [DISTWIDTH] IN BLOOD BY AUTOMATED COUNT: 14 % (ref 10–15)
GFR SERPL CREATININE-BSD FRML MDRD: >90 ML/MIN/{1.73_M2}
GLUCOSE SERPL-MCNC: 127 MG/DL (ref 70–99)
HCT VFR BLD AUTO: 33.2 % (ref 40–53)
HGB BLD-MCNC: 11.1 G/DL (ref 13.3–17.7)
MCH RBC QN AUTO: 31.3 PG (ref 26.5–33)
MCHC RBC AUTO-ENTMCNC: 33.4 G/DL (ref 31.5–36.5)
MCV RBC AUTO: 94 FL (ref 78–100)
PLATELET # BLD AUTO: 172 10E9/L (ref 150–450)
POTASSIUM SERPL-SCNC: 3.4 MMOL/L (ref 3.4–5.3)
PROT SERPL-MCNC: 5.9 G/DL (ref 6.8–8.8)
RBC # BLD AUTO: 3.55 10E12/L (ref 4.4–5.9)
SODIUM SERPL-SCNC: 141 MMOL/L (ref 133–144)
VANCOMYCIN SERPL-MCNC: 14.3 MG/L
WBC # BLD AUTO: 7.7 10E9/L (ref 4–11)

## 2020-10-11 PROCEDURE — 250N000011 HC RX IP 250 OP 636: Performed by: NEUROLOGICAL SURGERY

## 2020-10-11 PROCEDURE — 999N000128 HC STATISTIC PERIPHERAL IV START W/O US GUIDANCE

## 2020-10-11 PROCEDURE — 250N000013 HC RX MED GY IP 250 OP 250 PS 637: Performed by: NURSE PRACTITIONER

## 2020-10-11 PROCEDURE — 120N000002 HC R&B MED SURG/OB UMMC

## 2020-10-11 PROCEDURE — 250N000013 HC RX MED GY IP 250 OP 250 PS 637: Performed by: STUDENT IN AN ORGANIZED HEALTH CARE EDUCATION/TRAINING PROGRAM

## 2020-10-11 PROCEDURE — 258N000003 HC RX IP 258 OP 636: Performed by: NEUROLOGICAL SURGERY

## 2020-10-11 PROCEDURE — 250N000011 HC RX IP 250 OP 636: Performed by: STUDENT IN AN ORGANIZED HEALTH CARE EDUCATION/TRAINING PROGRAM

## 2020-10-11 PROCEDURE — 36415 COLL VENOUS BLD VENIPUNCTURE: CPT | Performed by: NEUROLOGICAL SURGERY

## 2020-10-11 PROCEDURE — 80053 COMPREHEN METABOLIC PANEL: CPT | Performed by: NEUROLOGICAL SURGERY

## 2020-10-11 PROCEDURE — 99024 POSTOP FOLLOW-UP VISIT: CPT | Mod: GC | Performed by: NEUROLOGICAL SURGERY

## 2020-10-11 PROCEDURE — 85027 COMPLETE CBC AUTOMATED: CPT | Performed by: NEUROLOGICAL SURGERY

## 2020-10-11 PROCEDURE — 80202 ASSAY OF VANCOMYCIN: CPT | Performed by: NEUROLOGICAL SURGERY

## 2020-10-11 PROCEDURE — 86140 C-REACTIVE PROTEIN: CPT | Performed by: NEUROLOGICAL SURGERY

## 2020-10-11 RX ORDER — ACETAMINOPHEN 325 MG/1
650 TABLET ORAL EVERY 4 HOURS PRN
Status: DISCONTINUED | OUTPATIENT
Start: 2020-10-11 | End: 2020-10-19 | Stop reason: HOSPADM

## 2020-10-11 RX ADMIN — OMEPRAZOLE 40 MG: 20 CAPSULE, DELAYED RELEASE ORAL at 08:28

## 2020-10-11 RX ADMIN — VANCOMYCIN HYDROCHLORIDE 2000 MG: 10 INJECTION, POWDER, LYOPHILIZED, FOR SOLUTION INTRAVENOUS at 18:05

## 2020-10-11 RX ADMIN — METHOCARBAMOL 750 MG: 750 TABLET, FILM COATED ORAL at 11:27

## 2020-10-11 RX ADMIN — Medication 1 MG: at 21:59

## 2020-10-11 RX ADMIN — CEFEPIME 2 G: 2 INJECTION, POWDER, FOR SOLUTION INTRAVENOUS at 15:58

## 2020-10-11 RX ADMIN — METHOCARBAMOL 750 MG: 750 TABLET, FILM COATED ORAL at 15:57

## 2020-10-11 RX ADMIN — ACETAMINOPHEN 650 MG: 325 TABLET, FILM COATED ORAL at 16:58

## 2020-10-11 RX ADMIN — DIAZEPAM 5 MG: 5 TABLET ORAL at 21:59

## 2020-10-11 RX ADMIN — LEVETIRACETAM 1500 MG: 750 TABLET, FILM COATED ORAL at 08:26

## 2020-10-11 RX ADMIN — METHOCARBAMOL 750 MG: 750 TABLET, FILM COATED ORAL at 08:26

## 2020-10-11 RX ADMIN — CEFEPIME 2 G: 2 INJECTION, POWDER, FOR SOLUTION INTRAVENOUS at 00:16

## 2020-10-11 RX ADMIN — METHYLPHENIDATE HYDROCHLORIDE 5 MG: 5 TABLET ORAL at 08:21

## 2020-10-11 RX ADMIN — ESCITALOPRAM 10 MG: 10 TABLET, FILM COATED ORAL at 08:28

## 2020-10-11 RX ADMIN — VANCOMYCIN HYDROCHLORIDE 2000 MG: 10 INJECTION, POWDER, LYOPHILIZED, FOR SOLUTION INTRAVENOUS at 05:11

## 2020-10-11 RX ADMIN — LEVETIRACETAM 1500 MG: 750 TABLET, FILM COATED ORAL at 19:59

## 2020-10-11 RX ADMIN — OMEPRAZOLE 40 MG: 20 CAPSULE, DELAYED RELEASE ORAL at 15:57

## 2020-10-11 RX ADMIN — ACETAMINOPHEN 650 MG: 325 TABLET, FILM COATED ORAL at 10:23

## 2020-10-11 RX ADMIN — LISINOPRIL 10 MG: 10 TABLET ORAL at 08:27

## 2020-10-11 RX ADMIN — CEFEPIME 2 G: 2 INJECTION, POWDER, FOR SOLUTION INTRAVENOUS at 08:22

## 2020-10-11 RX ADMIN — METHYLPHENIDATE HYDROCHLORIDE 5 MG: 5 TABLET ORAL at 12:52

## 2020-10-11 RX ADMIN — METRONIDAZOLE 500 MG: 500 INJECTION, SOLUTION INTRAVENOUS at 16:55

## 2020-10-11 RX ADMIN — METHOCARBAMOL 750 MG: 750 TABLET, FILM COATED ORAL at 20:00

## 2020-10-11 RX ADMIN — DEXAMETHASONE SODIUM PHOSPHATE 2 MG: 4 INJECTION, SOLUTION INTRAMUSCULAR; INTRAVENOUS at 08:35

## 2020-10-11 RX ADMIN — DIAZEPAM 5 MG: 5 TABLET ORAL at 11:27

## 2020-10-11 RX ADMIN — METRONIDAZOLE 500 MG: 500 INJECTION, SOLUTION INTRAVENOUS at 10:25

## 2020-10-11 RX ADMIN — METRONIDAZOLE 500 MG: 500 INJECTION, SOLUTION INTRAVENOUS at 22:53

## 2020-10-11 RX ADMIN — METRONIDAZOLE 500 MG: 500 INJECTION, SOLUTION INTRAVENOUS at 03:49

## 2020-10-11 ASSESSMENT — ACTIVITIES OF DAILY LIVING (ADL)
ADLS_ACUITY_SCORE: 20

## 2020-10-11 NOTE — PROGRESS NOTES
Neurosurgery progress note    S: LD reinforced, no csf leakage from cranial incision    O:  Temp:  [96.5  F (35.8  C)-98  F (36.7  C)] 97.2  F (36.2  C)  Pulse:  [] 90  Resp:  [16-18] 18  BP: (107-139)/() 116/82  SpO2:  [96 %-98 %] 96 %    Exam:  NEUROLOGIC:  -- Awake; Alert; oriented x 3  -- Follows commands briskly  -- +repetition, calculation, and naming  -- Speech fluent, spontaneous. No aphasia or dysarthria.  -- Mild L nasolabial flattening     Motor:  Normal bulk / tone; no tremor, rigidity, or bradykinesia.  No muscle wasting or fasciculations  No Pronator Drift  LUE and LLE 4/5 baseline per EMR    Cranio incision appears to be dry    Cultures NGTD    ASSESSMENT:  is a 50 year old man with a recent 9/23/2020 scheduled right craniotomy and surgical resection for ecurrent GBM with CSF leak from the incision. LD placed on 10/8 incision revised        RECOMMENDATIONS:  - LD placement, 10-15 ml/hr, possible 5 day of drainage (last day 10/12)  - Broad spectrm abx  - Follow up Blood, CSF culture    Isela Berger MD  Neurosurgery Resident     Please contact neurosurgery resident on call with questions.    Dial * * [phone], enter [phone] when prompted

## 2020-10-11 NOTE — PHARMACY-VANCOMYCIN DOSING SERVICE
Pharmacy Vancomycin Note  Date of Service 2020  Patient's  1970   50 year old, male    Indication: CNS infection  Goal Trough Level: 15-20 mg/L  Day of Therapy: 4  Current Vancomycin regimen:  2000 mg IV q12h    Current estimated CrCl = Estimated Creatinine Clearance: 166.9 mL/min (based on SCr of 0.69 mg/dL).    Creatinine for last 3 days  10/9/2020:  6:50 AM Creatinine 0.72 mg/dL  10/10/2020:  6:20 AM Creatinine 0.67 mg/dL  10/11/2020:  7:23 AM Creatinine 0.69 mg/dL    Recent Vancomycin Levels (past 3 days)  10/9/2020:  9:00 AM Vancomycin Level 32.5 mg/L;  4:08 PM Vancomycin Level 14.1 mg/L  10/11/2020:  4:08 PM Vancomycin Level 14.3 mg/L (11 hour trough)    Vancomycin IV Administrations (past 72 hours)                   vancomycin (VANCOCIN) 2,000 mg in sodium chloride 0.9 % 500 mL intermittent infusion (mg) 2,000 mg New Bag 10/11/20 1805     2,000 mg New Bag  0511     2,000 mg New Bag 10/10/20 1808     2,000 mg New Bag  0523     2,000 mg New Bag 10/09/20 1735    vancomycin (VANCOCIN) 1,750 mg in sodium chloride 0.9 % 500 mL intermittent infusion (mg) 1,750 mg New Bag 10/09/20 0301                Nephrotoxins and other renal medications (From now, onward)    Start     Dose/Rate Route Frequency Ordered Stop    10/09/20 1700  vancomycin (VANCOCIN) 2,000 mg in sodium chloride 0.9 % 500 mL intermittent infusion      2,000 mg  over 2 Hours Intravenous EVERY 12 HOURS 10/09/20 1655      10/08/20 0800  lisinopril (ZESTRIL) tablet 10 mg      10 mg Oral DAILY 10/07/20 2228               Contrast Orders - past 72 hours (72h ago, onward)    None          Interpretation of levels and current regimen:  Trough level is  Subtherapeutic    Has serum creatinine changed > 50% in last 72 hours: No    Urine output:  good urine output    Renal Function: Stable    Plan:  1.  Increase Dose to 2250 mg IV every 12 hours. Estimating this regimen to yield a serum vancomycin trough level ~16 mg/L. This regimen may help  determine if patient warrants a rechallenge with an every 8 hour regimen should vancomycin therapy be continued.   2.  Pharmacy will check trough levels as appropriate in 1-3 Days.    3. Serum creatinine levels will be ordered daily for the first week of therapy and at least twice weekly for subsequent weeks.      Kerri Gaines, PharmD, [facility]  Pager: [phone]        .

## 2020-10-11 NOTE — PLAN OF CARE
Status: Pt on 6A for CSF leak from head incision. Lumbar drain placed 10/8. R crani and surgical resection of recurrent GBM on 9/23.  Vitals: VSS on RA.  Neuros: A&Ox4. Slow to respond. Forgetful. Impulsive. Denies N/T. 5/5 all extremities.   IV: PIV TKO inbetween IV ABX.  Resp/trach: Stable on RA. LS clear all fields.  Diet: Regular diet.  Bowel status: BM x 1 this shift. BS active all quads.  : Voids w/ intermittent incontinence and urgency.  Skin: Back LD site w/ dressing, CDI. R crani incision w/ sutures, ANDREW.  Pain: Denies.  Activity: Up w/ 1 and GB.  Social: No calls or visitors this shift.  Plan: LD at shoulder drain 10-15 mL every 1 hour. Continue to monitor and follow POC.

## 2020-10-11 NOTE — PLAN OF CARE
Status: Pt on 6A for CSF leak from head incision. Lumbar drain placed 10/8. R crani and surgical resection of recurrent GBM on 9/23.  Vitals: VSS on RA.  Neuros: A&Ox4. Slow to respond. Forgetful. Impulsive. Denies N/T, N/V, double/blurry vision. 5/5 all extremities. Intermittent HA  IV: PIV TKO inbetween IV ABX.  Resp/trach: Stable on RA. LS clear all fields.  Diet: Regular diet.  Bowel status: BM x 2 loose this shift. BS active all quads.  : Voids w/ intermittent incontinence and urgency.  Skin: Back LD site w/ dressing, CDI. R crani incision w/ sutures, ANDREW.  Pain: Intermittent HA pain; PRN medications given  Activity: Up w/ 1 and GB.  Social: Wife at bedside, supportive with cares  Plan: LD at shoulder drain 10-15 mL every 1 hour. Continue to monitor and follow POC.

## 2020-10-12 ENCOUNTER — APPOINTMENT (OUTPATIENT)
Dept: CT IMAGING | Facility: CLINIC | Age: 50
DRG: 031 | End: 2020-10-12
Attending: STUDENT IN AN ORGANIZED HEALTH CARE EDUCATION/TRAINING PROGRAM
Payer: COMMERCIAL

## 2020-10-12 LAB
ALBUMIN SERPL-MCNC: 3 G/DL (ref 3.4–5)
ALP SERPL-CCNC: 46 U/L (ref 40–150)
ALT SERPL W P-5'-P-CCNC: 68 U/L (ref 0–70)
ANION GAP SERPL CALCULATED.3IONS-SCNC: 9 MMOL/L (ref 3–14)
APPEARANCE CSF: CLEAR
AST SERPL W P-5'-P-CCNC: 16 U/L (ref 0–45)
BILIRUB SERPL-MCNC: 0.8 MG/DL (ref 0.2–1.3)
BUN SERPL-MCNC: 18 MG/DL (ref 7–30)
CALCIUM SERPL-MCNC: 8.5 MG/DL (ref 8.5–10.1)
CHLORIDE SERPL-SCNC: 110 MMOL/L (ref 94–109)
CO2 SERPL-SCNC: 21 MMOL/L (ref 20–32)
COLOR CSF: YELLOW
CREAT SERPL-MCNC: 0.84 MG/DL (ref 0.66–1.25)
CRP SERPL-MCNC: 15 MG/L (ref 0–8)
ERYTHROCYTE [DISTWIDTH] IN BLOOD BY AUTOMATED COUNT: 13.9 % (ref 10–15)
GFR SERPL CREATININE-BSD FRML MDRD: >90 ML/MIN/{1.73_M2}
GLUCOSE CSF-MCNC: 63 MG/DL (ref 40–70)
GLUCOSE SERPL-MCNC: 122 MG/DL (ref 70–99)
GRAM STN SPEC: NORMAL
HCT VFR BLD AUTO: 34.2 % (ref 40–53)
HGB BLD-MCNC: 11.5 G/DL (ref 13.3–17.7)
LYMPH ABN NFR CSF MANUAL: 85 %
Lab: NORMAL
MCH RBC QN AUTO: 31.3 PG (ref 26.5–33)
MCHC RBC AUTO-ENTMCNC: 33.6 G/DL (ref 31.5–36.5)
MCV RBC AUTO: 93 FL (ref 78–100)
MONOS+MACROS NFR CSF MANUAL: 13 %
NEUTROPHILS NFR CSF MANUAL: 2 %
PLATELET # BLD AUTO: 179 10E9/L (ref 150–450)
POTASSIUM SERPL-SCNC: 3.4 MMOL/L (ref 3.4–5.3)
PROT CSF-MCNC: 153 MG/DL (ref 15–60)
PROT SERPL-MCNC: 6.1 G/DL (ref 6.8–8.8)
RBC # BLD AUTO: 3.67 10E12/L (ref 4.4–5.9)
RBC # CSF MANUAL: 157 /UL (ref 0–2)
SODIUM SERPL-SCNC: 141 MMOL/L (ref 133–144)
SPECIMEN SOURCE: NORMAL
TUBE # CSF: ABNORMAL #
WBC # BLD AUTO: 7.6 10E9/L (ref 4–11)
WBC # CSF MANUAL: 42 /UL (ref 0–5)

## 2020-10-12 PROCEDURE — 250N000011 HC RX IP 250 OP 636: Performed by: NEUROLOGICAL SURGERY

## 2020-10-12 PROCEDURE — 70450 CT HEAD/BRAIN W/O DYE: CPT | Mod: 26 | Performed by: RADIOLOGY

## 2020-10-12 PROCEDURE — 250N000011 HC RX IP 250 OP 636: Performed by: STUDENT IN AN ORGANIZED HEALTH CARE EDUCATION/TRAINING PROGRAM

## 2020-10-12 PROCEDURE — 87070 CULTURE OTHR SPECIMN AEROBIC: CPT | Performed by: STUDENT IN AN ORGANIZED HEALTH CARE EDUCATION/TRAINING PROGRAM

## 2020-10-12 PROCEDURE — 250N000013 HC RX MED GY IP 250 OP 250 PS 637: Performed by: NURSE PRACTITIONER

## 2020-10-12 PROCEDURE — 70450 CT HEAD/BRAIN W/O DYE: CPT

## 2020-10-12 PROCEDURE — 250N000013 HC RX MED GY IP 250 OP 250 PS 637: Performed by: STUDENT IN AN ORGANIZED HEALTH CARE EDUCATION/TRAINING PROGRAM

## 2020-10-12 PROCEDURE — 89051 BODY FLUID CELL COUNT: CPT | Performed by: STUDENT IN AN ORGANIZED HEALTH CARE EDUCATION/TRAINING PROGRAM

## 2020-10-12 PROCEDURE — 80053 COMPREHEN METABOLIC PANEL: CPT | Performed by: NEUROLOGICAL SURGERY

## 2020-10-12 PROCEDURE — 87205 SMEAR GRAM STAIN: CPT | Performed by: STUDENT IN AN ORGANIZED HEALTH CARE EDUCATION/TRAINING PROGRAM

## 2020-10-12 PROCEDURE — 87075 CULTR BACTERIA EXCEPT BLOOD: CPT | Performed by: STUDENT IN AN ORGANIZED HEALTH CARE EDUCATION/TRAINING PROGRAM

## 2020-10-12 PROCEDURE — 82945 GLUCOSE OTHER FLUID: CPT | Performed by: STUDENT IN AN ORGANIZED HEALTH CARE EDUCATION/TRAINING PROGRAM

## 2020-10-12 PROCEDURE — 84157 ASSAY OF PROTEIN OTHER: CPT | Performed by: STUDENT IN AN ORGANIZED HEALTH CARE EDUCATION/TRAINING PROGRAM

## 2020-10-12 PROCEDURE — 120N000002 HC R&B MED SURG/OB UMMC

## 2020-10-12 PROCEDURE — 999N000127 HC STATISTIC PERIPHERAL IV START W US GUIDANCE

## 2020-10-12 PROCEDURE — 258N000003 HC RX IP 258 OP 636: Performed by: NEUROLOGICAL SURGERY

## 2020-10-12 PROCEDURE — 86140 C-REACTIVE PROTEIN: CPT | Performed by: NEUROLOGICAL SURGERY

## 2020-10-12 PROCEDURE — 85027 COMPLETE CBC AUTOMATED: CPT | Performed by: NEUROLOGICAL SURGERY

## 2020-10-12 PROCEDURE — 36415 COLL VENOUS BLD VENIPUNCTURE: CPT | Performed by: NEUROLOGICAL SURGERY

## 2020-10-12 PROCEDURE — 87077 CULTURE AEROBIC IDENTIFY: CPT | Performed by: STUDENT IN AN ORGANIZED HEALTH CARE EDUCATION/TRAINING PROGRAM

## 2020-10-12 PROCEDURE — 87015 SPECIMEN INFECT AGNT CONCNTJ: CPT | Performed by: STUDENT IN AN ORGANIZED HEALTH CARE EDUCATION/TRAINING PROGRAM

## 2020-10-12 RX ADMIN — METHYLPHENIDATE HYDROCHLORIDE 5 MG: 5 TABLET ORAL at 07:29

## 2020-10-12 RX ADMIN — LEVETIRACETAM 1500 MG: 750 TABLET, FILM COATED ORAL at 20:04

## 2020-10-12 RX ADMIN — OXYCODONE HYDROCHLORIDE 10 MG: 5 TABLET ORAL at 10:20

## 2020-10-12 RX ADMIN — METHOCARBAMOL 750 MG: 750 TABLET, FILM COATED ORAL at 12:24

## 2020-10-12 RX ADMIN — CEFEPIME 2 G: 2 INJECTION, POWDER, FOR SOLUTION INTRAVENOUS at 00:03

## 2020-10-12 RX ADMIN — LISINOPRIL 10 MG: 10 TABLET ORAL at 07:24

## 2020-10-12 RX ADMIN — ACETAMINOPHEN 650 MG: 325 TABLET, FILM COATED ORAL at 07:35

## 2020-10-12 RX ADMIN — LEVETIRACETAM 1500 MG: 750 TABLET, FILM COATED ORAL at 07:24

## 2020-10-12 RX ADMIN — VANCOMYCIN HYDROCHLORIDE 2250 MG: 10 INJECTION, POWDER, LYOPHILIZED, FOR SOLUTION INTRAVENOUS at 05:33

## 2020-10-12 RX ADMIN — DIAZEPAM 5 MG: 5 TABLET ORAL at 04:12

## 2020-10-12 RX ADMIN — ACETAMINOPHEN 650 MG: 325 TABLET, FILM COATED ORAL at 12:09

## 2020-10-12 RX ADMIN — METRONIDAZOLE 500 MG: 500 INJECTION, SOLUTION INTRAVENOUS at 16:44

## 2020-10-12 RX ADMIN — METHYLPHENIDATE HYDROCHLORIDE 5 MG: 5 TABLET ORAL at 12:24

## 2020-10-12 RX ADMIN — METRONIDAZOLE 500 MG: 500 INJECTION, SOLUTION INTRAVENOUS at 21:53

## 2020-10-12 RX ADMIN — VANCOMYCIN HYDROCHLORIDE 2250 MG: 10 INJECTION, POWDER, LYOPHILIZED, FOR SOLUTION INTRAVENOUS at 17:50

## 2020-10-12 RX ADMIN — ACETAMINOPHEN 650 MG: 325 TABLET, FILM COATED ORAL at 00:34

## 2020-10-12 RX ADMIN — METRONIDAZOLE 500 MG: 500 INJECTION, SOLUTION INTRAVENOUS at 10:18

## 2020-10-12 RX ADMIN — CEFEPIME 2 G: 2 INJECTION, POWDER, FOR SOLUTION INTRAVENOUS at 09:11

## 2020-10-12 RX ADMIN — ESCITALOPRAM 10 MG: 10 TABLET, FILM COATED ORAL at 07:24

## 2020-10-12 RX ADMIN — ONDANSETRON 4 MG: 4 TABLET, ORALLY DISINTEGRATING ORAL at 12:24

## 2020-10-12 RX ADMIN — OMEPRAZOLE 40 MG: 20 CAPSULE, DELAYED RELEASE ORAL at 07:24

## 2020-10-12 RX ADMIN — METHOCARBAMOL 750 MG: 750 TABLET, FILM COATED ORAL at 07:24

## 2020-10-12 RX ADMIN — DIAZEPAM 5 MG: 5 TABLET ORAL at 14:37

## 2020-10-12 RX ADMIN — DOCUSATE SODIUM 50 MG AND SENNOSIDES 8.6 MG 1 TABLET: 8.6; 5 TABLET, FILM COATED ORAL at 07:24

## 2020-10-12 RX ADMIN — PROCHLORPERAZINE MALEATE 10 MG: 10 TABLET ORAL at 09:10

## 2020-10-12 RX ADMIN — CEFEPIME 2 G: 2 INJECTION, POWDER, FOR SOLUTION INTRAVENOUS at 16:01

## 2020-10-12 RX ADMIN — METHOCARBAMOL 750 MG: 750 TABLET, FILM COATED ORAL at 20:04

## 2020-10-12 RX ADMIN — OMEPRAZOLE 40 MG: 20 CAPSULE, DELAYED RELEASE ORAL at 16:44

## 2020-10-12 RX ADMIN — METHOCARBAMOL 750 MG: 750 TABLET, FILM COATED ORAL at 16:44

## 2020-10-12 RX ADMIN — DEXAMETHASONE SODIUM PHOSPHATE 2 MG: 4 INJECTION, SOLUTION INTRAMUSCULAR; INTRAVENOUS at 07:24

## 2020-10-12 RX ADMIN — METRONIDAZOLE 500 MG: 500 INJECTION, SOLUTION INTRAVENOUS at 04:11

## 2020-10-12 ASSESSMENT — ACTIVITIES OF DAILY LIVING (ADL)
ADLS_ACUITY_SCORE: 19
ADLS_ACUITY_SCORE: 20
ADLS_ACUITY_SCORE: 20
ADLS_ACUITY_SCORE: 19
ADLS_ACUITY_SCORE: 20
ADLS_ACUITY_SCORE: 19

## 2020-10-12 ASSESSMENT — VISUAL ACUITY
OU: BASELINE
OU: BASELINE

## 2020-10-12 NOTE — PLAN OF CARE
Status: Pt on 6A for CSF leak from head incision. Lumbar drain placed 10/8. R crani and surgical resection of recurrent GBM on 9/23.  Vitals: Stable on RA.    Neuros: A&O x4. Slow to respond. Impulsive. Otherwise intact.    LD: Titrating 10-15 ml/hr, clear/yellow drainage.  IV: Extravasation to L forearm from vanco; 24 hour check to be completed this evening. R PIV running at TKO between abx.   Diet: Regular diet. Denies nausea.    Bowel status: +BS. No BM this shift. Loose BM's yesterday.   : Voiding spontaneously.    Skin: Back LD site w/ dressing, CDI. R crani incision sutured ANDREW, no drainage.   Pain: Pt c/o HA and back pain controlled with Tylenol and PRN valium.   Activity: SBA GB. Impulsive at times.   Plan: Continue to monitor and follow current POC.

## 2020-10-12 NOTE — PLAN OF CARE
Status: returned from ARU to 6A for CSF leak from head incision. Lumbar drain placed 10/8. R crani and surgical resection of recurrent GBM on 9/23.  Vitals: VSS  Neuros: flat affect, generalized weakness, intermittent HA and nausea. Pt can be impulsive  IV: PIV at tko between abx, old extravasation site marked, re-image due at 2000 tonight  Resp/trach: WNL, LSC  Diet: tolerating regular diet without difficulty. Coughed/nauseated mildly after pills with water, prefers to take pills with sprite, flawless swallow after sprite started.  Bowel status: BM yesterday, loose, holding bowel meds  : voiding without difficulty, continent  Skin: head incision sutured, ANDREW and intact  Pain: managed effectively with prn oxycodone, valium, and tylenol  Activity: up with SBA, GB and RN  Social: wife at bedside, supportive and updated  Plan: LD to remain titrating 10-15 ml/hr until clamping tomorrow, then home possibly on Wednesday 10/14

## 2020-10-12 NOTE — PROGRESS NOTES
Neurosurgery progress note    S: no csf leakage from cranial incision, had to decrease LD output due to persistent headache    O:  Temp:  [96.6  F (35.9  C)-98  F (36.7  C)] 96.9  F (36.1  C)  Pulse:  [] 90  Resp:  [14-18] 16  BP: (105-119)/(72-83) 119/83  SpO2:  [96 %-98 %] 96 %    Exam:  NEUROLOGIC:  -- Awake; Alert; oriented x 3  -- Follows commands briskly  -- +repetition, calculation, and naming  -- Speech fluent, spontaneous. No aphasia or dysarthria.  -- Mild L nasolabial flattening     Motor:  Normal bulk / tone; no tremor, rigidity, or bradykinesia.  No muscle wasting or fasciculations  No Pronator Drift  LUE and LLE 4/5 baseline per EMR    Cranio incision appears to be dry    Cultures NGTD    ASSESSMENT:  is a 50 year old man with a recent 9/23/2020 scheduled right craniotomy and surgical resection for ecurrent GBM with CSF leak from the incision. LD placed on 10/8 incision revised        RECOMMENDATIONS:  - LD placement, 10 ml/hr, possible 5 day of drainage (last day 10/12)  - Broad spectrm abx  - Follow up Blood, CSF culture    Mattie Zaragoza MD  Neurosurgery Resident     Please contact neurosurgery resident on call with questions.    Dial * * [phone], enter [phone] when prompted

## 2020-10-12 NOTE — PLAN OF CARE
Status: Pt on 6A for CSF leak from head incision. Lumbar drain placed 10/8. R crani and surgical resection of recurrent GBM on 9/23.  Vitals: VSS, on ra   Neuros: Oriented x4. Slow to respond to commands. Impulsive. Otherwise intact.   LD: titrating 10-15 ml/hr out, clear yellow drainage. Around shoulder height.   IV: PIV on L forearm removed d/t extravasation of Vancomycin this evening. New PIV on R forearm infusing   Diet: Regular diet. No c/o nausea   Bowel status: +bs, on episode of loose stool this evening.   : voiding spontaneously   Skin: Back LD site w/ dressing, CDI. R crani incision w/ sutures, ANDREW.  Pain: c/o HA, tylenol, scheduled robaxin, and PRN valium given  Activity: SBA w/ gb. Impulsive w/ movement even w/ LD education reinforced. On BA.   Social: Wife at bedside earlier, supportive   Plan: Continue w/ POC.

## 2020-10-13 ENCOUNTER — APPOINTMENT (OUTPATIENT)
Dept: PHYSICAL THERAPY | Facility: CLINIC | Age: 50
DRG: 031 | End: 2020-10-13
Attending: NURSE PRACTITIONER
Payer: COMMERCIAL

## 2020-10-13 ENCOUNTER — APPOINTMENT (OUTPATIENT)
Dept: OCCUPATIONAL THERAPY | Facility: CLINIC | Age: 50
DRG: 031 | End: 2020-10-13
Attending: NURSE PRACTITIONER
Payer: COMMERCIAL

## 2020-10-13 ENCOUNTER — APPOINTMENT (OUTPATIENT)
Dept: CT IMAGING | Facility: CLINIC | Age: 50
DRG: 031 | End: 2020-10-13
Attending: STUDENT IN AN ORGANIZED HEALTH CARE EDUCATION/TRAINING PROGRAM
Payer: COMMERCIAL

## 2020-10-13 LAB
ALBUMIN SERPL-MCNC: 2.6 G/DL (ref 3.4–5)
ALP SERPL-CCNC: 41 U/L (ref 40–150)
ALT SERPL W P-5'-P-CCNC: 59 U/L (ref 0–70)
ANION GAP SERPL CALCULATED.3IONS-SCNC: 8 MMOL/L (ref 3–14)
AST SERPL W P-5'-P-CCNC: 14 U/L (ref 0–45)
BACTERIA SPEC CULT: NO GROWTH
BILIRUB SERPL-MCNC: 0.5 MG/DL (ref 0.2–1.3)
BUN SERPL-MCNC: 17 MG/DL (ref 7–30)
CALCIUM SERPL-MCNC: 8.3 MG/DL (ref 8.5–10.1)
CHLORIDE SERPL-SCNC: 113 MMOL/L (ref 94–109)
CO2 SERPL-SCNC: 22 MMOL/L (ref 20–32)
CREAT SERPL-MCNC: 0.75 MG/DL (ref 0.66–1.25)
CRP SERPL-MCNC: 9.3 MG/L (ref 0–8)
ERYTHROCYTE [DISTWIDTH] IN BLOOD BY AUTOMATED COUNT: 13.9 % (ref 10–15)
GFR SERPL CREATININE-BSD FRML MDRD: >90 ML/MIN/{1.73_M2}
GLUCOSE SERPL-MCNC: 112 MG/DL (ref 70–99)
HCT VFR BLD AUTO: 31.8 % (ref 40–53)
HGB BLD-MCNC: 10.7 G/DL (ref 13.3–17.7)
MCH RBC QN AUTO: 30.7 PG (ref 26.5–33)
MCHC RBC AUTO-ENTMCNC: 33.6 G/DL (ref 31.5–36.5)
MCV RBC AUTO: 91 FL (ref 78–100)
PLATELET # BLD AUTO: 142 10E9/L (ref 150–450)
POTASSIUM SERPL-SCNC: 3.3 MMOL/L (ref 3.4–5.3)
PROT SERPL-MCNC: 5.6 G/DL (ref 6.8–8.8)
RBC # BLD AUTO: 3.48 10E12/L (ref 4.4–5.9)
SODIUM SERPL-SCNC: 142 MMOL/L (ref 133–144)
SPECIMEN SOURCE: NORMAL
VANCOMYCIN SERPL-MCNC: 16.7 MG/L
WBC # BLD AUTO: 6.5 10E9/L (ref 4–11)

## 2020-10-13 PROCEDURE — 250N000013 HC RX MED GY IP 250 OP 250 PS 637: Performed by: NURSE PRACTITIONER

## 2020-10-13 PROCEDURE — 250N000013 HC RX MED GY IP 250 OP 250 PS 637: Performed by: STUDENT IN AN ORGANIZED HEALTH CARE EDUCATION/TRAINING PROGRAM

## 2020-10-13 PROCEDURE — 97162 PT EVAL MOD COMPLEX 30 MIN: CPT | Mod: GP

## 2020-10-13 PROCEDURE — 258N000003 HC RX IP 258 OP 636: Performed by: NEUROLOGICAL SURGERY

## 2020-10-13 PROCEDURE — 80202 ASSAY OF VANCOMYCIN: CPT | Performed by: NEUROLOGICAL SURGERY

## 2020-10-13 PROCEDURE — 72131 CT LUMBAR SPINE W/O DYE: CPT

## 2020-10-13 PROCEDURE — 80053 COMPREHEN METABOLIC PANEL: CPT | Performed by: NEUROLOGICAL SURGERY

## 2020-10-13 PROCEDURE — 99253 IP/OBS CNSLTJ NEW/EST LOW 45: CPT | Mod: GC | Performed by: PHYSICAL MEDICINE & REHABILITATION

## 2020-10-13 PROCEDURE — 97535 SELF CARE MNGMENT TRAINING: CPT | Mod: GO | Performed by: OCCUPATIONAL THERAPIST

## 2020-10-13 PROCEDURE — 250N000011 HC RX IP 250 OP 636: Performed by: NEUROLOGICAL SURGERY

## 2020-10-13 PROCEDURE — 250N000011 HC RX IP 250 OP 636: Performed by: STUDENT IN AN ORGANIZED HEALTH CARE EDUCATION/TRAINING PROGRAM

## 2020-10-13 PROCEDURE — 72128 CT CHEST SPINE W/O DYE: CPT | Mod: 26 | Performed by: RADIOLOGY

## 2020-10-13 PROCEDURE — 97530 THERAPEUTIC ACTIVITIES: CPT | Mod: GP

## 2020-10-13 PROCEDURE — 36415 COLL VENOUS BLD VENIPUNCTURE: CPT | Performed by: NEUROLOGICAL SURGERY

## 2020-10-13 PROCEDURE — 85027 COMPLETE CBC AUTOMATED: CPT | Performed by: NEUROLOGICAL SURGERY

## 2020-10-13 PROCEDURE — 72128 CT CHEST SPINE W/O DYE: CPT

## 2020-10-13 PROCEDURE — 72131 CT LUMBAR SPINE W/O DYE: CPT | Mod: 26 | Performed by: RADIOLOGY

## 2020-10-13 PROCEDURE — 120N000002 HC R&B MED SURG/OB UMMC

## 2020-10-13 PROCEDURE — 97116 GAIT TRAINING THERAPY: CPT | Mod: GP

## 2020-10-13 PROCEDURE — 86140 C-REACTIVE PROTEIN: CPT | Performed by: NEUROLOGICAL SURGERY

## 2020-10-13 PROCEDURE — 97165 OT EVAL LOW COMPLEX 30 MIN: CPT | Mod: GO | Performed by: OCCUPATIONAL THERAPIST

## 2020-10-13 RX ORDER — POTASSIUM CHLORIDE 1.5 G/1.58G
20-40 POWDER, FOR SOLUTION ORAL
Status: DISCONTINUED | OUTPATIENT
Start: 2020-10-13 | End: 2020-10-15

## 2020-10-13 RX ORDER — POTASSIUM CHLORIDE 7.45 MG/ML
10 INJECTION INTRAVENOUS
Status: DISCONTINUED | OUTPATIENT
Start: 2020-10-13 | End: 2020-10-15

## 2020-10-13 RX ORDER — POTASSIUM CL/LIDO/0.9 % NACL 10MEQ/0.1L
10 INTRAVENOUS SOLUTION, PIGGYBACK (ML) INTRAVENOUS
Status: DISCONTINUED | OUTPATIENT
Start: 2020-10-13 | End: 2020-10-15

## 2020-10-13 RX ORDER — POTASSIUM CHLORIDE 29.8 MG/ML
20 INJECTION INTRAVENOUS
Status: DISCONTINUED | OUTPATIENT
Start: 2020-10-13 | End: 2020-10-15

## 2020-10-13 RX ORDER — POTASSIUM CHLORIDE 750 MG/1
20-40 TABLET, EXTENDED RELEASE ORAL
Status: DISCONTINUED | OUTPATIENT
Start: 2020-10-13 | End: 2020-10-15

## 2020-10-13 RX ADMIN — CEFEPIME 2 G: 2 INJECTION, POWDER, FOR SOLUTION INTRAVENOUS at 08:33

## 2020-10-13 RX ADMIN — POTASSIUM CHLORIDE 20 MEQ: 750 TABLET, EXTENDED RELEASE ORAL at 16:07

## 2020-10-13 RX ADMIN — OMEPRAZOLE 40 MG: 20 CAPSULE, DELAYED RELEASE ORAL at 08:36

## 2020-10-13 RX ADMIN — PROCHLORPERAZINE MALEATE 10 MG: 10 TABLET ORAL at 10:30

## 2020-10-13 RX ADMIN — CEFEPIME 2 G: 2 INJECTION, POWDER, FOR SOLUTION INTRAVENOUS at 16:02

## 2020-10-13 RX ADMIN — DIAZEPAM 5 MG: 5 TABLET ORAL at 13:57

## 2020-10-13 RX ADMIN — METRONIDAZOLE 500 MG: 500 INJECTION, SOLUTION INTRAVENOUS at 04:11

## 2020-10-13 RX ADMIN — METHYLPHENIDATE HYDROCHLORIDE 5 MG: 5 TABLET ORAL at 08:43

## 2020-10-13 RX ADMIN — ACETAMINOPHEN 650 MG: 325 TABLET, FILM COATED ORAL at 10:18

## 2020-10-13 RX ADMIN — DIAZEPAM 5 MG: 5 TABLET ORAL at 21:44

## 2020-10-13 RX ADMIN — METRONIDAZOLE 500 MG: 500 INJECTION, SOLUTION INTRAVENOUS at 10:18

## 2020-10-13 RX ADMIN — VANCOMYCIN HYDROCHLORIDE 2250 MG: 10 INJECTION, POWDER, LYOPHILIZED, FOR SOLUTION INTRAVENOUS at 18:23

## 2020-10-13 RX ADMIN — Medication 1 MG: at 20:01

## 2020-10-13 RX ADMIN — ACETAMINOPHEN 650 MG: 325 TABLET, FILM COATED ORAL at 17:31

## 2020-10-13 RX ADMIN — METHYLPHENIDATE HYDROCHLORIDE 5 MG: 5 TABLET ORAL at 12:22

## 2020-10-13 RX ADMIN — ESCITALOPRAM 10 MG: 10 TABLET, FILM COATED ORAL at 08:36

## 2020-10-13 RX ADMIN — Medication 0.2 MG: at 01:02

## 2020-10-13 RX ADMIN — METRONIDAZOLE 500 MG: 500 INJECTION, SOLUTION INTRAVENOUS at 17:27

## 2020-10-13 RX ADMIN — LEVETIRACETAM 1500 MG: 750 TABLET, FILM COATED ORAL at 08:37

## 2020-10-13 RX ADMIN — PROCHLORPERAZINE MALEATE 10 MG: 10 TABLET ORAL at 10:36

## 2020-10-13 RX ADMIN — VANCOMYCIN HYDROCHLORIDE 2250 MG: 10 INJECTION, POWDER, LYOPHILIZED, FOR SOLUTION INTRAVENOUS at 05:45

## 2020-10-13 RX ADMIN — OMEPRAZOLE 40 MG: 20 CAPSULE, DELAYED RELEASE ORAL at 16:02

## 2020-10-13 RX ADMIN — LEVETIRACETAM 1500 MG: 750 TABLET, FILM COATED ORAL at 20:01

## 2020-10-13 RX ADMIN — LISINOPRIL 10 MG: 10 TABLET ORAL at 08:36

## 2020-10-13 RX ADMIN — CEFEPIME 2 G: 2 INJECTION, POWDER, FOR SOLUTION INTRAVENOUS at 00:00

## 2020-10-13 RX ADMIN — METHOCARBAMOL 750 MG: 750 TABLET, FILM COATED ORAL at 20:01

## 2020-10-13 RX ADMIN — POTASSIUM CHLORIDE 40 MEQ: 750 TABLET, EXTENDED RELEASE ORAL at 13:57

## 2020-10-13 RX ADMIN — METHOCARBAMOL 750 MG: 750 TABLET, FILM COATED ORAL at 16:02

## 2020-10-13 RX ADMIN — METHOCARBAMOL 750 MG: 750 TABLET, FILM COATED ORAL at 08:37

## 2020-10-13 RX ADMIN — CEFEPIME 2 G: 2 INJECTION, POWDER, FOR SOLUTION INTRAVENOUS at 23:58

## 2020-10-13 RX ADMIN — METRONIDAZOLE 500 MG: 500 INJECTION, SOLUTION INTRAVENOUS at 22:44

## 2020-10-13 RX ADMIN — DEXAMETHASONE SODIUM PHOSPHATE 2 MG: 4 INJECTION, SOLUTION INTRAMUSCULAR; INTRAVENOUS at 08:33

## 2020-10-13 RX ADMIN — METHOCARBAMOL 750 MG: 750 TABLET, FILM COATED ORAL at 12:22

## 2020-10-13 ASSESSMENT — ACTIVITIES OF DAILY LIVING (ADL)
ADLS_ACUITY_SCORE: 20
ADLS_ACUITY_SCORE: 15
ADLS_ACUITY_SCORE: 20
ADLS_ACUITY_SCORE: 20
ADLS_ACUITY_SCORE: 15
ADLS_ACUITY_SCORE: 20

## 2020-10-13 NOTE — PROGRESS NOTES
"   10/13/20 1218   Quick Adds   Type of Visit Initial PT Evaluation   Living Environment   People in home spouse   Current Living Arrangements house   Home Accessibility stairs to enter home;stairs within home   Number of Stairs, Main Entrance 2   Stair Railings, Main Entrance none   Number of Stairs, Within Home, Primary other (see comments)  (1 flight of stairs to basement)   Transportation Anticipated family or friend will provide;car, drives self   Living Environment Comments Pt driving prior to previous admission. Pt does not need to go to basement as all needs can be met on main level.    Self-Care   Usual Activity Tolerance good   Current Activity Tolerance good   Regular Exercise Yes   Exercise Amount/Frequency   (Came from ARU)   Equipment Currently Used at Home none   Activity/Exercise/Self-Care Comment Pt has GB at shower and toilet, has SEC and walker.    Disability/Function   Wear Glasses or Blind yes   Concentrating, Remembering or Making Decisions Difficulty no   Difficulty Communicating no   Difficulty Eating/Swallowing no   Walking or Climbing Stairs Difficulty no   Dressing/Bathing Difficulty no   Toileting no   Doing Errands Independently Difficulty (such as shopping) no   Fall history within last six months no   Change in Functional Status Since Onset of Current Illness/Injury yes   General Information   Onset of Illness/Injury or Date of Surgery 10/07/20   Referring Physician Shalini Sampson APRN CNP   Pertinent History of Current Problem (include personal factors and/or comorbidities that impact the POC) Per chart: \"50 year old man with a recent 9/23/2020 scheduled right craniotomy and surgical resection for ecurrent GBM with CSF leak from the incision. LD placed on 10/8 incision revised. LD removed on 10/13.\"   Existing Precautions/Restrictions other (see comments)  (craniotomy)   General Observations Activity: up with assist   Cognition   Orientation Status (Cognition) oriented " x 4   Affect/Mental Status (Cognition) WFL   Follows Commands (Cognition) WFL   Cognitive Status Comments Delayed processing; flat affect   Integumentary/Edema   Integumentary/Edema Comments Craniotomy incision   Posture    Posture Forward head position;Protracted shoulders   Range of Motion (ROM)   ROM Comment WFL   Strength   Strength Comments L LE/UE mild discrepancy in muscle strength compared to R UE/LE   Bed Mobility   Comment (Bed Mobility) Supine > EOB, SBA - impulsive   Transfers   Transfer Safety Comments SBA   Gait/Stairs (Locomotion)   St[place] Level (Gait) supervision   Comment (Gait/Stairs) Mild imbalances demo'd during stairs and gait   Balance   Balance Comments Impaired higher level balance deficits   Clinical Impression   Criteria for Skilled Therapeutic Intervention yes, treatment indicated   PT Diagnosis (PT) Impaired functional mobility   Influenced by the following impairments balance, L visual neglect, impulsivity, delayed processing   Functional limitations due to impairments supervision needed   Clinical Presentation Stable/Uncomplicated   Clinical Presentation Rationale clinical judgement and chart review   Clinical Decision Making (Complexity) moderate complexity   Therapy Frequency (PT) Daily   Predicted Duration of Therapy Intervention (days/wks) days   Planned Therapy Interventions (PT) balance training;gait training;patient/family education   Risk & Benefits of therapy have been explained patient;spouse/significant other   PT Discharge Planning    PT Discharge Recommendation (DC Rec) home with assist;home with home care physical therapy   PT Rationale for DC Rec Pt is safe to return home. would benefit from further skille therapy to address cognition, ADLs, balance, and progression of safe functional mobility. SO, friends and family will be able to cooridnate 24/7 supervision.    PT Brief overview of current status  Nursing: SBA (ok for pt to ambulate with SO)   [facility]  "AM-PAC TM \"6 Clicks\"   2016, Trustees of [facility], under license to [employer].  All rights reserved.   6 Clicks Short Forms Basic Mobility Inpatient Short Form   [facility]-PAC  \"6 Clicks\" V.2 Basic Mobility Inpatient Short Form   1. Turning from your back to your side while in a flat bed without using bedrails? 4 - None   2. Moving from lying on your back to sitting on the side of a flat bed without using bedrails? 4 - None   3. Moving to and from a bed to a chair (including a wheelchair)? 3 - A Little   4. Standing up from a chair using your arms (e.g., wheelchair, or bedside chair)? 3 - A Little   5. To walk in hospital room? 3 - A Little   6. Climbing 3-5 steps with a railing? 3 - A Little   Basic Mobility Raw Score (Score out of 24.Lower scores equate to lower levels of function) 20   Total Evaluation Time   Total Evaluation Time (Minutes) 8     "

## 2020-10-13 NOTE — PLAN OF CARE
Status: Pt on 6A for CSF leak from head incision. R crani and surgical resection of recurrent GBM on 9/23. Lumbar drain placed 10/8.  Vitals: VSS on RA.   Neuros: A&O x4 this shift. Flat affect, slow to respond. Forgetful and impulsive at times. Denies N/T and vision changes. Strengths 5/5. Intermittent HA and nausea, improving.  IV: PIV TKO between antibiotics.   Resp/trach: LS clear. Denies SOB.   Diet: Regular, fair intake. Pills with Sprite or ginger ale.   Bowel status: BS+. Loose BMs lately, hold BM meds.  : Incontinent of urine, which is not baseline for patient and team is aware. Urgency.   Skin: Head incisions ANDREW, WDL. Bruising throughout. LD dressing, CDI. Titrate LD to 10-15 mL/hr.   Pain: Mild HA and neck pain, declined pain meds. Scheduled Robaxin given.   Activity: Assist of one and GB. Pt impulsive, both bed and chair alarms need to be on at all times. Pt needs reminders to call for help. Pt likes to reposition frequently.  Social: Wife at bedside at start of shift, supportive with cares.   Plan: Clamp LD tomorrow, then possible discharge Wednesday (10/14). Continue to monitor and follow plan of care.

## 2020-10-13 NOTE — PROGRESS NOTES
Care Management Follow Up Note    Length of Stay (days) 6    Patient plan of care discussed at Interdisciplinary Rounds: yes  Expected Discharge Date: 10/14/20  Concerns to be Addressed:    Disposition planning     Anticipated Discharge Disposition:   To be determined.  OT, Physical Therapy and  PMR evals ordered today.  Anticipated Discharge Services:  To be determined  Anticipated Discharge DME:      Plan:  SW attended am rounds today and learned from Shalini Sampson NP, that pt was admitted from a ARU.   Per chart review,  Pt had a previous SW assessment completed on 9/25/2020 and was placed at [facility] on 9/25/2020.  Current nursing notes indicate that pt is oriented x4, impulsive and up with 1 and a gait belt.   Shalini indicates that she will place OT/Physical therapy and PMR eval orders. JENNIFER will await recommendations.    JAZMYN Mcduffie  Social Work, 6A  Phone:  [phone]  Pager:  [phone]  10/13/2020        SOLEDAD Stone

## 2020-10-13 NOTE — PROGRESS NOTES
Care Management Follow Up Note    Length of Stay (days) 6    Patient plan of care discussed at Interdisciplinary Rounds: Yes  Expected Discharge Date: 10/14/20  Concerns to be Addressed:         Anticipated Discharge Disposition:  Home  Anticipated Discharge Services:   The 6A RN Care Coordinator will determine whether or not skilled home care services are indicated.   Anticipated Discharge DME:      Plan:  JENNIFER spoke with OT (Grant) who states that return to home is recommended.  Grant states that when pt's wife return's to work, pt's mother will be available to assist pt as needed.  JENNIFER reviewed 10/13/2020, Physical Therapy eval and Physical Therapy also recommends discharge to home.    SOLEDAD Stone

## 2020-10-13 NOTE — CONSULTS
[facility]   PM&R Inpatient Consultation  _______________________________________________________  Patient: Gaetano Holley   : 1970   MRN: [id number]   _______________________________________________________    DATE OF ENCOUNTER:  10/13/2020   REFERRING PROVIDER:  Shalini Sampson CNP    REASON FOR CONSULT:  Evaluate rehabilitation needs and appropriateness for acute rehabilitation.    ASSESSMENT:    Luis Holley is a 50 year old man with a PMH of right recurrent frontal glioblastoma s/p resection c/b cranial CSF leak with no evidence of hydrocephalus, resulting in a decline of cognitive status and awareness.  His symptoms improved with lumbar drain placement, which has since been discontinued, and he is currently cognitively stable.  He was close to being able to discharge home from his prior ARU stay in [facility] prior to decompensation.    Patient has these current deficits: Mild cognitive impairment requiring cuing with initiation, left arm weakness and fine motor movement deficits  Potential inpatient rehabilitation diagnoses include: Debility related to craniotomy    RECOMMENDATIONS:    # Rehabilitation Recommendations:   - Final disposition recommendation is pending PT and OT evaluation  - OT to perform MoCA or SLUMS to assess cognitive deficits  - Recommend changing / weaning all IV medications to oral prior to discharge  - If unable to discontinue IV, he will need 24 hour supervision at least  - If able to discontinue IV, we will review PT and OT evals.  If he is not at increased risk for falls due to worsened cognitive or physical function, he will be safe to discharge home with home based therapies and intermittent supervision.  - If he is at increased risk for falls or has significant functional decline from his ARU stay, consider another short ARU course.    We will continue to follow along with you to provide further recommendations.    HISTORY OF PRESENT  ILLNESS:    Gaetano Holley is a 50 year old male with a PMH of right recurrent frontal glioblastoma s/p right craniotomy for right frontal tumor resection on 9/23.  PM&R was consulted for acute rehab admission on 9/28/20; please see note for details.  He was discharged to [facility] and was participatory in therapies.  During his acute rehab stay, he was noted to have decreased alertness, impaired cognition, and eye pain.  Further inspection showed leaking from the right craniotomy wound worsened during PT.  On 10/7/20, he was brought to the ED and admitted for CSF leak, verified on head CT.  He was started on broad spectrum antibiotics and a lumbar drain was placed on 10/8.  The lumbar drain broke on 10/13 when it was planned to be clamped prior to removal.  It was removed with surgical closure of the wound.    For the past couple of days, he has been feeling closer to normal, with improved wakefulness during the day.  He has a 7-8/10 headache starting two days ago, radiating from the back of his head to the front, with mild eye pressure.  This evolved from severe eye pressure and a frontal headache that started after his surgery.  It is exacerbated by reading his phone for more than 5-10 minutes and mildly worsening with bright light and loud sound.  Ice packs and resting reduce the headache.    He feels like his left hand is still clumsy and mildly weak, but not more so than at [facility].  He denies new numbness.  He says his neck is stiff and tender, but he is still able to move his neck as needed.  He denies worsening headache or dizziness with changes in position.      # Current Function:   - PT:  Pending  - OT:  Pending  - SLP:  Pending    # Prior Functional History:  Per the consult note from the previous discharge:  - Activities of daily living:  Decreased fine motor control to left hand, limited sustained attention  - Cognition:    Impulsive  - Mobility:    Ambulates >1000ft with CGA-min  Assist Ambulates to toilet with close SBA-CGA.  - Assistive devices:  None  - Handedness:   Right     Per patient report of usual state of health 2 months ago:  He was completely independent in all ADLs and mobility, with reduced energy and symptoms of mild cognitive impairment that were progressive in the two months prior to first admission.    Social History:  - Abode:  Lives in a ranch style home. 2 steps to enter with no railing.    - Living situation:  Lives with wife.  Everything is available on single level.  - Family support:  Wife works during the day and has exhausted PTO and family leave.  They would be able to have his mother live with them for 24 hour supervision if needed, but this is not ideal.  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  He was working with accommodations for reduced load 2 months prior to admission.  _________________________________________________  Past Medical History:  Past Medical History:   Diagnosis Date     Abdominal pain 2008    unspecified      Acute cholecystitis 07/01/2008     Depressive disorder 1998     Erectile dysfunction 2008     Hypertension      Impaired fasting glucose 12/12/2008     Iron deficiency anemia      Seizures (H)      Family History:  History reviewed. No pertinent family history.   Medications:  Current Outpatient Medications   Medication Instructions     ciclopirox (LOPROX) 0.77 % cream Topical, PRN     dexamethasone (DECADRON) 3 mg, Oral, EVERY 8 HOURS     dexamethasone (DECADRON) 2 mg, Oral, EVERY 8 HOURS     dexamethasone (DECADRON) 1 mg, Oral, EVERY 8 HOURS     dexamethasone (DECADRON) 3 mg, Oral, EVERY 8 HOURS     escitalopram (LEXAPRO) 10 mg, Oral, DAILY     fluticasone (FLONASE) 50 MCG/ACT nasal spray 50 sprays, Both Nostrils, DAILY     heparin ANTICOAGULANT 5,000 Units, Subcutaneous, EVERY 8 HOURS     levETIRAcetam (KEPPRA) 750 mg, Oral, 2 TIMES DAILY      lisinopril (ZESTRIL) 10 mg, Oral, DAILY     methylphenidate (RITALIN) 5 mg, Oral, 2 TIMES DAILY, Pt takes ritalin x1 in AM and then 4hrs afterwards     omeprazole (PRILOSEC) 20 MG DR capsule TAKE 2 CAPSULES BY MOUTH ONCE DAILY BEFORE MEAL(S) -  DO  NOT  CRUSH     ondansetron (ZOFRAN) 8 mg, Oral, DAILY     oxyCODONE (ROXICODONE) 5-10 mg, Oral, EVERY 3 HOURS PRN     polyethylene glycol (MIRALAX) 17 g, Oral, DAILY     prochlorperazine (COMPAZINE) 10 mg, Oral, PRN     senna-docusate (SENOKOT-S/PERICOLACE) 8.6-50 MG tablet 1 tablet, Oral, 2 TIMES DAILY     sildenafil (REVATIO) 20 mg, Oral, PRN     Xtampza ER 13.5 mg, Oral, EVERY 12 HOURS      Allergies:  Allergies   Allergen Reactions     Food Other (See Comments)     Pineapple and pineapple juice causes severe vomiting     Morphine      Sedated but the pain was not relieved       Penicillins Other (See Comments)     Childhood reaction, possible anaphylaxis     Pineapple Nausea and Vomiting     Review of Systems:  Negative 10 point review of systems unless noted in the HPI.  No changes in bowel or bladder habits; he has mild urinary incontinence at baseline.  _________________________________________________    OBJECTIVE:    Physical Exam:  /86 (BP Location: Left arm)   Pulse 83   Temp 96.3  F (35.7  C) (Oral)   Resp 18   Ht 1.829 m (6')   Wt 108.9 kg (240 lb)   SpO2 93%   BMI 32.55 kg/m    Estimated body mass index is 32.55 kg/m  as calculated from the following:    Height as of this encounter: 1.829 m (6').    Weight as of this encounter: 108.9 kg (240 lb).  GEN:  Sitting upright in bed, no acute distress  HEENT: Right craniotomy incision sutured and intact with no deformity, swelling, or erythema.  No drainage.  No conjunctival injection.  CV:  Limbs are warm and well perfused  PULM:  Breathing comfortably on room air.  ABD:  Nondistended, nontender  EXT:  No edema  SKIN:  Lumbar drain site sutured and fully closed with no drainage, swelling, fluctuance,  or surrounding erythema  PSYCH: Flat affect, good attention, responds appropriately but concretely  NEURO/MSK: Alert, speech fluent and comprehensible, mild cognitive processing delay, follows multistep commands, pupils equal, EOMI, facial sensation intact and symmetric to light touch, midline palate and tongue.  Strength 5/5 in all extremities aside from 4/5 strength in left elbow flexors/extensors and 4/5 left hip flexion.  3+ left biceps, brachioradialis, patellar reflexes, contralateral is 2+.  Negative hoffmans.  No dysmetria.  _________________________________________________  Pertinent Labs/Imaging:  CSF Cultures: 10/8, NGTD  K 10/13: 3.3  WBC 10/13: 6.5  Hgb 10/13: 10.7    Head CT 10/7:  Impression:  Postsurgical changes of right frontal craniotomy and underlying resection cavity in the right frontal lobe extending into the anterior horn of the right lateral ventricle. There is a 0.8 cm defect in the posterior aspect of the craniotomy with extension of CSF density fluid into the defect and into right frontal scalp, compatible with CSF leak.    Head CT 10/12:  Impression:  Postsurgical changes of right frontal craniotomy with underlying mass resection. The resection cavity is unchanged in size. No appreciable pseudomeningocele.  _________________________________________________        Patient was staffed with Dr. Ibarra  --------------------------------------------------------------------------------------  David Aleman MD, 10/13/2020  PM&R Resident, PGY 3  --------------------------------------------------------------------------------------

## 2020-10-13 NOTE — PLAN OF CARE
Status: Pt on 6A for CSF leak from head incision. Lumbar drain placed 10/8. Removed at 0100 this am d/t patient tossing and turning and flipping to stomach. Notified provider, came and assessed and removed LD. Pt laid flat on back for 1 hour afterward.   Vitals: VSS on RA  Neuros: A&Ox4. Flat affect, slow to respond. Impulsive. Forgetful. Denies N/T. 5/5 throughout.   IV: PIV TKO between abx  Resp/trach: Denies SOB. RA  Diet: Regular   Bowel status: Large loose incontinent BM.   : Urgency w/ incontinence   Skin: Crani incision, ANDREW. LD has old drainage on dressing.   Pain: Denies.   Activity: 1A GB. Forgets to use call light.   Social: Wife at bedside during days.   Plan: Continue to monitor and follow POC. Possible discharge Wednesday.

## 2020-10-13 NOTE — PHARMACY-VANCOMYCIN DOSING SERVICE
Pharmacy Vancomycin Note  Date of Service 2020  Patient's  1970   50 year old, male    Indication: Meningitis  Goal Trough Level: 15-20 mg/L  Day of Therapy: 6  Current Vancomycin regimen:  2250 mg IV q12h    Current estimated CrCl = Estimated Creatinine Clearance: 153.5 mL/min (based on SCr of 0.75 mg/dL).    Creatinine for last 3 days  10/11/2020:  7:23 AM Creatinine 0.69 mg/dL  10/12/2020:  5:55 AM Creatinine 0.84 mg/dL  10/13/2020:  6:06 AM Creatinine 0.75 mg/dL    Recent Vancomycin Levels (past 3 days)  10/11/2020:  4:08 PM Vancomycin Level 14.3 mg/L  10/13/2020:  5:00 PM Vancomycin Level 16.7 mg/L    Vancomycin IV Administrations (past 72 hours)                   vancomycin (VANCOCIN) 2,250 mg in sodium chloride 0.9 % 500 mL intermittent infusion (mg) 2,250 mg New Bag 10/13/20 1823     2,250 mg New Bag  0545     2,250 mg New Bag 10/12/20 1750     2,250 mg New Bag  0533    vancomycin (VANCOCIN) 2,000 mg in sodium chloride 0.9 % 500 mL intermittent infusion (mg) 2,000 mg New Bag 10/11/20 1805     2,000 mg New Bag  0511                Nephrotoxins and other renal medications (From now, onward)    Start     Dose/Rate Route Frequency Ordered Stop    10/12/20 0500  vancomycin (VANCOCIN) 2,250 mg in sodium chloride 0.9 % 500 mL intermittent infusion      2,250 mg  over 2 Hours Intravenous EVERY 12 HOURS 10/11/20 1832      10/08/20 0800  lisinopril (ZESTRIL) tablet 10 mg      10 mg Oral DAILY 10/07/20 2228               Contrast Orders - past 72 hours (72h ago, onward)    None          Interpretation of levels and current regimen:  Trough level is  Therapeutic    Has serum creatinine changed > 50% in last 72 hours: No    Urine output:  unable to determine    Renal Function: Stable    Plan:  1.  Continue Current Dose  2.  Pharmacy will check trough levels as appropriate in 3-5 Days.    3. Serum creatinine levels will be ordered daily for the first week of therapy and at least twice weekly for  subsequent weeks.      Altagracia Collins RP        .

## 2020-10-13 NOTE — PROGRESS NOTES
"Neurosurgery brief note     Paged by nursing staff that the patient apparently had rolled over and as a result the lumbar drain snapped and broke. When the nursing staff performed their routine check, they discovered that the drain was disconnected and leaking csf. The patient cannot tell how long drain has been disconnected, and he causally that he was \"picking at the stitches in the back when I'm not supposed to.\" The lumbar drain has been in place for 5 days total, the patient has not had csf leak from his incision. The original plan for later today was to clamp the drain. Due to the risk of increased infection, and the completion of the lumbar drain trial the decision was made to remove the lumbar drain and placed a 3-0 monocryl stitch.     Mattie Zaragoza    "

## 2020-10-13 NOTE — PLAN OF CARE
Status: returned from ARU to 6A for CSF leak from head incision. Lumbar drain placed 10/8. R crani and surgical resection of recurrent GBM on 9/23.  Vitals: VSS  Neuros: flat affect, generalized weakness, intermittent HA and nausea. Pt can be impulsive  IV: PIV at tko between abx, old extravasation site marked to LUE  Resp/trach: WNL, LSC  Diet: tolerating regular diet without difficulty. prefers to take pills with sprite, flawless swallow after sprite started.  Bowel status: BM yesterday, loose, holding bowel meds  : voiding without difficulty, continent  Skin: head incision sutured, ANDREW and intact  Pain: managed effectively with prn oxycodone, valium, and tylenol  Activity: up with SBA, GB and RN or with wife  Social: wife at bedside, supportive and updated  Plan: discharging to home likely tomorrow

## 2020-10-13 NOTE — PROGRESS NOTES
Neurosurgery progress note    S: no csf leakage from cranial incision. Shortly after midnight, patient rolled over the LD snapped, unclear how long the drain was disconnected, it was removed and a figure of eight suture placed    O:  Temp:  [95.2  F (35.1  C)-97.2  F (36.2  C)] 96.4  F (35.8  C)  Pulse:  [] 84  Resp:  [14-18] 18  BP: ()/(63-97) 110/76  SpO2:  [94 %-98 %] 98 %    Exam:  NEUROLOGIC:  -- Awake; Alert; oriented x 3  -- Follows commands briskly  -- +repetition, calculation, and naming  -- Speech fluent, spontaneous. No aphasia or dysarthria.  -- Mild L nasolabial flattening     Motor:  Normal bulk / tone; no tremor, rigidity, or bradykinesia.  No muscle wasting or fasciculations  No Pronator Drift  LUE and LLE 4/5 baseline per EMR    Cranio incision appears to be dry    Cultures NGTD    ASSESSMENT:  is a 50 year old man with a recent 9/23/2020 scheduled right craniotomy and surgical resection for ecurrent GBM with CSF leak from the incision. LD placed on 10/8 incision revised. LD removed on 10/13.        RECOMMENDATIONS:  - Continue monitoring for csf leak  - Broad spectrm abx  - Follow up Blood, CSF culture    Mattie Zaragoza MD  Neurosurgery Resident     Please contact neurosurgery resident on call with questions.    Dial * * [phone], enter [phone] when prompted

## 2020-10-13 NOTE — PROGRESS NOTES
10/13/20 1600   Quick Adds   Type of Visit Initial Occupational Therapy Evaluation   Living Environment   People in home spouse   Current Living Arrangements house   Home Accessibility stairs to enter home   Number of Stairs, Main Entrance 2   Transportation Anticipated family or friend will provide   Living Environment Comments pt has stopped driving in recent months.    Self-Care   Usual Activity Tolerance good   Current Activity Tolerance good   Disability/Function   Concentrating, Remembering or Making Decisions Difficulty no   Difficulty Communicating no   Difficulty Eating/Swallowing no   Walking or Climbing Stairs Difficulty no   Dressing/Bathing Difficulty no   Toileting no   Doing Errands Independently Difficulty (such as shopping) no   Fall history within last six months no   Change in Functional Status Since Onset of Current Illness/Injury yes   General Information   Referring Physician Shalini Sampson   Patient/Family Therapy Goal Statement (OT) increase cognitive function, return to work as a , particiapte in cooking and medication setup.    Additional Occupational Profile Info/Pertinent History of Current Problem  is a 50 year old man with a recent 9/23/2020 scheduled right craniotomy and surgical resection for ecurrent GBM with CSF leak from the incision. LD placed on 10/8 incision revised. LD removed on 10/13.   Existing Precautions/Restrictions other (see comments)  (craniotomy)   General Observations and Info supportive SO present.    Cognitive Status Examination   Orientation Status orientation to person, place and time   Affect/Mental Status (Cognitive) WNL   Follows Commands follows two-step commands  (limited beyond 2 step commands. )   Memory Deficit moderate deficit   Attention Deficit moderate deficit   Cognitive Status Comments further testing required see FS for details.    Range of Motion Comprehensive   General Range of Motion no range of motion deficits  identified   Coordination   Coordination Comments Pt with R hand fine motor deficits limiting writing, pt howveer able to legibly write.    Instrumental Activities of Daily Living (IADL)   IADL Comments pt assisting in cooking and medication management as well as wanting to return to work.    Clinical Impression   Criteria for Skilled Therapeutic Interventions Met (OT) yes   OT Diagnosis decreased ADL I/safety.    OT Problem List-Impairments impacting ADL cognition;post-surgical precautions   Assessment of Occupational Performance 3-5 Performance Deficits   Identified Performance Deficits dressing, bathing, cooking, medication managment, work.    Planned Therapy Interventions (OT) ADL retraining;IADL retraining;cognition;fine motor coordination training;strengthening;transfer training;home program guidelines;progressive activity/exercise;risk factor education   Clinical Decision Making Complexity (OT) low complexity   Therapy Frequency (OT) 3x/week   Predicted Duration of Therapy 1 week   Risks and Benefits of Treatment have been explained. Yes   Patient, Family & other staff in agreement with plan of care Yes   Comment-Clinical Impression Pt presents to OT with post surgical precuations as well as cognitive deficits leading to decreased ADL/IADL I. Pt at baseline was assisting in cooking as well as medication management at times completing without supervision. Pt currently with decreased cognitive skills and arnaud be at risk if he were to complete these tasks without 24/7 supervision.    OT Discharge Planning    OT Discharge Recommendation (DC Rec) Home with assist;home with home care occupational therapy   OT Rationale for DC Rec PT with cognitive deficits scoring 15 on SLUMS indicating dementia like symptoms, pt better with familliar and situational tasks, however, with significant decrease in working memory and focus. Pt at baseline with aprticiapting in cooking and medication managment and will require increased  "assist/training.    OT Brief overview of current status  pt scoring 15 on SLUMS indicating dementia like symptoms, sit to stand with SBA and with understanding of craniotomy precautions.    [facility] TM \"6 Clicks\"   2016, Trustees of [facility], under license to [employer].  All rights reserved.   6 Clicks Short Forms Daily Activity Inpatient Short Form   [facility] AM-PAC  \"6 Clicks\" Daily Activity Inpatient Short Form   1. Putting on and taking off regular lower body clothing? 3 - A Little   2. Bathing (including washing, rinsing, drying)? 3 - A Little   3. Toileting, which includes using toilet, bedpan or urinal? 3 - A Little   4. Putting on and taking off regular upper body clothing? 4 - None   5. Taking care of personal grooming such as brushing teeth? 4 - None   6. Eating meals? 4 - None   Daily Activity Raw Score (Score out of 24.Lower scores equate to lower levels of function) 21   Total Evaluation Time (Minutes)   Total Evaluation Time (Minutes) 5     "

## 2020-10-14 ENCOUNTER — ANESTHESIA (OUTPATIENT)
Dept: SURGERY | Facility: CLINIC | Age: 50
DRG: 031 | End: 2020-10-14
Payer: COMMERCIAL

## 2020-10-14 ENCOUNTER — APPOINTMENT (OUTPATIENT)
Dept: PHYSICAL THERAPY | Facility: CLINIC | Age: 50
DRG: 031 | End: 2020-10-14
Payer: COMMERCIAL

## 2020-10-14 ENCOUNTER — APPOINTMENT (OUTPATIENT)
Dept: CT IMAGING | Facility: CLINIC | Age: 50
DRG: 031 | End: 2020-10-14
Attending: STUDENT IN AN ORGANIZED HEALTH CARE EDUCATION/TRAINING PROGRAM
Payer: COMMERCIAL

## 2020-10-14 ENCOUNTER — ANESTHESIA EVENT (OUTPATIENT)
Dept: SURGERY | Facility: CLINIC | Age: 50
DRG: 031 | End: 2020-10-14
Payer: COMMERCIAL

## 2020-10-14 ENCOUNTER — APPOINTMENT (OUTPATIENT)
Dept: OCCUPATIONAL THERAPY | Facility: CLINIC | Age: 50
DRG: 031 | End: 2020-10-14
Payer: COMMERCIAL

## 2020-10-14 LAB
ALBUMIN SERPL-MCNC: 2.7 G/DL (ref 3.4–5)
ALP SERPL-CCNC: 44 U/L (ref 40–150)
ALT SERPL W P-5'-P-CCNC: 61 U/L (ref 0–70)
ANION GAP SERPL CALCULATED.3IONS-SCNC: 7 MMOL/L (ref 3–14)
AST SERPL W P-5'-P-CCNC: 18 U/L (ref 0–45)
BILIRUB SERPL-MCNC: 0.3 MG/DL (ref 0.2–1.3)
BUN SERPL-MCNC: 17 MG/DL (ref 7–30)
CALCIUM SERPL-MCNC: 8.2 MG/DL (ref 8.5–10.1)
CHLORIDE SERPL-SCNC: 114 MMOL/L (ref 94–109)
CO2 SERPL-SCNC: 20 MMOL/L (ref 20–32)
CREAT SERPL-MCNC: 0.66 MG/DL (ref 0.66–1.25)
CRP SERPL-MCNC: 14 MG/L (ref 0–8)
ERYTHROCYTE [DISTWIDTH] IN BLOOD BY AUTOMATED COUNT: 13.7 % (ref 10–15)
GFR SERPL CREATININE-BSD FRML MDRD: >90 ML/MIN/{1.73_M2}
GLUCOSE BLDC GLUCOMTR-MCNC: 90 MG/DL (ref 70–99)
GLUCOSE SERPL-MCNC: 135 MG/DL (ref 70–99)
HCT VFR BLD AUTO: 31.6 % (ref 40–53)
HGB BLD-MCNC: 10.3 G/DL (ref 13.3–17.7)
MCH RBC QN AUTO: 30.8 PG (ref 26.5–33)
MCHC RBC AUTO-ENTMCNC: 32.6 G/DL (ref 31.5–36.5)
MCV RBC AUTO: 95 FL (ref 78–100)
PLATELET # BLD AUTO: 128 10E9/L (ref 150–450)
POTASSIUM SERPL-SCNC: 3.3 MMOL/L (ref 3.4–5.3)
POTASSIUM SERPL-SCNC: 4.3 MMOL/L (ref 3.4–5.3)
PROT SERPL-MCNC: 5.5 G/DL (ref 6.8–8.8)
RBC # BLD AUTO: 3.34 10E12/L (ref 4.4–5.9)
SODIUM SERPL-SCNC: 141 MMOL/L (ref 133–144)
WBC # BLD AUTO: 7 10E9/L (ref 4–11)

## 2020-10-14 PROCEDURE — 80053 COMPREHEN METABOLIC PANEL: CPT | Performed by: NEUROLOGICAL SURGERY

## 2020-10-14 PROCEDURE — 250N000013 HC RX MED GY IP 250 OP 250 PS 637: Performed by: NURSE PRACTITIONER

## 2020-10-14 PROCEDURE — 258N000003 HC RX IP 258 OP 636: Performed by: NURSE PRACTITIONER

## 2020-10-14 PROCEDURE — 278N000051 HC OR IMPLANT GENERAL: Performed by: NEUROLOGICAL SURGERY

## 2020-10-14 PROCEDURE — 999N001017 HC STATISTIC GLUCOSE BY METER IP

## 2020-10-14 PROCEDURE — 62223 ESTABLISH BRAIN CAVITY SHUNT: CPT | Mod: 62 | Performed by: NEUROLOGICAL SURGERY

## 2020-10-14 PROCEDURE — 86140 C-REACTIVE PROTEIN: CPT | Performed by: NEUROLOGICAL SURGERY

## 2020-10-14 PROCEDURE — 250N000009 HC RX 250: Performed by: NEUROLOGICAL SURGERY

## 2020-10-14 PROCEDURE — 761N000003 HC RECOVERY PHASE 1 LEVEL 2 FIRST HR: Performed by: NEUROLOGICAL SURGERY

## 2020-10-14 PROCEDURE — 999N000127 HC STATISTIC PERIPHERAL IV START W US GUIDANCE

## 2020-10-14 PROCEDURE — 250N000013 HC RX MED GY IP 250 OP 250 PS 637: Performed by: STUDENT IN AN ORGANIZED HEALTH CARE EDUCATION/TRAINING PROGRAM

## 2020-10-14 PROCEDURE — 120N000002 HC R&B MED SURG/OB UMMC

## 2020-10-14 PROCEDURE — 250N000011 HC RX IP 250 OP 636: Performed by: NEUROLOGICAL SURGERY

## 2020-10-14 PROCEDURE — 97530 THERAPEUTIC ACTIVITIES: CPT | Mod: GP

## 2020-10-14 PROCEDURE — 84132 ASSAY OF SERUM POTASSIUM: CPT | Performed by: NEUROLOGICAL SURGERY

## 2020-10-14 PROCEDURE — 258N000003 HC RX IP 258 OP 636: Performed by: NEUROLOGICAL SURGERY

## 2020-10-14 PROCEDURE — 70450 CT HEAD/BRAIN W/O DYE: CPT | Mod: 26 | Performed by: RADIOLOGY

## 2020-10-14 PROCEDURE — 70450 CT HEAD/BRAIN W/O DYE: CPT | Mod: 26 | Performed by: STUDENT IN AN ORGANIZED HEALTH CARE EDUCATION/TRAINING PROGRAM

## 2020-10-14 PROCEDURE — 258N000003 HC RX IP 258 OP 636: Performed by: ANESTHESIOLOGY

## 2020-10-14 PROCEDURE — 250N000011 HC RX IP 250 OP 636: Performed by: STUDENT IN AN ORGANIZED HEALTH CARE EDUCATION/TRAINING PROGRAM

## 2020-10-14 PROCEDURE — 272N000001 HC OR GENERAL SUPPLY STERILE: Performed by: NEUROLOGICAL SURGERY

## 2020-10-14 PROCEDURE — 258N000003 HC RX IP 258 OP 636: Performed by: STUDENT IN AN ORGANIZED HEALTH CARE EDUCATION/TRAINING PROGRAM

## 2020-10-14 PROCEDURE — 360N000041 HC SURGERY LEVEL 6 EA 15 ADDTL MIN - UMMC: Performed by: NEUROLOGICAL SURGERY

## 2020-10-14 PROCEDURE — 70450 CT HEAD/BRAIN W/O DYE: CPT | Mod: 76

## 2020-10-14 PROCEDURE — 97112 NEUROMUSCULAR REEDUCATION: CPT | Mod: GP

## 2020-10-14 PROCEDURE — 97116 GAIT TRAINING THERAPY: CPT | Mod: GP

## 2020-10-14 PROCEDURE — 62223 ESTABLISH BRAIN CAVITY SHUNT: CPT | Mod: 62 | Performed by: SURGERY

## 2020-10-14 PROCEDURE — 370N000001 HC ANESTHESIA TECHNICAL FEE, 1ST 30 MIN: Performed by: NEUROLOGICAL SURGERY

## 2020-10-14 PROCEDURE — 36415 COLL VENOUS BLD VENIPUNCTURE: CPT | Performed by: NEUROLOGICAL SURGERY

## 2020-10-14 PROCEDURE — 61781 SCAN PROC CRANIAL INTRA: CPT | Mod: GC | Performed by: NEUROLOGICAL SURGERY

## 2020-10-14 PROCEDURE — 250N000003 HC SEVOFLURANE, EA 15 MIN: Performed by: NEUROLOGICAL SURGERY

## 2020-10-14 PROCEDURE — 999N000139 HC STATISTIC PRE-PROCEDURE ASSESSMENT II: Performed by: NEUROLOGICAL SURGERY

## 2020-10-14 PROCEDURE — 97535 SELF CARE MNGMENT TRAINING: CPT | Mod: GO | Performed by: OCCUPATIONAL THERAPIST

## 2020-10-14 PROCEDURE — 85027 COMPLETE CBC AUTOMATED: CPT | Performed by: NEUROLOGICAL SURGERY

## 2020-10-14 PROCEDURE — 70450 CT HEAD/BRAIN W/O DYE: CPT

## 2020-10-14 PROCEDURE — 360N000043 HC SURGERY LEVEL 6 W FLUORO 1ST 30 MIN - UMMC: Performed by: NEUROLOGICAL SURGERY

## 2020-10-14 PROCEDURE — C1894 INTRO/SHEATH, NON-LASER: HCPCS | Performed by: NEUROLOGICAL SURGERY

## 2020-10-14 PROCEDURE — 250N000009 HC RX 250: Performed by: STUDENT IN AN ORGANIZED HEALTH CARE EDUCATION/TRAINING PROGRAM

## 2020-10-14 PROCEDURE — 370N000002 HC ANESTHESIA TECHNICAL FEE, EACH ADDTL 15 MIN: Performed by: NEUROLOGICAL SURGERY

## 2020-10-14 DEVICE — SHUNT CATH VENTRICULAR BACTISEAL 82-3073: Type: IMPLANTABLE DEVICE | Site: CRANIAL | Status: FUNCTIONAL

## 2020-10-14 DEVICE — SHUNT CATH PERITONEAL BACTISEAL 82-3074: Type: IMPLANTABLE DEVICE | Site: CRANIAL | Status: FUNCTIONAL

## 2020-10-14 RX ORDER — ESMOLOL HYDROCHLORIDE 10 MG/ML
INJECTION INTRAVENOUS PRN
Status: DISCONTINUED | OUTPATIENT
Start: 2020-10-14 | End: 2020-10-15

## 2020-10-14 RX ORDER — CLINDAMYCIN PHOSPHATE 900 MG/50ML
900 INJECTION, SOLUTION INTRAVENOUS
Status: DISCONTINUED | OUTPATIENT
Start: 2020-10-14 | End: 2020-10-15 | Stop reason: HOSPADM

## 2020-10-14 RX ORDER — PROPOFOL 10 MG/ML
INJECTION, EMULSION INTRAVENOUS PRN
Status: DISCONTINUED | OUTPATIENT
Start: 2020-10-14 | End: 2020-10-15

## 2020-10-14 RX ORDER — SODIUM CHLORIDE, SODIUM LACTATE, POTASSIUM CHLORIDE, CALCIUM CHLORIDE 600; 310; 30; 20 MG/100ML; MG/100ML; MG/100ML; MG/100ML
INJECTION, SOLUTION INTRAVENOUS CONTINUOUS
Status: DISCONTINUED | OUTPATIENT
Start: 2020-10-14 | End: 2020-10-15 | Stop reason: HOSPADM

## 2020-10-14 RX ORDER — GENTAMICIN SULFATE 80 MG/100ML
INJECTION, SOLUTION INTRAVENOUS PRN
Status: DISCONTINUED | OUTPATIENT
Start: 2020-10-14 | End: 2020-10-15

## 2020-10-14 RX ORDER — DEXAMETHASONE SODIUM PHOSPHATE 4 MG/ML
INJECTION, SOLUTION INTRA-ARTICULAR; INTRALESIONAL; INTRAMUSCULAR; INTRAVENOUS; SOFT TISSUE PRN
Status: DISCONTINUED | OUTPATIENT
Start: 2020-10-14 | End: 2020-10-15

## 2020-10-14 RX ORDER — SODIUM CHLORIDE, SODIUM LACTATE, POTASSIUM CHLORIDE, CALCIUM CHLORIDE 600; 310; 30; 20 MG/100ML; MG/100ML; MG/100ML; MG/100ML
INJECTION, SOLUTION INTRAVENOUS CONTINUOUS PRN
Status: DISCONTINUED | OUTPATIENT
Start: 2020-10-14 | End: 2020-10-15

## 2020-10-14 RX ORDER — ONDANSETRON 2 MG/ML
INJECTION INTRAMUSCULAR; INTRAVENOUS PRN
Status: DISCONTINUED | OUTPATIENT
Start: 2020-10-14 | End: 2020-10-15

## 2020-10-14 RX ORDER — CLINDAMYCIN PHOSPHATE 900 MG/50ML
900 INJECTION, SOLUTION INTRAVENOUS SEE ADMIN INSTRUCTIONS
Status: DISCONTINUED | OUTPATIENT
Start: 2020-10-14 | End: 2020-10-15 | Stop reason: HOSPADM

## 2020-10-14 RX ORDER — FENTANYL CITRATE 50 UG/ML
INJECTION, SOLUTION INTRAMUSCULAR; INTRAVENOUS PRN
Status: DISCONTINUED | OUTPATIENT
Start: 2020-10-14 | End: 2020-10-15

## 2020-10-14 RX ORDER — LIDOCAINE HYDROCHLORIDE 20 MG/ML
INJECTION, SOLUTION INFILTRATION; PERINEURAL PRN
Status: DISCONTINUED | OUTPATIENT
Start: 2020-10-14 | End: 2020-10-15

## 2020-10-14 RX ORDER — SODIUM CHLORIDE 9 MG/ML
INJECTION, SOLUTION INTRAVENOUS CONTINUOUS
Status: DISCONTINUED | OUTPATIENT
Start: 2020-10-14 | End: 2020-10-19 | Stop reason: HOSPADM

## 2020-10-14 RX ADMIN — METRONIDAZOLE 500 MG: 500 INJECTION, SOLUTION INTRAVENOUS at 22:11

## 2020-10-14 RX ADMIN — METRONIDAZOLE 500 MG: 500 INJECTION, SOLUTION INTRAVENOUS at 04:19

## 2020-10-14 RX ADMIN — METHYLPHENIDATE HYDROCHLORIDE 5 MG: 5 TABLET ORAL at 12:19

## 2020-10-14 RX ADMIN — SODIUM CITRATE AND CITRIC ACID MONOHYDRATE 30 ML: 500; 334 SOLUTION ORAL at 22:01

## 2020-10-14 RX ADMIN — FENTANYL CITRATE 50 MCG: 50 INJECTION, SOLUTION INTRAMUSCULAR; INTRAVENOUS at 23:28

## 2020-10-14 RX ADMIN — METRONIDAZOLE 500 MG: 500 INJECTION, SOLUTION INTRAVENOUS at 10:15

## 2020-10-14 RX ADMIN — ESMOLOL HYDROCHLORIDE 20 MG: 10 INJECTION, SOLUTION INTRAVENOUS at 23:45

## 2020-10-14 RX ADMIN — LISINOPRIL 10 MG: 10 TABLET ORAL at 08:28

## 2020-10-14 RX ADMIN — FENTANYL CITRATE 50 MCG: 50 INJECTION, SOLUTION INTRAMUSCULAR; INTRAVENOUS at 22:29

## 2020-10-14 RX ADMIN — ACETAMINOPHEN 650 MG: 325 TABLET, FILM COATED ORAL at 18:28

## 2020-10-14 RX ADMIN — GENTAMICIN SULFATE 80 MG: 80 INJECTION, SOLUTION INTRAVENOUS at 23:15

## 2020-10-14 RX ADMIN — PROPOFOL 30 MG: 10 INJECTION, EMULSION INTRAVENOUS at 23:28

## 2020-10-14 RX ADMIN — METHYLPHENIDATE HYDROCHLORIDE 5 MG: 5 TABLET ORAL at 08:36

## 2020-10-14 RX ADMIN — LEVETIRACETAM 1500 MG: 750 TABLET, FILM COATED ORAL at 19:51

## 2020-10-14 RX ADMIN — OMEPRAZOLE 40 MG: 20 CAPSULE, DELAYED RELEASE ORAL at 08:28

## 2020-10-14 RX ADMIN — METHOCARBAMOL 750 MG: 750 TABLET, FILM COATED ORAL at 16:42

## 2020-10-14 RX ADMIN — SODIUM CHLORIDE: 9 INJECTION, SOLUTION INTRAVENOUS at 15:03

## 2020-10-14 RX ADMIN — DIAZEPAM 5 MG: 5 TABLET ORAL at 12:19

## 2020-10-14 RX ADMIN — ROCURONIUM BROMIDE 20 MG: 10 INJECTION INTRAVENOUS at 23:29

## 2020-10-14 RX ADMIN — OXYCODONE HYDROCHLORIDE 10 MG: 5 TABLET ORAL at 06:00

## 2020-10-14 RX ADMIN — LIDOCAINE HYDROCHLORIDE 100 MG: 20 INJECTION, SOLUTION INFILTRATION; PERINEURAL at 22:29

## 2020-10-14 RX ADMIN — VANCOMYCIN HYDROCHLORIDE 2250 MG: 10 INJECTION, POWDER, LYOPHILIZED, FOR SOLUTION INTRAVENOUS at 18:27

## 2020-10-14 RX ADMIN — LEVETIRACETAM 1500 MG: 750 TABLET, FILM COATED ORAL at 08:28

## 2020-10-14 RX ADMIN — METHOCARBAMOL 750 MG: 750 TABLET, FILM COATED ORAL at 11:38

## 2020-10-14 RX ADMIN — METRONIDAZOLE 500 MG: 500 INJECTION, SOLUTION INTRAVENOUS at 17:17

## 2020-10-14 RX ADMIN — SODIUM CHLORIDE, POTASSIUM CHLORIDE, SODIUM LACTATE AND CALCIUM CHLORIDE: 600; 310; 30; 20 INJECTION, SOLUTION INTRAVENOUS at 22:13

## 2020-10-14 RX ADMIN — DIAZEPAM 5 MG: 5 TABLET ORAL at 03:47

## 2020-10-14 RX ADMIN — CEFEPIME 2 G: 2 INJECTION, POWDER, FOR SOLUTION INTRAVENOUS at 08:42

## 2020-10-14 RX ADMIN — SODIUM CHLORIDE, POTASSIUM CHLORIDE, SODIUM LACTATE AND CALCIUM CHLORIDE: 600; 310; 30; 20 INJECTION, SOLUTION INTRAVENOUS at 21:06

## 2020-10-14 RX ADMIN — METHOCARBAMOL 750 MG: 750 TABLET, FILM COATED ORAL at 08:28

## 2020-10-14 RX ADMIN — FENTANYL CITRATE 50 MCG: 50 INJECTION, SOLUTION INTRAMUSCULAR; INTRAVENOUS at 23:44

## 2020-10-14 RX ADMIN — VANCOMYCIN HYDROCHLORIDE 2250 MG: 10 INJECTION, POWDER, LYOPHILIZED, FOR SOLUTION INTRAVENOUS at 05:55

## 2020-10-14 RX ADMIN — ROCURONIUM BROMIDE 20 MG: 10 INJECTION INTRAVENOUS at 23:06

## 2020-10-14 RX ADMIN — DEXAMETHASONE SODIUM PHOSPHATE 4 MG: 4 INJECTION, SOLUTION INTRA-ARTICULAR; INTRALESIONAL; INTRAMUSCULAR; INTRAVENOUS; SOFT TISSUE at 22:56

## 2020-10-14 RX ADMIN — DEXAMETHASONE SODIUM PHOSPHATE 2 MG: 4 INJECTION, SOLUTION INTRAMUSCULAR; INTRAVENOUS at 08:27

## 2020-10-14 RX ADMIN — ROCURONIUM BROMIDE 70 MG: 10 INJECTION INTRAVENOUS at 22:29

## 2020-10-14 RX ADMIN — POTASSIUM CHLORIDE 20 MEQ: 750 TABLET, EXTENDED RELEASE ORAL at 11:38

## 2020-10-14 RX ADMIN — ESCITALOPRAM 10 MG: 10 TABLET, FILM COATED ORAL at 08:28

## 2020-10-14 RX ADMIN — CEFEPIME 2 G: 2 INJECTION, POWDER, FOR SOLUTION INTRAVENOUS at 16:42

## 2020-10-14 RX ADMIN — FENTANYL CITRATE 50 MCG: 50 INJECTION, SOLUTION INTRAMUSCULAR; INTRAVENOUS at 23:25

## 2020-10-14 RX ADMIN — PROPOFOL 130 MG: 10 INJECTION, EMULSION INTRAVENOUS at 22:29

## 2020-10-14 RX ADMIN — METHOCARBAMOL 750 MG: 750 TABLET, FILM COATED ORAL at 19:51

## 2020-10-14 RX ADMIN — ONDANSETRON 4 MG: 2 INJECTION INTRAMUSCULAR; INTRAVENOUS at 22:56

## 2020-10-14 RX ADMIN — ACETAMINOPHEN 650 MG: 325 TABLET, FILM COATED ORAL at 06:00

## 2020-10-14 RX ADMIN — POTASSIUM CHLORIDE 40 MEQ: 750 TABLET, EXTENDED RELEASE ORAL at 08:28

## 2020-10-14 ASSESSMENT — ACTIVITIES OF DAILY LIVING (ADL)
ADLS_ACUITY_SCORE: 15
ADLS_ACUITY_SCORE: 13
ADLS_ACUITY_SCORE: 13
ADLS_ACUITY_SCORE: 19
ADLS_ACUITY_SCORE: 13
ADLS_ACUITY_SCORE: 13

## 2020-10-14 ASSESSMENT — ENCOUNTER SYMPTOMS
DYSRHYTHMIAS: 0
SEIZURES: 1

## 2020-10-14 ASSESSMENT — VISUAL ACUITY
OU: NORMAL ACUITY
OU: NORMAL ACUITY

## 2020-10-14 NOTE — PLAN OF CARE
Status: Pt on 6A for CSF leak from head incision. Lumbar drain placed 10/8. R crani and surgical resection of recurrent GBM on 9/23.  Vitals: VSS on RA   Neuros: A&Ox4. Slow to respond to commands, flat affect, very impulsive when needing to use BR   IV: PIV TKO b/t antibiotics.   Diet: Regular diet. No c/o nausea   Bowel status: Had a BM this shift, fecal incontinence   : voiding spontaneously with urgency   Skin: Old LD removal site w/ primipore, CDI. R crani incision w/ sutures, ANDREW.  Pain: No c/o pain overnight. PRN valium given for anxiety  Activity: SBA/GB. Impulsive w/ movement/urgency.   Plan: Discharge home w/ wife. Continue to monitor and follow POC.

## 2020-10-14 NOTE — PLAN OF CARE
Status: Pt on 6A for CSF leak from head incision. Lumbar drain placed 10/8. R crani and surgical resection of recurrent GBM on 9/23.  Vitals: VSS, on ra   Neuros: Oriented x4. Slow to respond to commands. Impulsive. Otherwise intact.   IV: PIV TKO b/t antibiotics. L AC extravasation site, 48 hr picture taken.   Diet: Regular diet. No c/o nausea   Bowel status: +bs, no bm  : voiding spontaneously   Skin: Old LD removal site w/ sutures and primipore on top. R crani incision w/ sutures, ANDREW.  Pain: c/o HA, tylenol, scheduled robaxin, and PRN valium given  Activity: SBA w/ gb. Impulsive w/ movement/urgency.   Social: Wife at bedside earlier, supportive   Plan: Discharge home w/ wife

## 2020-10-14 NOTE — PROGRESS NOTES
Neurosurgery progress note    S: no csf leakage from cranial incision.   O:  Temp:  [96.3  F (35.7  C)-97.8  F (36.6  C)] 97.4  F (36.3  C)  Pulse:  [] 92  Resp:  [16-18] 16  BP: (100-119)/(68-86) 109/77  SpO2:  [93 %-98 %] 93 %    Exam:  NEUROLOGIC:  -- Awake; Alert; oriented x 3  -- Follows commands briskly  -- +repetition, calculation, and naming  -- Speech fluent, spontaneous. No aphasia or dysarthria.  -- Mild L nasolabial flattening     Motor:  Normal bulk / tone; no tremor, rigidity, or bradykinesia.  No muscle wasting or fasciculations  No Pronator Drift  LUE and LLE 4/5 baseline per EMR    Cranio incision appears to be dry    Cultures NGTD    CT T and L: no retained LD fragment   CTH: no hygroma, stable post surgical changes    ASSESSMENT:  is a 50 year old man with a recent 9/23/2020 scheduled right craniotomy and surgical resection for ecurrent GBM with CSF leak from the incision. LD placed on 10/8 incision revised. LD removed on 10/13.     RECOMMENDATIONS:  - Continue monitoring for csf leak  - Broad spectrm abx, finish morning dose  - Follow up Blood, CSF culture  - Discharge home with wife today    Mattie Zaragoza MD  Neurosurgery Resident     Please contact neurosurgery resident on call with questions.    Dial * * [phone], enter [phone] when prompted      Neurosurgery faculty  CSF leak from the incision is noted on rounds. I believe this patient will require a left VPS. I will consent the patient and proceed accordingly.

## 2020-10-14 NOTE — PLAN OF CARE
Status: returned from ARU to 6A for CSF leak from head incision. Lumbar drain placed 10/8. R crani and surgical resection of recurrent GBM on 9/23.  Vitals: VSS  Neuros: flat affect, generalized weakness, intermittent HA and nausea. Pt can be impulsive  IV: MIV infusing at 75 ml/hr  Resp/trach: WNL, LSC  Diet: NPO  Bowel status: BM yesterday, loose, holding bowel meds  : voiding without difficulty, continent  Skin: head incision sutured, ANDREW but slightly more boggy today with scant clear drainage noted this morning. MDs aware  Pain: managed effectively with prn oxycodone, valium, and tylenol  Activity: up with SBA, DEBI and RN or with wife  Social: wife at bedside, supportive and updated  Plan: awaiting OR time for  shunt placement

## 2020-10-15 ENCOUNTER — APPOINTMENT (OUTPATIENT)
Dept: GENERAL RADIOLOGY | Facility: CLINIC | Age: 50
DRG: 031 | End: 2020-10-15
Attending: STUDENT IN AN ORGANIZED HEALTH CARE EDUCATION/TRAINING PROGRAM
Payer: COMMERCIAL

## 2020-10-15 ENCOUNTER — APPOINTMENT (OUTPATIENT)
Dept: PHYSICAL THERAPY | Facility: CLINIC | Age: 50
DRG: 031 | End: 2020-10-15
Payer: COMMERCIAL

## 2020-10-15 ENCOUNTER — APPOINTMENT (OUTPATIENT)
Dept: CT IMAGING | Facility: CLINIC | Age: 50
DRG: 031 | End: 2020-10-15
Attending: STUDENT IN AN ORGANIZED HEALTH CARE EDUCATION/TRAINING PROGRAM
Payer: COMMERCIAL

## 2020-10-15 ENCOUNTER — APPOINTMENT (OUTPATIENT)
Dept: CT IMAGING | Facility: CLINIC | Age: 50
DRG: 031 | End: 2020-10-15
Attending: NURSE PRACTITIONER
Payer: COMMERCIAL

## 2020-10-15 ENCOUNTER — APPOINTMENT (OUTPATIENT)
Dept: OCCUPATIONAL THERAPY | Facility: CLINIC | Age: 50
DRG: 031 | End: 2020-10-15
Payer: COMMERCIAL

## 2020-10-15 LAB
ALBUMIN SERPL-MCNC: 2.9 G/DL (ref 3.4–5)
ALP SERPL-CCNC: 47 U/L (ref 40–150)
ALT SERPL W P-5'-P-CCNC: 79 U/L (ref 0–70)
ANION GAP SERPL CALCULATED.3IONS-SCNC: 6 MMOL/L (ref 3–14)
AST SERPL W P-5'-P-CCNC: 25 U/L (ref 0–45)
BILIRUB SERPL-MCNC: 0.5 MG/DL (ref 0.2–1.3)
BUN SERPL-MCNC: 8 MG/DL (ref 7–30)
CALCIUM SERPL-MCNC: 8.7 MG/DL (ref 8.5–10.1)
CHLORIDE SERPL-SCNC: 110 MMOL/L (ref 94–109)
CO2 SERPL-SCNC: 25 MMOL/L (ref 20–32)
CREAT SERPL-MCNC: 0.61 MG/DL (ref 0.66–1.25)
CRP SERPL-MCNC: 20 MG/L (ref 0–8)
ERYTHROCYTE [DISTWIDTH] IN BLOOD BY AUTOMATED COUNT: 13.7 % (ref 10–15)
GFR SERPL CREATININE-BSD FRML MDRD: >90 ML/MIN/{1.73_M2}
GLUCOSE SERPL-MCNC: 137 MG/DL (ref 70–99)
HCT VFR BLD AUTO: 32.3 % (ref 40–53)
HGB BLD-MCNC: 10.7 G/DL (ref 13.3–17.7)
MCH RBC QN AUTO: 30.6 PG (ref 26.5–33)
MCHC RBC AUTO-ENTMCNC: 33.1 G/DL (ref 31.5–36.5)
MCV RBC AUTO: 92 FL (ref 78–100)
PLATELET # BLD AUTO: 142 10E9/L (ref 150–450)
POTASSIUM SERPL-SCNC: 4 MMOL/L (ref 3.4–5.3)
PROT SERPL-MCNC: 5.9 G/DL (ref 6.8–8.8)
RBC # BLD AUTO: 3.5 10E12/L (ref 4.4–5.9)
SODIUM SERPL-SCNC: 141 MMOL/L (ref 133–144)
WBC # BLD AUTO: 8 10E9/L (ref 4–11)

## 2020-10-15 PROCEDURE — 97535 SELF CARE MNGMENT TRAINING: CPT | Mod: GO

## 2020-10-15 PROCEDURE — 250N000009 HC RX 250: Performed by: STUDENT IN AN ORGANIZED HEALTH CARE EDUCATION/TRAINING PROGRAM

## 2020-10-15 PROCEDURE — 00160J6 BYPASS CEREBRAL VENTRICLE TO PERITONEAL CAVITY WITH SYNTHETIC SUBSTITUTE, OPEN APPROACH: ICD-10-PCS | Performed by: NEUROLOGICAL SURGERY

## 2020-10-15 PROCEDURE — 70450 CT HEAD/BRAIN W/O DYE: CPT | Mod: 26 | Performed by: STUDENT IN AN ORGANIZED HEALTH CARE EDUCATION/TRAINING PROGRAM

## 2020-10-15 PROCEDURE — 70250 X-RAY EXAM OF SKULL: CPT | Mod: 26 | Performed by: RADIOLOGY

## 2020-10-15 PROCEDURE — 250N000011 HC RX IP 250 OP 636: Performed by: STUDENT IN AN ORGANIZED HEALTH CARE EDUCATION/TRAINING PROGRAM

## 2020-10-15 PROCEDURE — 258N000003 HC RX IP 258 OP 636: Performed by: NURSE PRACTITIONER

## 2020-10-15 PROCEDURE — 80053 COMPREHEN METABOLIC PANEL: CPT | Performed by: NEUROLOGICAL SURGERY

## 2020-10-15 PROCEDURE — 250N000013 HC RX MED GY IP 250 OP 250 PS 637: Performed by: STUDENT IN AN ORGANIZED HEALTH CARE EDUCATION/TRAINING PROGRAM

## 2020-10-15 PROCEDURE — 250N000012 HC RX MED GY IP 250 OP 636 PS 637: Performed by: NURSE PRACTITIONER

## 2020-10-15 PROCEDURE — 71045 X-RAY EXAM CHEST 1 VIEW: CPT | Mod: 26 | Performed by: RADIOLOGY

## 2020-10-15 PROCEDURE — 74021 RADEX ABDOMEN 3+ VIEWS: CPT

## 2020-10-15 PROCEDURE — 70450 CT HEAD/BRAIN W/O DYE: CPT

## 2020-10-15 PROCEDURE — 36415 COLL VENOUS BLD VENIPUNCTURE: CPT | Performed by: NEUROLOGICAL SURGERY

## 2020-10-15 PROCEDURE — 70450 CT HEAD/BRAIN W/O DYE: CPT | Mod: 26 | Performed by: RADIOLOGY

## 2020-10-15 PROCEDURE — 120N000002 HC R&B MED SURG/OB UMMC

## 2020-10-15 PROCEDURE — 85027 COMPLETE CBC AUTOMATED: CPT | Performed by: NEUROLOGICAL SURGERY

## 2020-10-15 PROCEDURE — 97530 THERAPEUTIC ACTIVITIES: CPT | Mod: GP

## 2020-10-15 PROCEDURE — 74018 RADEX ABDOMEN 1 VIEW: CPT | Mod: 26 | Performed by: RADIOLOGY

## 2020-10-15 PROCEDURE — 258N000003 HC RX IP 258 OP 636: Performed by: NEUROLOGICAL SURGERY

## 2020-10-15 PROCEDURE — 999N000128 HC STATISTIC PERIPHERAL IV START W/O US GUIDANCE

## 2020-10-15 PROCEDURE — 70450 CT HEAD/BRAIN W/O DYE: CPT | Mod: 77

## 2020-10-15 PROCEDURE — 250N000011 HC RX IP 250 OP 636: Performed by: NEUROLOGICAL SURGERY

## 2020-10-15 PROCEDURE — 86140 C-REACTIVE PROTEIN: CPT | Performed by: NEUROLOGICAL SURGERY

## 2020-10-15 PROCEDURE — 258N000003 HC RX IP 258 OP 636: Performed by: STUDENT IN AN ORGANIZED HEALTH CARE EDUCATION/TRAINING PROGRAM

## 2020-10-15 PROCEDURE — 97750 PHYSICAL PERFORMANCE TEST: CPT | Mod: GP

## 2020-10-15 RX ORDER — ONDANSETRON 2 MG/ML
4 INJECTION INTRAMUSCULAR; INTRAVENOUS EVERY 30 MIN PRN
Status: DISCONTINUED | OUTPATIENT
Start: 2020-10-15 | End: 2020-10-15 | Stop reason: HOSPADM

## 2020-10-15 RX ORDER — ONDANSETRON 4 MG/1
4 TABLET, ORALLY DISINTEGRATING ORAL EVERY 30 MIN PRN
Status: DISCONTINUED | OUTPATIENT
Start: 2020-10-15 | End: 2020-10-15 | Stop reason: HOSPADM

## 2020-10-15 RX ORDER — CITRIC ACID/SODIUM CITRATE 334-500MG
SOLUTION, ORAL ORAL PRN
Status: DISCONTINUED | OUTPATIENT
Start: 2020-10-14 | End: 2020-10-15

## 2020-10-15 RX ORDER — SODIUM CHLORIDE, SODIUM LACTATE, POTASSIUM CHLORIDE, CALCIUM CHLORIDE 600; 310; 30; 20 MG/100ML; MG/100ML; MG/100ML; MG/100ML
INJECTION, SOLUTION INTRAVENOUS CONTINUOUS
Status: DISCONTINUED | OUTPATIENT
Start: 2020-10-15 | End: 2020-10-15 | Stop reason: HOSPADM

## 2020-10-15 RX ORDER — FENTANYL CITRATE 50 UG/ML
25-50 INJECTION, SOLUTION INTRAMUSCULAR; INTRAVENOUS
Status: DISCONTINUED | OUTPATIENT
Start: 2020-10-15 | End: 2020-10-15 | Stop reason: HOSPADM

## 2020-10-15 RX ORDER — NALOXONE HYDROCHLORIDE 0.4 MG/ML
.1-.4 INJECTION, SOLUTION INTRAMUSCULAR; INTRAVENOUS; SUBCUTANEOUS
Status: DISCONTINUED | OUTPATIENT
Start: 2020-10-15 | End: 2020-10-15

## 2020-10-15 RX ORDER — DEXAMETHASONE 2 MG/1
2 TABLET ORAL EVERY 12 HOURS SCHEDULED
Status: DISCONTINUED | OUTPATIENT
Start: 2020-10-15 | End: 2020-10-19 | Stop reason: HOSPADM

## 2020-10-15 RX ORDER — HYDROMORPHONE HYDROCHLORIDE 1 MG/ML
.3-.5 INJECTION, SOLUTION INTRAMUSCULAR; INTRAVENOUS; SUBCUTANEOUS EVERY 5 MIN PRN
Status: DISCONTINUED | OUTPATIENT
Start: 2020-10-15 | End: 2020-10-15 | Stop reason: HOSPADM

## 2020-10-15 RX ADMIN — DEXAMETHASONE 2 MG: 2 TABLET ORAL at 19:30

## 2020-10-15 RX ADMIN — ACETAMINOPHEN 650 MG: 325 TABLET, FILM COATED ORAL at 08:54

## 2020-10-15 RX ADMIN — SUGAMMADEX 200 MG: 100 INJECTION, SOLUTION INTRAVENOUS at 00:37

## 2020-10-15 RX ADMIN — CEFEPIME 2 G: 2 INJECTION, POWDER, FOR SOLUTION INTRAVENOUS at 16:52

## 2020-10-15 RX ADMIN — ACETAMINOPHEN 650 MG: 325 TABLET, FILM COATED ORAL at 17:33

## 2020-10-15 RX ADMIN — VANCOMYCIN HYDROCHLORIDE 2250 MG: 10 INJECTION, POWDER, LYOPHILIZED, FOR SOLUTION INTRAVENOUS at 11:47

## 2020-10-15 RX ADMIN — VANCOMYCIN HYDROCHLORIDE 2250 MG: 10 INJECTION, POWDER, LYOPHILIZED, FOR SOLUTION INTRAVENOUS at 20:56

## 2020-10-15 RX ADMIN — HYDROMORPHONE HYDROCHLORIDE 0.3 MG: 1 INJECTION, SOLUTION INTRAMUSCULAR; INTRAVENOUS; SUBCUTANEOUS at 00:25

## 2020-10-15 RX ADMIN — LISINOPRIL 10 MG: 10 TABLET ORAL at 08:50

## 2020-10-15 RX ADMIN — OMEPRAZOLE 40 MG: 20 CAPSULE, DELAYED RELEASE ORAL at 08:51

## 2020-10-15 RX ADMIN — Medication 1 MG: at 21:14

## 2020-10-15 RX ADMIN — CEFEPIME 2 G: 2 INJECTION, POWDER, FOR SOLUTION INTRAVENOUS at 09:56

## 2020-10-15 RX ADMIN — CEFEPIME 2 G: 2 INJECTION, POWDER, FOR SOLUTION INTRAVENOUS at 00:02

## 2020-10-15 RX ADMIN — ROCURONIUM BROMIDE 20 MG: 10 INJECTION INTRAVENOUS at 00:11

## 2020-10-15 RX ADMIN — LEVETIRACETAM 1500 MG: 750 TABLET, FILM COATED ORAL at 19:30

## 2020-10-15 RX ADMIN — PROCHLORPERAZINE MALEATE 10 MG: 10 TABLET ORAL at 17:00

## 2020-10-15 RX ADMIN — DIAZEPAM 5 MG: 5 TABLET ORAL at 19:30

## 2020-10-15 RX ADMIN — METRONIDAZOLE 500 MG: 500 INJECTION, SOLUTION INTRAVENOUS at 03:56

## 2020-10-15 RX ADMIN — ESCITALOPRAM 10 MG: 10 TABLET, FILM COATED ORAL at 08:51

## 2020-10-15 RX ADMIN — METHYLPHENIDATE HYDROCHLORIDE 5 MG: 5 TABLET ORAL at 13:24

## 2020-10-15 RX ADMIN — SODIUM CHLORIDE, POTASSIUM CHLORIDE, SODIUM LACTATE AND CALCIUM CHLORIDE: 600; 310; 30; 20 INJECTION, SOLUTION INTRAVENOUS at 00:50

## 2020-10-15 RX ADMIN — DEXAMETHASONE SODIUM PHOSPHATE 2 MG: 4 INJECTION, SOLUTION INTRAMUSCULAR; INTRAVENOUS at 08:51

## 2020-10-15 RX ADMIN — OXYCODONE HYDROCHLORIDE 10 MG: 5 TABLET ORAL at 21:14

## 2020-10-15 RX ADMIN — ESMOLOL HYDROCHLORIDE 20 MG: 10 INJECTION, SOLUTION INTRAVENOUS at 00:13

## 2020-10-15 RX ADMIN — OMEPRAZOLE 40 MG: 20 CAPSULE, DELAYED RELEASE ORAL at 16:05

## 2020-10-15 RX ADMIN — DIAZEPAM 5 MG: 5 TABLET ORAL at 12:03

## 2020-10-15 RX ADMIN — METHYLPHENIDATE HYDROCHLORIDE 5 MG: 5 TABLET ORAL at 08:50

## 2020-10-15 RX ADMIN — ONDANSETRON 4 MG: 4 TABLET, ORALLY DISINTEGRATING ORAL at 16:46

## 2020-10-15 RX ADMIN — SODIUM CHLORIDE: 9 INJECTION, SOLUTION INTRAVENOUS at 01:18

## 2020-10-15 RX ADMIN — LEVETIRACETAM 1500 MG: 750 TABLET, FILM COATED ORAL at 08:50

## 2020-10-15 ASSESSMENT — VISUAL ACUITY
OU: NORMAL ACUITY

## 2020-10-15 ASSESSMENT — ACTIVITIES OF DAILY LIVING (ADL)
ADLS_ACUITY_SCORE: 20

## 2020-10-15 NOTE — OR NURSING
Charting delayed d/t Epic being down for updates. Pt to Pacu from the OR. Pt's vitals stable though BP elevated throughout Pacu stay. Pt sedated and unresponsive upon arrival to Pacu but steadily awoke and at transfer to  was more awake, following commands, answering questions appropriately, and becoming stronger. Pt's left side both upper and lower extremities are weaker than right side. Pt denied n/t and RICKS's. Pt was oriented x4 prior to transfer to . Pt had reported pain in his back and a h/a but upon arrival to , Pt denied this. On 6A, Pt walked to his bed from the hallway and a neuro check was done with 6A RN.

## 2020-10-15 NOTE — ANESTHESIA CARE TRANSFER NOTE
Patient: Gaetano Holley    Procedure(s):  LAPAROSCOPY ASSISTED INSERTION, SHUNT, VENTRICULOPERITONEAL, USING OPTICAL TRACKING SYSTEM    Diagnosis: CSF leak [G96.00]  Diagnosis Additional Information: No value filed.    Anesthesia Type:   General     Note:  Airway :Face Mask  Patient transferred to:PACU  Handoff Report: Identifed the Patient, Identified the Reponsible Provider, Reviewed the pertinent medical history, Discussed the surgical course, Reviewed Intra-OP anesthesia mangement and issues during anesthesia, Set expectations for post-procedure period and Allowed opportunity for questions and acknowledgement of understanding      Vitals: (Last set prior to Anesthesia Care Transfer)    CRNA VITALS  10/15/2020 0017 - 10/15/2020 0054      10/15/2020             Pulse:  97    SpO2:  100 %                Electronically Signed By: Nathaly Jenkins MD  October 15, 2020  12:54 AM

## 2020-10-15 NOTE — PLAN OF CARE
Status: Pt on 6A for CSF leak from head incision. Lumbar drain placed 10/8. R crani and surgical resection of recurrent GBM on 9/23. Now going to OR for Shunt placement.   Vitals: VSS, on ra   Neuros: Oriented x4. Slow to respond to commands. Impulsive. Otherwise intact.   IV: PIV TKO b/t antibiotics.  Diet: NPO.   Bowel status: +bs, no bm  : voiding spontaneously w/ urgency   Skin: Old LD removal site w/ sutures and primipore on top- clear drainage noted around sutured site, told Rosibel RN from PACU and passed information to charge RN. R crani incision w/ sutures, ANDREW, no clear drainage noted.   Pain: c/o HA, tylenol, scheduled robaxin, and PRN valium given  Activity: SBA w/ gb. Impulsive w/ movement/urgency.   Social: Wife at bedside earlier, supportive. Rosibel from PACU said she would call to update wife about procedure.

## 2020-10-15 NOTE — PROGRESS NOTES
Status: Pt  Had a shunt placed 10/14. Pt is leaking CSF on his lumber drain. Pt had a headache and took tylenol   VS: VSS  On RA  Neuros: A&O X4 but  A little slower to respond  GI:  Regular diet  : Incontinent, pt used urinal while getting changed  Respiratory: Clear   Skin: Sutures on the forehead and abdomen. Leaking CSF in the back. Dressing is applied  IV: PIV infusing vanco  Activity: Up with 1 and GB  Pain: Headache  Plan of care:  Keep an eye on the CSF leakage on his lumbar, change dressing as needed. Pt plans to sleep, took valium.

## 2020-10-15 NOTE — PROGRESS NOTES
Using a [employer] shunt , patient's shunt was dialed down from 3 to 2. This was verified 3x.    Polina Norman MD  Neurosurgery Resident PGY-1

## 2020-10-15 NOTE — PLAN OF CARE
Status: Admitted for CSF leak from head incision. Lumbar drain placed 10/8. R crani and surgical resection of recurrent GBM on 9/23. Now POD 0 Left sided ventriculoperitoneal shunt.  Vitals: VSS on RA  Neuros: Alert and oriented x 4. Flat affect and can be slow to respond. Generalized weakness with left side slightly weaker than right- baseline. Can be impulsive but wife has been at bedside today and patient has not been.  IV: PIV tko in between antibiotics.   Resp/trach: LS clear  Diet: Tolerating regular diet  Bowel status: No BM today +Gas  : continent and incontinent of urine- will use urinal   Skin: Head incision ANDREW with sutures,CDI. L. Abdominal incision ANDREW with liquid bandage, CDI. Old lumbar drain site started leaking this AM when patient sat up. Notified NSG NP and came to assess patient. Dressing changed and continue to watch. LD site started draining again and dressing and blanket became wet. NSG NP notified and VPS dialed down by MD. Dressing changed- please continue to monitor for CSF leaking.   Pain: C/O pain in head- managed with tylenol and valium  Activity: Up with SBA- steady on feet  Social: Wife at bedside and attentive to patient  Plan: Continue with plan of care and monitor for leaking at LD site.

## 2020-10-15 NOTE — PROGRESS NOTES
Neurosurgery progress note    S: Had csf leak from cranial incision and LD site shortly after morning rounds, taken for  shunt placement yesterday. Doing well post op, no csf leak since surgery    O:  Temp:  [96.3  F (35.7  C)-98  F (36.7  C)] 98  F (36.7  C)  Pulse:  [60-93] 76  Resp:  [13-20] 13  BP: (105-143)/() 112/81  SpO2:  [95 %-100 %] 98 %    Exam:  NEUROLOGIC:  -- Awake; Alert; oriented x 3  -- Follows commands briskly  -- +repetition, calculation, and naming  -- Speech fluent, spontaneous. No aphasia or dysarthria.  -- Mild L nasolabial flattening     Motor:  Normal bulk / tone; no tremor, rigidity, or bradykinesia.  No muscle wasting or fasciculations  No Pronator Drift  LUE and LLE 4/5 baseline per EMR    Cranio incision appears to be dry    Cultures NGTD    CTH: left ventricular catheter, no hematoma    ASSESSMENT:  is a 50 year old man with a recent 9/23/2020 scheduled right craniotomy and surgical resection for ecurrent GBM with CSF leak from the incision. LD placed on 10/8 incision revised. LD removed on 10/13. Developed CSF leak on 10/14.  shunt placement on 10/14, certas 3.         RECOMMENDATIONS:  - Continue monitoring for csf leak  - Broad spectrm abx for 3 more days csf dosing    Mattie Zaragoza MD  Neurosurgery Resident     Please contact neurosurgery resident on call with questions.    Dial * * [phone], enter [phone] when prompted

## 2020-10-15 NOTE — PLAN OF CARE
Status: Pt on 6A for CSF leak from head incision. Lumbar drain placed 10/8. R crani and surgical resection of recurrent GBM on 9/23. Now POD 0 Left sided ventriculoperitoneal shunt.  Vitals: VSS on RA, briefly required 2L NC for about 1hour after procedure now in mid-high 90's on RA  Neuros: A&x04. Slow to respond to commands, 4-5/5 t/o with L. Side slightly weaker than R., impulsive   IV: L. PIV infusing NS at 75mL/hr  Diet: Regular  Bowel status: Fecal incontinence at times, no BM this shift   : Voiding spontaneously w/ urgency and intermittent incontinence   Skin: Head incision ANDREW with sutures,CDI. L. Abdominal incision ANDREW with liquid bandage, CDI.   Pain: C/o mild HA pain but when asked if he wanted pain meds stated he would like to just rest   Activity: SBA w/ gb. Impulsive w/ movement/urgency.   Plan: Continue to monitor and follow POC.

## 2020-10-15 NOTE — PROGRESS NOTES
Clear drainage from old lumbar site when patient ambulated to bathroom around 1955, primapore applied by 6A RN Reina PERES and brought to PACU for preop, paged to Dr Zaragoza Neurosurg awaiting call back    Dr Zaragoza bedside, visualized small leak from old lumbar drain site, will discuss w Dr Haq - likely place another stitch to site in OR

## 2020-10-15 NOTE — OP NOTE
"[facility]     Operative Note    Pre-operative diagnosis: CSF leak [G96.00]   Post-operative diagnosis * No post-op diagnosis entered *   Procedure: Procedure(s):  LAPAROSCOPY ASSISTED INSERTION, SHUNT, VENTRICULOPERITONEAL, USING OPTICAL TRACKING SYSTEM; Laparoscopic Assisted  shunt placement;  shunt by neurosurgery    Surgeon: Surgeon(s) and Role:     * Raul Haq MD - Primary     * Ayesha Germain MD - Resident - Assisting     * Trey Orellana MD   Anesthesia: General    Estimated blood loss: None   Drains: None   Specimens: ID Type Source Tests Collected by Time Destination   1 :  shunt CSF Cerebral spinal fluid ANAEROBIC CSF CULTURE, CELL COUNT WITH DIFFERENTIAL CSF, CSF CULTURE AEROBIC BACTERIAL, FUNGUS CULTURE, GLUCOSE CSF, GRAM STAIN, PROTEIN TOTAL CSF Raul Haq MD 10/14/2020  8:45 PM       Findings: None;    Complications: None.   Implants: None.       INDICATIONS FOR PROCEDURE  This patient requires a  shunt.  Neurosurgery staff has requested my assistance for the abdominal portion of the shunt procedure and laparoscopy assistance is indicated.      After understanding the risks and benefits of proceeding with a laparoscopic assisted  shunt placement, pt agreed to an operation as outlined by neurosurgery team.    Risks had previously been reviewed with family by the neurosurgery team.    OPERATIVE PROCEDURE: The patient was prepared in routine fashion and under the benefits of general anesthesia, a left upper quadrant Veress needle was inserted and the abdomen was insufflated with carbon dioxide to a maximum pressure of 15 mm Hg.     Findings as noted above.     A counter incision had been made by neurosurgery. I used an 18gauge needle to access the peritoneal cavity. 0.035\" wire was inserted and needle removed. The 10Fr peelaway sheath was passed over the wire. The  shunt tubing was then passed through the " sheath under direct visualization and all of the tubing was left in the peritoneal cavity at direction of neurosurgery.    I then removed the sheath and ensured that the tubing was not kinked.    The dermis was closed over the  shunt with 3-0 vicryl.     Skin incisions were closed using 4-0 monocryl and dermabond.    All sponge and needle counts were correct x2 at the end of the case and the patient tolerated the procedure well.    I was present for all critical components of this case.      Trey Orellana MD      Addendum:  Date of surgery changed to 10/14/20 per Billing

## 2020-10-15 NOTE — PHARMACY-VANCOMYCIN DOSING SERVICE
Pharmacy Vancomycin Initial Note  Date of Service October 15, 2020  Patient's  1970  50 year old, male    Indication: Meningitis, developed CSF leak 10/14 s/p  shunt placement    Current estimated CrCl = Estimated Creatinine Clearance: 174.4 mL/min (based on SCr of 0.66 mg/dL).    Creatinine for last 3 days  10/13/2020:  6:06 AM Creatinine 0.75 mg/dL  10/14/2020:  6:29 AM Creatinine 0.66 mg/dL    Recent Vancomycin Level(s) for last 3 days  10/13/2020:  5:00 PM Vancomycin Level 16.7 mg/L      Vancomycin IV Administrations (past 72 hours)                   vancomycin (VANCOCIN) 2,250 mg in sodium chloride 0.9 % 500 mL intermittent infusion (mg) 2,250 mg New Bag 10/14/20 1827     2,250 mg New Bag  0555     2,250 mg New Bag 10/13/20 1823     2,250 mg New Bag  0545     2,250 mg New Bag 10/12/20 1750                Nephrotoxins and other renal medications (From now, onward)    Start     Dose/Rate Route Frequency Ordered Stop    10/08/20 0800  lisinopril (ZESTRIL) tablet 10 mg      10 mg Oral DAILY 10/07/20 2228            Contrast Orders - past 72 hours (72h ago, onward)    None                Plan:  1.  Pt was initiated on vancomycin on 10/8, most recent regimen was 2250 mg IV q12h (20.6 mg/kg). Vancomycin was discontinued early this morning by primary team, but shortly after that Pharmacy was re-consulted to continue dosing vancomycin. Will resume previous regimen of 2250 mg IV q12h (20.6 mg/kg).   2.  Goal Trough Level: 15-20 mg/L   3.  Pharmacy will check trough levels as appropriate in 1-3 Days.    4.  Serum creatinine levels will be ordered a minimum of twice weekly.      Dallas Joseph, PharmD, BCPS

## 2020-10-15 NOTE — ANESTHESIA PREPROCEDURE EVALUATION
Anesthesia Pre-Procedure Evaluation    Patient: Gaetano Holley   MRN:     [id number] Gender:   male   Age:    50 year old :      1970        Preoperative Diagnosis: CSF leak [G96.00]   Procedure(s):  INSERTION, SHUNT, VENTRICULOPERITONEAL, USING OPTICAL TRACKING SYSTEM         LABS:  CBC:   Lab Results   Component Value Date    WBC 7.0 10/14/2020    WBC 6.5 10/13/2020    HGB 10.3 (L) 10/14/2020    HGB 10.7 (L) 10/13/2020    HCT 31.6 (L) 10/14/2020    HCT 31.8 (L) 10/13/2020     (L) 10/14/2020     (L) 10/13/2020     BMP:   Lab Results   Component Value Date     10/14/2020     10/13/2020    POTASSIUM 4.3 10/14/2020    POTASSIUM 3.3 (L) 10/14/2020    CHLORIDE 114 (H) 10/14/2020    CHLORIDE 113 (H) 10/13/2020    CO2 20 10/14/2020    CO2 22 10/13/2020    BUN 17 10/14/2020    BUN 17 10/13/2020    CR 0.66 10/14/2020    CR 0.75 10/13/2020     (H) 10/14/2020     (H) 10/13/2020     COAGS:   Lab Results   Component Value Date    INR 0.93 10/07/2020     POC:   Lab Results   Component Value Date     (H) 2020     OTHER:   Lab Results   Component Value Date    LACT 1.4 2020    MARGUERITE 8.2 (L) 10/14/2020    PHOS 5.6 (H) 2020    MAG 2.1 2020    ALBUMIN 2.7 (L) 10/14/2020    PROTTOTAL 5.5 (L) 10/14/2020    ALT 61 10/14/2020    AST 18 10/14/2020    ALKPHOS 44 10/14/2020    BILITOTAL 0.3 10/14/2020    CRP 14.0 (H) 10/14/2020    SED 10 10/07/2020        Preop Vitals    BP Readings from Last 3 Encounters:   10/14/20 127/87   20 (!) 122/100    Pulse Readings from Last 3 Encounters:   10/14/20 73   20 86      Resp Readings from Last 3 Encounters:   10/14/20 16   20 18    SpO2 Readings from Last 3 Encounters:   10/14/20 98%   20 98%      Temp Readings from Last 1 Encounters:   10/14/20 36.4  C (97.5  F) (Oral)    Ht Readings from Last 1 Encounters:   10/07/20 1.829 m (6')      Wt Readings from Last 1 Encounters:   10/07/20 108.9 kg (240  lb)    Estimated body mass index is 32.55 kg/m  as calculated from the following:    Height as of 10/7/20: 1.829 m (6').    Weight as of 10/7/20: 108.9 kg (240 lb).     LDA:  Peripheral IV 10/14/20 Anterior;Left Upper arm (Active)   Site Assessment WDL 10/14/20 2000   Line Status Infusing;Checked every 1-2 hour 10/14/20 2000   Phlebitis Scale 0-->no symptoms 10/14/20 2000   Infiltration Scale 0 10/14/20 2000   Infiltration Site Treatment Method  None 10/14/20 1600   If infiltrated, was a Vesicant infusing? No 10/14/20 0421   Number of days: 0        Past Medical History:   Diagnosis Date     Abdominal pain 2008    unspecified      Acute cholecystitis 07/01/2008     Depressive disorder 1998     Erectile dysfunction 2008     Hypertension      Impaired fasting glucose 12/12/2008     Iron deficiency anemia      Seizures (H)       Past Surgical History:   Procedure Laterality Date     CHOLECYSTECTOMY  07/01/2008     colonoscopy biopsy  12/01/2008     MRI CRANIOTOMY WITH OPTICAL TRACKING SYSTEM Right 9/23/2020    Procedure: INTRAOPERATIVE Magnetic Resonance Imaging CRANIOTOMY, USING OPTICAL TRACKING SYSTEM, [facility];  Surgeon: Raul Haq MD;  Location: [facility] OR     right frontal craniotomy  01/07/2020      Allergies   Allergen Reactions     Food Other (See Comments)     Pineapple and pineapple juice causes severe vomiting     Morphine      Sedated but the pain was not relieved       Penicillins Other (See Comments)     Childhood reaction, possible anaphylaxis     Pineapple Nausea and Vomiting        Anesthesia Evaluation     . Pt has had prior anesthetic. Type: General    No history of anesthetic complications          ROS/MED HX    ENT/Pulmonary:     (+)allergic rhinitis, , . .    Neurologic: Comment: Glioma R frontal cortex. On Keppra, dexamethasone.    LUE and LLE weakness; needs cane to help with getting around    (+)seizures    (-) CVA and TIA   Cardiovascular: Comment: On sildenafil for erectile  dysfunction    TTE 8/28/2019:  LV EF 50-55% nl size, no RWMA  RV normal    (+) hypertension (on lisinopril)----. : . . . :. .      (-) arrhythmias, irregular heartbeat/palpitations and valvular problems/murmurs   METS/Exercise Tolerance:  >4 METS   Hematologic:     (+) Anemia, -      Musculoskeletal:         GI/Hepatic:     (+) GERD (on omeprazole)      (-) liver disease   Renal/Genitourinary:  - ROS Renal section negative    (-) renal disease   Endo:     (+) Obesity, .      Psychiatric:     (+) psychiatric history depression (on escitalopram)      Infectious Disease:         Malignancy:   (+) Malignancy (GBM) History of Neuro  Neuro CA Active status post Chemo and Radiation.          Other: Comment: Oxycodone 10mg PRN   (+) H/O Chronic Pain,H/O chronic opiod use ,                        PHYSICAL EXAM:   Mental Status/Neuro: A/A/O   Airway: Facies: Feasible  Mallampati: II  TM distance: > 6 cm  Neck ROM: Full   Respiratory: Auscultation: CTAB     Resp. Rate: Normal     Resp. Effort: Normal      CV: Rhythm: Regular  Heart: Normal Sounds   Comments:      Dental: Normal Dentition                Assessment:   ASA SCORE: 3    H&P: History and physical reviewed and following examination; no interval change.   Smoking Status:  Non-Smoker/Unknown   NPO Status: NPO Appropriate     Plan:   Anes. Type:  General   Pre-Medication: None   Induction:  IV (RSI)   Airway: ETT; Oral   Access/Monitoring: PIV; 2nd PIV   Maintenance: Balanced     Postop Plan:   Postop Pain: Opioids  Postop Sedation/Airway: Not planned  Disposition: Inpatient/Admit     PONV Management:   Adult Risk Factors:, Non-Smoker, Postop Opioids   Prevention: Ondansetron, Dexamethasone     CONSENT: Direct conversation   Plan and risks discussed with: Patient   Blood Products: Consented (ALL Blood Products)                       Nathaly Jenkins MD

## 2020-10-15 NOTE — PROGRESS NOTES
"CLINICAL NUTRITION SERVICES - ASSESSMENT NOTE     Nutrition Prescription    RECOMMENDATIONS FOR MDs/PROVIDERS TO ORDER:  Recommend checking Mg and PO4 when next BMP panel ordered.    Malnutrition Status:    Unable to determine at this time    Recommendations already ordered by Registered Dietitian (RD):  Obtain wt for tomorrow  Enter prn order for ONS     Future/Additional Recommendations:  - Monitor po tolerance and progress postop and need for schedule oral nutritional supplements if consuming < 75% of po intakes.   - If wt loss noted with recheck of wt tomorrow, recommend obtaining calorie counts to help quantify po adequacy and need for additional intervention.     REASON FOR ASSESSMENT  Gaetano Holley is a/an 50 year old male assessed by the dietitian for LOS    CURRENT NUTRITION ORDERS  Diet: adv from NPO (ordered @ 0915 yesterday) to Regular as of 0445 today (post op). Previously on Regular diet as of 10/7.  Intake/Tolerance: Per phone visit with pt this afternoon, reports he is \"eating fine\" and has been trying to consume 100% of his meals x 3 per day.  Commented that he has had some N/V, but has been managed symptoms with antiemetics.    LABS  BUN 8 (low end of normal limits) today; trending downward from value of 28 on 10/7. Decline may be suggestive of declining intake of high protein foods.  No Mg or PO4 ordered yet this admission    MEDICATIONS  Medications reviewed    ANTHROPOMETRICS  Height: 182.9 cm (6' 0\")  Most Recent Weight: 108.9 kg (240 lb) - admit wt on 10/7. No additional wts obtained since admission  IBW: 80.9 kg  BMI: Obesity Grade I BMI 30-34.9  Weight History:   Wt Readings from Last 10 Encounters:   10/07/20 108.9 kg (240 lb)   09/25/20 104 kg (229 lb 4.5 oz)       Dosing Weight: 88 kg (ajusted based on admit wt and IBW)    ASSESSED NUTRITION NEEDS  Estimated Energy Needs: 9016-2986 kcals/day (20 - 25 kcals/kg)  Justification: Maintenance  Estimated Protein Needs: 105-130 grams " protein/day (1.2 - 1.5 grams of pro/kg)  Justification: Increased needs for Post-op healing and recovery  Estimated Fluid Needs: (1 mL/kcal)   Justification: Maintenance    PHYSICAL FINDINGS  See malnutrition section below.    MALNUTRITION  % Intake: No decreased intake noted  % Weight Loss: Unable to assess  Subcutaneous Fat Loss: Unable to assess  Muscle Loss: Unable to assess. No muscle wasting per MD note on 10/8  Fluid Accumulation/Edema: None noted per chart review  Malnutrition Diagnosis: Unable to determine due to unable to complete all parameters of nutrition assessment.    NUTRITION DIAGNOSIS  Predicted inadequate nutrient intake (protein-calorie) related to BUN WNL, but trending downward over past week, despite pt reporting eating well x 3 meals per day and N/V controlled with antimetics        INTERVENTIONS  Implementation  Nutrition Education: Provided education on continuing to consume good po intakes at meals, angelica[ecially of protein sources to help with post op healing and recovery.  Medical food supplement therapy - as outlined above  Collaborated with RN regarding pt's po intakes and informed that pt at well this morning.    Goals  Patient to consume % of nutritionally adequate meal trays TID, or the equivalent with supplements/snacks.     Monitoring/Evaluation  Progress toward goals will be monitored and evaluated per protocol.  Sonal Dhillon RD,LD  6A pager [phone]

## 2020-10-15 NOTE — PLAN OF CARE
Status: Pt on 6A for CSF leak from head incision. Lumbar drain placed 10/8. R crani and surgical resection of recurrent GBM on 9/23. Now POD 0 Left sided ventriculoperitoneal shunt.  Vitals: VSS on RA, briefly required 2L NC for about 1hour after procedure now in mid-high 90's on RA  Neuros: A&x04. Slow to respond to commands, 4-5/5 t/o with L. Side slightly weaker than R., impulsive   IV: L. PIV infusing NS at 75mL/hr  Diet: Regular  Bowel status: Fecal incontinence at times, no BM this shift   : Voiding spontaneously w/ urgency and intermittent incontinence   Skin: Head incision ANDREW with sutures,CDI. L. Abdominal incision ADNREW with liquid bandage, CDI. Old LD ANDREW, no drainage noted. Sacral mepilex in place, CDI.   Pain: C/o mild HA pain but when asked if he wanted pain meds stated he would like to just rest   Activity: SBA w/ gb. Impulsive w/ movement/urgency.   Plan: Continue to monitor and follow POC.

## 2020-10-15 NOTE — OP NOTE
Name:  Gaetano Holley  MRN:  [id number]  Date of Birth: .1970  Date of Surgery:  October 14, 2020     Pre-operative Diagnosis:  Hydrocephalus  Post-operative Diagnosis:   Same  Procedure:    1) Left sided ventriculoperitoneal shunt, new  2) Neuro-navigation assisted ventricular catheter placement.    Anesthesia:  GETA    Surgeon: Raul Lacey MD, PhD  Assistant Surgeon: Trey Orellana MD    Assistant: Ayesha Germain MD     Indication: 50 M s/p GTR of a right frontal glioblastoma with CSF egression from wound refractory to a short course of lumbar drain. The patient presents for a left frontal VPS placement. Because the patient had underwent previous abdominal surgery, assistant from general surgery was needed for placement of the peritoneal catheter.     Description of Procedure: After pre-operative laboratory value and informed consent were verified, the patient was brought into the operating room. The patient underwent general anesthesia and intubation. Antibiotic was administered.     The patient was placed in a supine position, with the head turned to the right, exposing the left frontal cranium.  The patient's anatomy was registered relative to a pre-operative CT using the [employer] system.     The area encompassing the left frontal region encompassing the ideal trajectory to the ventricle as well as the left neck, left chest, and right abdominal regions were also prepped and draped.      Time out was performed. The incision was extended from the  previous cranial incision using a 10 scalpel. Hemostasis was achieved using bipolar cautery. A cristine hole was made centered at the planned ventricular entry point.        The abdominal incision was made with a 10 scalpel and the peritoneum was exposed. Tunneling between this exposure and the cranial incision was performed. The shunt catheter was passed caudal-rostrally. The distal catheter with the valve complex (Codman, set at 3) was tunneled.  The proximal  catheter was placed into the ventricular space under [employer] guidance.  Clear CSF was obtained on first pass. The ventricular catheter was then connected to the valve.  All connections between the valve and the catheter were secured with 2'0 silk sutures.    The distal catheter was inserted into the peritoneum by Dr. Orellana and will be dictated in a separate note.     After hemostasis, I turned my attention to the closure.  All incision sites were amply irrigated. The abdomin was closed in layers using 2 and 3'0 vicryl. The galea was closed with 2'0 vicryl. The skin was closed with 3'0 Monocryl. Exofin was applied.     I was present and performed the key portions of the procedure.     EBL: 10 cc's    Specimens: None     Disposition: PACU

## 2020-10-16 ENCOUNTER — APPOINTMENT (OUTPATIENT)
Dept: PHYSICAL THERAPY | Facility: CLINIC | Age: 50
DRG: 031 | End: 2020-10-16
Payer: COMMERCIAL

## 2020-10-16 LAB
ALBUMIN SERPL-MCNC: 2.8 G/DL (ref 3.4–5)
ALP SERPL-CCNC: 46 U/L (ref 40–150)
ALT SERPL W P-5'-P-CCNC: 65 U/L (ref 0–70)
ANION GAP SERPL CALCULATED.3IONS-SCNC: 4 MMOL/L (ref 3–14)
AST SERPL W P-5'-P-CCNC: 15 U/L (ref 0–45)
BILIRUB SERPL-MCNC: 0.4 MG/DL (ref 0.2–1.3)
BUN SERPL-MCNC: 14 MG/DL (ref 7–30)
CALCIUM SERPL-MCNC: 8.5 MG/DL (ref 8.5–10.1)
CHLORIDE SERPL-SCNC: 113 MMOL/L (ref 94–109)
CO2 SERPL-SCNC: 25 MMOL/L (ref 20–32)
CREAT SERPL-MCNC: 0.61 MG/DL (ref 0.66–1.25)
CRP SERPL-MCNC: 19 MG/L (ref 0–8)
ERYTHROCYTE [DISTWIDTH] IN BLOOD BY AUTOMATED COUNT: 13.8 % (ref 10–15)
GFR SERPL CREATININE-BSD FRML MDRD: >90 ML/MIN/{1.73_M2}
GLUCOSE SERPL-MCNC: 124 MG/DL (ref 70–99)
HCT VFR BLD AUTO: 31.8 % (ref 40–53)
HGB BLD-MCNC: 10.5 G/DL (ref 13.3–17.7)
MCH RBC QN AUTO: 31 PG (ref 26.5–33)
MCHC RBC AUTO-ENTMCNC: 33 G/DL (ref 31.5–36.5)
MCV RBC AUTO: 94 FL (ref 78–100)
PLATELET # BLD AUTO: 143 10E9/L (ref 150–450)
POTASSIUM SERPL-SCNC: 3.8 MMOL/L (ref 3.4–5.3)
PROT SERPL-MCNC: 5.6 G/DL (ref 6.8–8.8)
RBC # BLD AUTO: 3.39 10E12/L (ref 4.4–5.9)
SODIUM SERPL-SCNC: 142 MMOL/L (ref 133–144)
VANCOMYCIN SERPL-MCNC: 15.2 MG/L
WBC # BLD AUTO: 9.2 10E9/L (ref 4–11)

## 2020-10-16 PROCEDURE — 250N000013 HC RX MED GY IP 250 OP 250 PS 637: Performed by: STUDENT IN AN ORGANIZED HEALTH CARE EDUCATION/TRAINING PROGRAM

## 2020-10-16 PROCEDURE — 250N000011 HC RX IP 250 OP 636: Performed by: STUDENT IN AN ORGANIZED HEALTH CARE EDUCATION/TRAINING PROGRAM

## 2020-10-16 PROCEDURE — 97750 PHYSICAL PERFORMANCE TEST: CPT | Mod: GP

## 2020-10-16 PROCEDURE — 80202 ASSAY OF VANCOMYCIN: CPT | Performed by: NEUROLOGICAL SURGERY

## 2020-10-16 PROCEDURE — 258N000003 HC RX IP 258 OP 636: Performed by: NEUROLOGICAL SURGERY

## 2020-10-16 PROCEDURE — 80053 COMPREHEN METABOLIC PANEL: CPT | Performed by: STUDENT IN AN ORGANIZED HEALTH CARE EDUCATION/TRAINING PROGRAM

## 2020-10-16 PROCEDURE — 97116 GAIT TRAINING THERAPY: CPT | Mod: GP

## 2020-10-16 PROCEDURE — 250N000012 HC RX MED GY IP 250 OP 636 PS 637: Performed by: NURSE PRACTITIONER

## 2020-10-16 PROCEDURE — 85027 COMPLETE CBC AUTOMATED: CPT | Performed by: STUDENT IN AN ORGANIZED HEALTH CARE EDUCATION/TRAINING PROGRAM

## 2020-10-16 PROCEDURE — 120N000002 HC R&B MED SURG/OB UMMC

## 2020-10-16 PROCEDURE — 250N000011 HC RX IP 250 OP 636: Performed by: NEUROLOGICAL SURGERY

## 2020-10-16 PROCEDURE — 86140 C-REACTIVE PROTEIN: CPT | Performed by: STUDENT IN AN ORGANIZED HEALTH CARE EDUCATION/TRAINING PROGRAM

## 2020-10-16 PROCEDURE — 36415 COLL VENOUS BLD VENIPUNCTURE: CPT | Performed by: NEUROLOGICAL SURGERY

## 2020-10-16 PROCEDURE — 36415 COLL VENOUS BLD VENIPUNCTURE: CPT | Performed by: STUDENT IN AN ORGANIZED HEALTH CARE EDUCATION/TRAINING PROGRAM

## 2020-10-16 RX ADMIN — OMEPRAZOLE 40 MG: 20 CAPSULE, DELAYED RELEASE ORAL at 16:16

## 2020-10-16 RX ADMIN — ACETAMINOPHEN 650 MG: 325 TABLET, FILM COATED ORAL at 16:16

## 2020-10-16 RX ADMIN — DEXAMETHASONE 2 MG: 2 TABLET ORAL at 08:45

## 2020-10-16 RX ADMIN — CEFEPIME 2 G: 2 INJECTION, POWDER, FOR SOLUTION INTRAVENOUS at 16:16

## 2020-10-16 RX ADMIN — CEFEPIME 2 G: 2 INJECTION, POWDER, FOR SOLUTION INTRAVENOUS at 00:32

## 2020-10-16 RX ADMIN — OMEPRAZOLE 40 MG: 20 CAPSULE, DELAYED RELEASE ORAL at 08:44

## 2020-10-16 RX ADMIN — ESCITALOPRAM 10 MG: 10 TABLET, FILM COATED ORAL at 08:45

## 2020-10-16 RX ADMIN — VANCOMYCIN HYDROCHLORIDE 2250 MG: 10 INJECTION, POWDER, LYOPHILIZED, FOR SOLUTION INTRAVENOUS at 11:06

## 2020-10-16 RX ADMIN — METHYLPHENIDATE HYDROCHLORIDE 5 MG: 5 TABLET ORAL at 08:49

## 2020-10-16 RX ADMIN — CEFEPIME 2 G: 2 INJECTION, POWDER, FOR SOLUTION INTRAVENOUS at 23:28

## 2020-10-16 RX ADMIN — CEFEPIME 2 G: 2 INJECTION, POWDER, FOR SOLUTION INTRAVENOUS at 08:46

## 2020-10-16 RX ADMIN — DEXAMETHASONE 2 MG: 2 TABLET ORAL at 19:16

## 2020-10-16 RX ADMIN — ACETAMINOPHEN 650 MG: 325 TABLET, FILM COATED ORAL at 11:06

## 2020-10-16 RX ADMIN — LISINOPRIL 10 MG: 10 TABLET ORAL at 08:45

## 2020-10-16 RX ADMIN — METHYLPHENIDATE HYDROCHLORIDE 5 MG: 5 TABLET ORAL at 12:40

## 2020-10-16 RX ADMIN — LEVETIRACETAM 1500 MG: 750 TABLET, FILM COATED ORAL at 08:45

## 2020-10-16 RX ADMIN — Medication 1 MG: at 23:27

## 2020-10-16 RX ADMIN — LEVETIRACETAM 1500 MG: 750 TABLET, FILM COATED ORAL at 19:16

## 2020-10-16 ASSESSMENT — ACTIVITIES OF DAILY LIVING (ADL)
ADLS_ACUITY_SCORE: 20

## 2020-10-16 ASSESSMENT — VISUAL ACUITY
OU: NORMAL ACUITY
OU: NORMAL ACUITY

## 2020-10-16 NOTE — PROGRESS NOTES
Status: Pt on 6A for CSF leak from head incision. Lumbar drain placed 10/8. R crani and surgical resection of recurrent GBM on 9/23. Now POD 1 Left sided ventriculoperitoneal shunt.  Vitals: VSS on RA  Neuros: A&x04. Responds to commands, slowly. Left sided weakness.   IV: R. PIV infusing.   Diet: Regular  Bowel status: Up for BM this shift.    : Intermittent incontinence    Skin: Head incision ANDREW with sutures. MD placed dressing on R head incision this AM, and serous drainage was noted. Abdominal incision ANDREW with liquid bandage, CDI. Old LD site with primapore dressing, CDI.   Pain: C/o HA pain 7/10, asleep after Tylenol see MAR.   Activity: SBA w/ gb.  Plan: Continue to monitor and follow POC.

## 2020-10-16 NOTE — PLAN OF CARE
Status: POD#0 Left sided ventriculoperitoneal shunt. Recent history of R crani and surgical resection of recurrent GBM on 9/23. Pt admitted to 6A (10/7) for CSF leak from R head incision.    Vitals: VSS on RA  Neuros: A&O x4. Strength 5/5, L side slightly weaker than R side. Slow to respond. Pt became much more alert after Old LD site was reinforced.   IV: PIV infusing TKO btw abx.  Diet: Regular  Bowel status: Large, loose BM this shift. Pt was able to ambulate to toilet.  : Voiding spontaneously. No urine incontinence this shift.   Skin: L abdominal incision WDL, ANDREW. Head incision WDL, ANDREW. Old LD site, sutured and covered with primapore dressing. LD site dressing changed x1 d/t CSF leakage, provider reinforced site with sutures and redressed with primapore.     Pain: Head and neck pain managed with PRN tylenol, oxy, valium.   Activity: SBA. Pt complained of nausea when up with PT this shift. PRN zofran and compazine administered.  Social: Wife at bedside, supportive of cares.   Plan: Continue to monitor for CSF leakage from LD site. Continue to follow POC

## 2020-10-16 NOTE — PLAN OF CARE
OT - Cx.  Patient sleeping upon arrival, wife present.  Patient would not open eyes for therapist or wife's encouragement.  Wife reports busy few days for patient and he is very fatigued.

## 2020-10-16 NOTE — PROGRESS NOTES
Dynamic Gait Index (DGI):The DGI is a measure of balance during gait that is reliable and valid for the elderly and individuals with Parkinson's disease, MS, vestibular disorders, or s/p stroke. Gait assistive device used: None    Patient score: 18/24  Scores ?19/24 indicate an increased risk for falls according to Alexandrea et al 2000  Minimal Detectable Change = 2.9 in community dwelling elderly according to Carmelo et al 2011    Assessment (rationale for performing, application to patient s function & care plan): DGI administered in order to assess falls risk and aid in discharge planning.  18/24 indicating minor risk for falls.      Minutes billed as physical performance test: 10

## 2020-10-16 NOTE — PROGRESS NOTES
Progress Note Neurosurgery     Shunt setting (Certas valve) was found @2. Changed to 1.    Patient tolerated the procedure well.    Johnny Gilbert MD  Neurosurgery Resident PGY-2

## 2020-10-16 NOTE — PLAN OF CARE
Status: Pt on 6A for CSF leak from head incision. Lumbar drain placed 10/8. R crani and surgical resection of recurrent GBM on 9/23. Now POD 1 Left sided ventriculoperitoneal shunt.  Vitals: VSS on RA  Neuros: A&x04. Slow to respond to commands, 4-5/5 t/o with L. Side slightly weaker than R., impulsive   IV: R. PIV SL between infusions  Diet: Regular  Bowel status: Fecal incontinence at times d/t urgency, no BM this shift   : Voiding spontaneously w/ urgency and intermittent incontinence   Skin: Head incision ANDREW with sutures,CDI. Abdominal incision ANDREW with liquid bandage, CDI. Old LD site with primapore dressing, CDI.   Pain: C/o mild HA pain but when asked if he wanted pain meds stated he would like to just rest   Activity: SBA w/ gb. Impulsive w/ movement/urgency.   Plan: Continue to monitor and follow POC.

## 2020-10-16 NOTE — PROGRESS NOTES
Neurosurgery progress note    S: Had csf leak from LD site required additional nylon stitch last night, no leakage since oversaw stitches    O:  Temp:  [96.5  F (35.8  C)-98  F (36.7  C)] 97.9  F (36.6  C)  Pulse:  [] 114  Resp:  [13-20] 18  BP: (100-143)/() 100/52  SpO2:  [94 %-100 %] 94 %    Exam:  NEUROLOGIC:  -- Awake; Alert; oriented x 3  -- Follows commands briskly  -- +repetition, calculation, and naming  -- Speech fluent, spontaneous. No aphasia or dysarthria.  -- Mild L nasolabial flattening     Motor:  Normal bulk / tone; no tremor, rigidity, or bradykinesia.  No muscle wasting or fasciculations  No Pronator Drift  LUE and LLE 4/5 baseline per EMR      ASSESSMENT:  is a 50 year old man with a recent 9/23/2020 scheduled right craniotomy and surgical resection for ecurrent GBM with CSF leak from the incision. LD placed on 10/8 incision revised. LD removed on 10/13. Developed CSF leak on 10/14.  shunt placement on 10/14, certas 3. Had LD site leak, shunt dialed down to certas 2 and site oversewn on 10/15.        RECOMMENDATIONS:  - Continue monitoring for csf leak  - Broad spectrm abx for 3 more days csf dosing  -Hypokalemia required replacedment    Mattie Zaragoza MD  Neurosurgery Resident     Please contact neurosurgery resident on call with questions.    Dial * * [phone], enter [phone] when prompted

## 2020-10-16 NOTE — PLAN OF CARE
Status: Pt with known frontal GBM with previous crani or resction 9/23 admitted for CSF leak from head incision. Lumbar drain placed 10/8. Now POD#2  shunt placement   Vitals: VSS on RA  Neuros: A+Ox4. 5/5 R side, 4/5 LLE, 5-/5 on LUE. Slow to respond. Slow speech. Flat affect. Denies severe HA/metalic taste   IV: R. PIV SL between infusions  Diet: Regular  Bowel status: Had BM today, continent but can have intermittent incontinence   : Voiding spontaneously w/ urgency and intermittent incontinence   Skin: Head incision has primpaore d/t leaking. d LD site with primapore dressing, scant drainage today, MD changed dressing this AM. MD placed primapore on head today and is aware of slight leaking. Abdominal incision with liquid bandage and WNL  Pain: Slight HA controlled with tylenol   Activity: A1+GB. can be impulsive, keep BA on   Plan: Continue to monitor incisions for leaking and follow POC.

## 2020-10-16 NOTE — PHARMACY-VANCOMYCIN DOSING SERVICE
Pharmacy Vancomycin Note  Date of Service 2020  Patient's  1970   50 year old, male    Indication: Meningitis  Goal Trough Level: 15-20 mg/L  Day of Therapy: Started 10/8/20  Current Vancomycin regimen:  2250 mg IV q12h    Current estimated CrCl = Estimated Creatinine Clearance: 188.7 mL/min (A) (based on SCr of 0.61 mg/dL (L)).    Creatinine for last 3 days  10/14/2020:  6:29 AM Creatinine 0.66 mg/dL  10/15/2020:  8:24 AM Creatinine 0.61 mg/dL  10/16/2020:  7:09 AM Creatinine 0.61 mg/dL    Recent Vancomycin Levels (past 3 days)  10/13/2020:  5:00 PM Vancomycin Level 16.7 mg/L  10/16/2020: 10:20 AM Vancomycin Level 15.2 mg/L This was a 13.33hr level in a 12 hour dosing regimen, extrapolated trough ~ 17    Vancomycin IV Administrations (past 72 hours)                   vancomycin (VANCOCIN) 2,250 mg in sodium chloride 0.9 % 500 mL intermittent infusion (mg) 2,250 mg New Bag 10/16/20 1106     2,250 mg New Bag 10/15/20 2056     2,250 mg New Bag  1147    vancomycin (VANCOCIN) 2,250 mg in sodium chloride 0.9 % 500 mL intermittent infusion (mg) 2,250 mg New Bag 10/14/20 1827     2,250 mg New Bag  0555     2,250 mg New Bag 10/13/20 1823                Nephrotoxins and other renal medications (From now, onward)    Start     Dose/Rate Route Frequency Ordered Stop    10/15/20 0900  vancomycin (VANCOCIN) 2,250 mg in sodium chloride 0.9 % 500 mL intermittent infusion      2,250 mg  over 2 Hours Intravenous EVERY 12 HOURS 10/15/20 0832      10/08/20 0800  lisinopril (ZESTRIL) tablet 10 mg      10 mg Oral DAILY 10/07/20 2228               Contrast Orders - past 72 hours (72h ago, onward)    None          Interpretation of levels and current regimen:  Trough level is  Therapeutic    Has serum creatinine changed > 50% in last 72 hours: No    Renal Function: Stable    Plan:  1.  Continue Current Dose  2.  Pharmacy will check trough levels as appropriate in 3-5 Days.    3. Serum creatinine levels will be ordered  daily for the first week of therapy and at least twice weekly for subsequent weeks.      Dallas Miller PharmD, [facility]    [phone]  Pager [phone]          .

## 2020-10-17 ENCOUNTER — APPOINTMENT (OUTPATIENT)
Dept: CT IMAGING | Facility: CLINIC | Age: 50
DRG: 031 | End: 2020-10-17
Attending: NURSE PRACTITIONER
Payer: COMMERCIAL

## 2020-10-17 ENCOUNTER — APPOINTMENT (OUTPATIENT)
Dept: OCCUPATIONAL THERAPY | Facility: CLINIC | Age: 50
DRG: 031 | End: 2020-10-17
Payer: COMMERCIAL

## 2020-10-17 LAB
ALBUMIN SERPL-MCNC: 2.8 G/DL (ref 3.4–5)
ALP SERPL-CCNC: 50 U/L (ref 40–150)
ALT SERPL W P-5'-P-CCNC: 60 U/L (ref 0–70)
ANION GAP SERPL CALCULATED.3IONS-SCNC: 7 MMOL/L (ref 3–14)
AST SERPL W P-5'-P-CCNC: 11 U/L (ref 0–45)
BACTERIA SPEC CULT: NO GROWTH
BILIRUB SERPL-MCNC: 0.4 MG/DL (ref 0.2–1.3)
BUN SERPL-MCNC: 19 MG/DL (ref 7–30)
CALCIUM SERPL-MCNC: 8.6 MG/DL (ref 8.5–10.1)
CHLORIDE SERPL-SCNC: 111 MMOL/L (ref 94–109)
CO2 SERPL-SCNC: 24 MMOL/L (ref 20–32)
CREAT SERPL-MCNC: 0.63 MG/DL (ref 0.66–1.25)
CRP SERPL-MCNC: 18 MG/L (ref 0–8)
ERYTHROCYTE [DISTWIDTH] IN BLOOD BY AUTOMATED COUNT: 13.5 % (ref 10–15)
GFR SERPL CREATININE-BSD FRML MDRD: >90 ML/MIN/{1.73_M2}
GLUCOSE SERPL-MCNC: 135 MG/DL (ref 70–99)
HCT VFR BLD AUTO: 29 % (ref 40–53)
HGB BLD-MCNC: 9.8 G/DL (ref 13.3–17.7)
Lab: NORMAL
MCH RBC QN AUTO: 30.9 PG (ref 26.5–33)
MCHC RBC AUTO-ENTMCNC: 33.8 G/DL (ref 31.5–36.5)
MCV RBC AUTO: 92 FL (ref 78–100)
PLATELET # BLD AUTO: 132 10E9/L (ref 150–450)
POTASSIUM SERPL-SCNC: 3.6 MMOL/L (ref 3.4–5.3)
PROT SERPL-MCNC: 6.1 G/DL (ref 6.8–8.8)
RBC # BLD AUTO: 3.17 10E12/L (ref 4.4–5.9)
SODIUM SERPL-SCNC: 142 MMOL/L (ref 133–144)
SPECIMEN SOURCE: NORMAL
WBC # BLD AUTO: 8.3 10E9/L (ref 4–11)

## 2020-10-17 PROCEDURE — 250N000011 HC RX IP 250 OP 636: Performed by: NEUROLOGICAL SURGERY

## 2020-10-17 PROCEDURE — 86140 C-REACTIVE PROTEIN: CPT | Performed by: STUDENT IN AN ORGANIZED HEALTH CARE EDUCATION/TRAINING PROGRAM

## 2020-10-17 PROCEDURE — 250N000013 HC RX MED GY IP 250 OP 250 PS 637: Performed by: STUDENT IN AN ORGANIZED HEALTH CARE EDUCATION/TRAINING PROGRAM

## 2020-10-17 PROCEDURE — 258N000003 HC RX IP 258 OP 636: Performed by: NEUROLOGICAL SURGERY

## 2020-10-17 PROCEDURE — 97530 THERAPEUTIC ACTIVITIES: CPT | Mod: GO

## 2020-10-17 PROCEDURE — 85027 COMPLETE CBC AUTOMATED: CPT | Performed by: STUDENT IN AN ORGANIZED HEALTH CARE EDUCATION/TRAINING PROGRAM

## 2020-10-17 PROCEDURE — 70450 CT HEAD/BRAIN W/O DYE: CPT

## 2020-10-17 PROCEDURE — 80053 COMPREHEN METABOLIC PANEL: CPT | Performed by: STUDENT IN AN ORGANIZED HEALTH CARE EDUCATION/TRAINING PROGRAM

## 2020-10-17 PROCEDURE — 250N000012 HC RX MED GY IP 250 OP 636 PS 637: Performed by: NURSE PRACTITIONER

## 2020-10-17 PROCEDURE — 120N000002 HC R&B MED SURG/OB UMMC

## 2020-10-17 PROCEDURE — 70450 CT HEAD/BRAIN W/O DYE: CPT | Mod: 26 | Performed by: RADIOLOGY

## 2020-10-17 PROCEDURE — 250N000011 HC RX IP 250 OP 636: Performed by: STUDENT IN AN ORGANIZED HEALTH CARE EDUCATION/TRAINING PROGRAM

## 2020-10-17 PROCEDURE — 36415 COLL VENOUS BLD VENIPUNCTURE: CPT | Performed by: STUDENT IN AN ORGANIZED HEALTH CARE EDUCATION/TRAINING PROGRAM

## 2020-10-17 RX ADMIN — METHYLPHENIDATE HYDROCHLORIDE 5 MG: 5 TABLET ORAL at 09:17

## 2020-10-17 RX ADMIN — LEVETIRACETAM 1500 MG: 750 TABLET, FILM COATED ORAL at 08:51

## 2020-10-17 RX ADMIN — DEXAMETHASONE 2 MG: 2 TABLET ORAL at 08:50

## 2020-10-17 RX ADMIN — DEXAMETHASONE 2 MG: 2 TABLET ORAL at 20:28

## 2020-10-17 RX ADMIN — ACETAMINOPHEN 650 MG: 325 TABLET, FILM COATED ORAL at 17:58

## 2020-10-17 RX ADMIN — OXYCODONE HYDROCHLORIDE 5 MG: 5 TABLET ORAL at 20:28

## 2020-10-17 RX ADMIN — ESCITALOPRAM 10 MG: 10 TABLET, FILM COATED ORAL at 08:51

## 2020-10-17 RX ADMIN — METHYLPHENIDATE HYDROCHLORIDE 5 MG: 5 TABLET ORAL at 13:30

## 2020-10-17 RX ADMIN — OMEPRAZOLE 40 MG: 20 CAPSULE, DELAYED RELEASE ORAL at 16:07

## 2020-10-17 RX ADMIN — CEFEPIME 2 G: 2 INJECTION, POWDER, FOR SOLUTION INTRAVENOUS at 08:43

## 2020-10-17 RX ADMIN — VANCOMYCIN HYDROCHLORIDE 2250 MG: 10 INJECTION, POWDER, LYOPHILIZED, FOR SOLUTION INTRAVENOUS at 11:23

## 2020-10-17 RX ADMIN — ACETAMINOPHEN 650 MG: 325 TABLET, FILM COATED ORAL at 08:39

## 2020-10-17 RX ADMIN — VANCOMYCIN HYDROCHLORIDE 2250 MG: 10 INJECTION, POWDER, LYOPHILIZED, FOR SOLUTION INTRAVENOUS at 01:05

## 2020-10-17 RX ADMIN — LISINOPRIL 10 MG: 10 TABLET ORAL at 08:48

## 2020-10-17 RX ADMIN — OMEPRAZOLE 40 MG: 20 CAPSULE, DELAYED RELEASE ORAL at 08:41

## 2020-10-17 RX ADMIN — CEFEPIME 2 G: 2 INJECTION, POWDER, FOR SOLUTION INTRAVENOUS at 23:41

## 2020-10-17 RX ADMIN — LEVETIRACETAM 1500 MG: 750 TABLET, FILM COATED ORAL at 20:28

## 2020-10-17 RX ADMIN — CEFEPIME 2 G: 2 INJECTION, POWDER, FOR SOLUTION INTRAVENOUS at 16:07

## 2020-10-17 ASSESSMENT — ACTIVITIES OF DAILY LIVING (ADL)
ADLS_ACUITY_SCORE: 20

## 2020-10-17 NOTE — DISCHARGE SUMMARY
[facility] Discharge Summary and Instructions    Gaetano Holley MRN# [id number]   Age: 50 year old YOB: 1970     Date of Admission:  10/7/2020  Date of Discharge::  10/19/2020 11:30 AM  Admitting Physician:  Raul Haq MD  Discharge Physician:  Raul Haq MD          Admission Diagnoses:   CSF leak [G96.00]          Discharge Diagnosis:     CSF leak [G96.00]          Procedures:   10/8 Lumbar drain  10/14 left  shunt           Brief History of Illness:   Mr. Gaetano Holley is a 50 year-old male with past medical history significant for right frontal glioblastoma (5.5 x 5.0 cm), initially diagnosed in 1/2020 after presenting with seizure an on 9/23/2020 for scheduled right craniotomy and surgical resection for ecurrent GBM. After discharge to rehab he developed CSF leak from his incision on 10/6. On 10/7 he presented to the ED and was admitted for further management.             Hospital Course:   Mr. Holley was admitted on 10/6 night for lumbar drain placement for CSF diversion. The wound was revised and lumbar drain was placed. He was drained for 5 days on 10/13, after he rolled over the lumbar drain snapped and broke. It was removed and the tip found to be intact. CT T and L spine did not show retained fragment. He was closely observed for another day. However, he leaked csf again from his cranial incision and later from his LD site as well on 10/15, and he was take for left  shunt placement to treat hydrocephalus. On 10/15 afternoon he was found to have csf leakage from his lumbar drain site again, and two additional nylon stitches were placed and the shunt was dialed down to certas 2. On 10/16, he leaked csf from the cranial incision again, and the shunt was dialed down to certas 1.  Patient's cranial and lumbar incisions were dry prior to discharge.    Physical therapy evaluated patient prior to discharge and recommended outpatient PT.   Infectious  Disease followed patient throughout his stay, initially was receiving Vancomycin, Flagyl, and Cefepime.  Patient completed course prior to discharge.       Exam at discharge:  O:  Temp:  [95.8  F (35.4  C)-96.8  F (36  C)] 96.8  F (36  C)  Pulse:  [] 99  Resp:  [16-18] 16  BP: ()/(64-89) 99/64  SpO2:  [96 %-97 %] 96 %     Exam:  NEUROLOGIC:  -- Awake; Alert; oriented x 3  -- Follows commands briskly  -- +repetition, calculation, and naming  -- Speech fluent, spontaneous. No aphasia or dysarthria.  -- Mild L nasolabial flattening     Motor:  Normal bulk / tone; no tremor, rigidity, or bradykinesia.  No muscle wasting or fasciculations  No Pronator Drift  LUE and LLE 4/5 baseline     Cranial and LD drain site dressings both c/d/i         Discharge Medications:     Current Discharge Medication List      CONTINUE these medications which have NOT CHANGED    Details   ciclopirox (LOPROX) 0.77 % cream Apply topically as needed  Qty:      Comments: Antifungal      !! dexamethasone (DECADRON) 1 MG tablet Take 1 tablet (1 mg) by mouth every 8 hours  Qty: 12 tablet, Refills: 0    Associated Diagnoses: Glioblastoma (H)      !! dexamethasone (DECADRON) 1.5 MG tablet Take 2 tablets (3 mg) by mouth every 8 hours  Qty: 24 tablet, Refills: 0    Associated Diagnoses: Glioblastoma (H)      !! dexamethasone (DECADRON) 2 MG tablet Take 1 tablet (2 mg) by mouth every 8 hours  Qty: 15 tablet, Refills: 0    Associated Diagnoses: Glioblastoma (H)      escitalopram (LEXAPRO) 10 MG tablet Take 1 tablet (10 mg) by mouth daily  Qty:      Comments: Antidepressant      fluticasone (FLONASE) 50 MCG/ACT nasal spray Spray 50 sprays into both nostrils daily  Qty:      Comments: Topical steroid      Heparin Sodium, Porcine, (HEPARIN ANTICOAGULANT) 5000 UNIT/0.5ML injection Inject 0.5 mLs (5,000 Units) Subcutaneous every 8 hours  Qty:      Comments: DVT ppx    Ok to discontinue once patient is mobilizing at baseline      levETIRAcetam  (KEPPRA) 750 MG tablet Take 1 tablet (750 mg) by mouth 2 times daily  Qty:      Comments: Seizure      lisinopril (ZESTRIL) 10 MG tablet Take 1 tablet (10 mg) by mouth daily  Qty:      Comments: HTN      methylphenidate (RITALIN) 5 MG tablet Take 5 mg by mouth 2 times daily Pt takes ritalin x1 in AM and then 4hrs afterwards  Refills: 0    Comments: Stimulant      omeprazole (PRILOSEC) 20 MG DR capsule TAKE 2 CAPSULES BY MOUTH ONCE DAILY BEFORE MEAL(S) -  DO  NOT  CRUSH  Qty:      Comments: GI ppx      ondansetron (ZOFRAN) 8 MG tablet Take 1 tablet (8 mg) by mouth daily  Qty:      Comments: Nausea      oxyCODONE (ROXICODONE) 5 MG tablet Take 1-2 tablets (5-10 mg) by mouth every 3 hours as needed for pain  Qty: 30 tablet, Refills: 0    Associated Diagnoses: Glioblastoma (H)      oxyCODONE (XTAMPZA ER) 13.5 MG 12 hr tablet Take 1 tablet (13.5 mg) by mouth every 12 hours  Refills: 0    Comments: Pain      polyethylene glycol (MIRALAX) 17 g packet Take 17 g by mouth daily  Qty:      Comments: Bowel regimen      prochlorperazine (COMPAZINE) 10 MG tablet Take 1 tablet (10 mg) by mouth as needed  Qty:      Comments: Nausea      senna-docusate (SENOKOT-S/PERICOLACE) 8.6-50 MG tablet Take 1 tablet by mouth 2 times daily  Qty:      Comments: Bowel regimen      sildenafil (REVATIO) 20 MG tablet Take 1 tablet (20 mg) by mouth as needed  Qty:      Comments: ED       !! - Potential duplicate medications found. Please discuss with provider.      STOP taking these medications       clindamycin (CLEOCIN) 300 MG capsule Comments:   Reason for Stopping:                       Discharge Instructions and Follow-Up:     Discharge diet: Regular   Discharge activity: You may advance activity as tolerated. No strenuous exercise or heay lifting greater than 10 lbs for 4 weeks or until seen and cleared in clinic.   Discharge follow-up: Follow-up with Dr. Raul Haq MD in 2 weeks   Wound care: Ok to shower,however no scrubbing of the  wound and no soaking of the wound, meaning no bathtubs or swimming pools. Pat dry only. Leave wound open to air.  Sutures are not absorbable and need to be removed in 2 weeks. If patient still at rehab by this time, the sutures may be removed by the rehab physician if he or she considers that the wound has healed completely.     Please call if you have:  1. increased pain, redness, drainage, swelling at your incision  2. fevers > 101.5 F degrees  3. with any questions or concerns.  You may reach the Neurosurgery clinic at [phone] during regular work hours. ER at [phone].    and ask for the Neurosurgery Resident on call at [phone], during off hours or weekends.         Discharge Disposition:     Discharged to home

## 2020-10-17 NOTE — PLAN OF CARE
Status:  Pt w/ recent R crani and surgical resection of recurrent GBM on 9/23 c/b CSF leak from head incision. S/p LD removal 10/14. Now s/p Left sided ventriculoperitoneal shunt 10/14.  Vitals: VSS ex intermittent tachy   Neuros: AxOx4. Slow speech. Flat affect. Slow to respond. Needs multiple prompts to follow commands at times. Can be impulsive and forgetful. L side 4/5. Denies N/T.   IV: PIV SL between abx.  Resp/trach: LS clear.   Diet: regular diet. Good PO intake.   Bowel status: BS+. Incontinent of stool per report. No BMs this shift.   : incontinent at times. Pt has frequency and urgency. Usually urinates before calling staff.   Skin: head incisions covered w/ primapore, scant drainage marked. LD site covered w/ primapore, CDI. Abdominal incision w/ marked bruising.   Pain: denies pain.   Activity: up SBA. Pt is impulsive. BA and chair alarm at all times.   Social: Wife at bedside and supportive.   Plan: continue abx. Monitor and follow POC.

## 2020-10-17 NOTE — PROGRESS NOTES
Neurosurgery progress note    S: Had csf leak from cranial site shunt dialed down to certas 1    O:  Temp:  [96.3  F (35.7  C)-98  F (36.7  C)] 97.8  F (36.6  C)  Pulse:  [] 103  Resp:  [14-16] 16  BP: ()/(64-87) 99/64  SpO2:  [93 %-100 %] 93 %    Exam:  NEUROLOGIC:  -- Awake; Alert; oriented x 3  -- Follows commands briskly  -- +repetition, calculation, and naming  -- Speech fluent, spontaneous. No aphasia or dysarthria.  -- Mild L nasolabial flattening     Motor:  Normal bulk / tone; no tremor, rigidity, or bradykinesia.  No muscle wasting or fasciculations  No Pronator Drift  LUE and LLE 4/5 baseline per EMR    CTH small area around the shunt catheter, otherwise no sdh, ventricle size stable      ASSESSMENT:  is a 50 year old man with a recent 9/23/2020 scheduled right craniotomy and surgical resection for ecurrent GBM with CSF leak from the incision. LD placed on 10/8 incision revised. LD removed on 10/13. Developed CSF leak on 10/14.  shunt placement on 10/14, certas 3. Had LD site leak, shunt dialed down to certas 2 and site oversewn on 10/15.Had csf leak from cranial incision shunt dialed down to 1.         RECOMMENDATIONS:  - Continue monitoring for csf leak  - Broad spectrm abx for 3 more days csf dosing  - Hypokalemia required replacedment    Mattie Zaragoza MD  Neurosurgery Resident     Please contact neurosurgery resident on call with questions.    Dial * * [phone], enter [phone] when prompted

## 2020-10-17 NOTE — PLAN OF CARE
Status: Pt w/ recent R crani and surgical resection of recurrent GBM on 9/23 c/b CSF leak from head incision. S/p LD removal 10/14. Now s/p Left sided ventriculoperitoneal shunt 10/14.  Vitals: VSS on RA.  Neuros: A&Ox4. Impulsive. Forgetful. Slow to respond to commands, 4-5/5 t/o with L. Side slightly weaker than R. Flat affect.   IV: PIV SL b/w abx.  Diet: Regular  Bowel status: Pt continent of BM x1 this shift.  : Voiding spontaneously w/ urgency & intermittent incontinence.  Skin: Head incision ANDREW, approximated, sutured, CDI. R head dressing changed by MD at bedside this AM. Watch for drainage. L. Abdominal incision ANDREW with liquid bandage, CDI. Old LD w/ small dressing, no drainage noted.  Pain: C/o mild HA but declined PRNs.  Activity: SBA w/ GB. Steady on feet. Bed alarm on.  Plan: Continue to monitor and follow POC. Possible discharge Sunday.

## 2020-10-17 NOTE — ANESTHESIA POSTPROCEDURE EVALUATION
Anesthesia POST Procedure Evaluation    Patient: Gaetano Holley   MRN:     [id number] Gender:   male   Age:    50 year old :      1970        Preoperative Diagnosis: CSF leak [G96.00]   Procedure(s):  LAPAROSCOPY ASSISTED INSERTION, SHUNT, VENTRICULOPERITONEAL, USING OPTICAL TRACKING SYSTEM   Postop Comments: No value filed.     Anesthesia Type: General       Disposition: Admission   Postop Pain Control: Uneventful            Sign Out: Well controlled pain   PONV: No   Neuro/Psych: Uneventful            Sign Out: Acceptable/Baseline neuro status   Airway/Respiratory: Uneventful            Sign Out: Acceptable/Baseline resp. status   CV/Hemodynamics: Uneventful            Sign Out: Acceptable CV status   Other NRE: NONE   DID A NON-ROUTINE EVENT OCCUR? No         Last Anesthesia Record Vitals:  CRNA VITALS  10/15/2020 0017 - 10/15/2020 0117      10/15/2020             Pulse:  97    SpO2:  100 %          Last PACU Vitals:  Vitals Value Taken Time   /104 10/15/20 0200   Temp 36.6  C (97.9  F) 10/15/20 0200   Pulse 82 10/15/20 0200   Resp 16 10/15/20 0200   SpO2 96 % 10/15/20 0200   Temp src     NIBP     Pulse     SpO2     Resp     Temp     Ht Rate     Temp 2           Electronically Signed By: Ayesha Maldonado MD, 2020, 3:38 PM

## 2020-10-17 NOTE — PLAN OF CARE
Status: Pt with known frontal GBM with previous crani or resction 9/23 admitted for CSF leak from head incision. Lumbar drain placed 10/8. Now POD#2  shunt placement   Vitals: VSS on RA  Neuros: A+Ox4. 5/5, left side slightly weaker than right. Slow to respond. Slow speech. Flat affect.  IV: R. PIV SL between infusions  Diet: Regular  Bowel status: BS+, No BM this shift.    : Voiding spontaneously w/ urgency and intermittent incontinence   Skin: Head incision has primpaore. Old LD site with primapore dressing,  Abdominal incision with liquid bandage, some lauri incision bruising, marked with skin marker.   Pain: c/o abdominal pain at incision site, pain relieved with scheduled omeprazole, and repositioning to HOB>30. Pt declined any prn meds for slight head pain.   Activity: A1+GB. Impulsive when he gets the urge to use the bathroom, keep bed alarm on   Plan: Continue to monitor incisions for leaking and follow POC.

## 2020-10-18 ENCOUNTER — APPOINTMENT (OUTPATIENT)
Dept: CT IMAGING | Facility: CLINIC | Age: 50
DRG: 031 | End: 2020-10-18
Attending: STUDENT IN AN ORGANIZED HEALTH CARE EDUCATION/TRAINING PROGRAM
Payer: COMMERCIAL

## 2020-10-18 LAB
ALBUMIN SERPL-MCNC: 2.9 G/DL (ref 3.4–5)
ALP SERPL-CCNC: 55 U/L (ref 40–150)
ALT SERPL W P-5'-P-CCNC: 59 U/L (ref 0–70)
ANION GAP SERPL CALCULATED.3IONS-SCNC: 6 MMOL/L (ref 3–14)
AST SERPL W P-5'-P-CCNC: 18 U/L (ref 0–45)
BILIRUB SERPL-MCNC: 0.3 MG/DL (ref 0.2–1.3)
BUN SERPL-MCNC: 19 MG/DL (ref 7–30)
CALCIUM SERPL-MCNC: 8.6 MG/DL (ref 8.5–10.1)
CHLORIDE SERPL-SCNC: 110 MMOL/L (ref 94–109)
CO2 SERPL-SCNC: 23 MMOL/L (ref 20–32)
CREAT SERPL-MCNC: 0.61 MG/DL (ref 0.66–1.25)
CRP SERPL-MCNC: 18 MG/L (ref 0–8)
ERYTHROCYTE [DISTWIDTH] IN BLOOD BY AUTOMATED COUNT: 13.6 % (ref 10–15)
GFR SERPL CREATININE-BSD FRML MDRD: >90 ML/MIN/{1.73_M2}
GLUCOSE SERPL-MCNC: 153 MG/DL (ref 70–99)
HCT VFR BLD AUTO: 30.4 % (ref 40–53)
HGB BLD-MCNC: 10.3 G/DL (ref 13.3–17.7)
MCH RBC QN AUTO: 31.3 PG (ref 26.5–33)
MCHC RBC AUTO-ENTMCNC: 33.9 G/DL (ref 31.5–36.5)
MCV RBC AUTO: 92 FL (ref 78–100)
PLATELET # BLD AUTO: 147 10E9/L (ref 150–450)
POTASSIUM SERPL-SCNC: 3.7 MMOL/L (ref 3.4–5.3)
PROT SERPL-MCNC: 6.3 G/DL (ref 6.8–8.8)
RBC # BLD AUTO: 3.29 10E12/L (ref 4.4–5.9)
SODIUM SERPL-SCNC: 140 MMOL/L (ref 133–144)
WBC # BLD AUTO: 8.4 10E9/L (ref 4–11)

## 2020-10-18 PROCEDURE — 250N000012 HC RX MED GY IP 250 OP 636 PS 637: Performed by: NURSE PRACTITIONER

## 2020-10-18 PROCEDURE — 250N000013 HC RX MED GY IP 250 OP 250 PS 637: Performed by: STUDENT IN AN ORGANIZED HEALTH CARE EDUCATION/TRAINING PROGRAM

## 2020-10-18 PROCEDURE — 85027 COMPLETE CBC AUTOMATED: CPT | Performed by: STUDENT IN AN ORGANIZED HEALTH CARE EDUCATION/TRAINING PROGRAM

## 2020-10-18 PROCEDURE — 250N000011 HC RX IP 250 OP 636: Performed by: STUDENT IN AN ORGANIZED HEALTH CARE EDUCATION/TRAINING PROGRAM

## 2020-10-18 PROCEDURE — 36415 COLL VENOUS BLD VENIPUNCTURE: CPT | Performed by: STUDENT IN AN ORGANIZED HEALTH CARE EDUCATION/TRAINING PROGRAM

## 2020-10-18 PROCEDURE — 250N000011 HC RX IP 250 OP 636: Performed by: NEUROLOGICAL SURGERY

## 2020-10-18 PROCEDURE — 258N000003 HC RX IP 258 OP 636: Performed by: NEUROLOGICAL SURGERY

## 2020-10-18 PROCEDURE — 99222 1ST HOSP IP/OBS MODERATE 55: CPT | Performed by: STUDENT IN AN ORGANIZED HEALTH CARE EDUCATION/TRAINING PROGRAM

## 2020-10-18 PROCEDURE — 86140 C-REACTIVE PROTEIN: CPT | Performed by: STUDENT IN AN ORGANIZED HEALTH CARE EDUCATION/TRAINING PROGRAM

## 2020-10-18 PROCEDURE — 70450 CT HEAD/BRAIN W/O DYE: CPT | Mod: 26 | Performed by: RADIOLOGY

## 2020-10-18 PROCEDURE — 120N000002 HC R&B MED SURG/OB UMMC

## 2020-10-18 PROCEDURE — 70450 CT HEAD/BRAIN W/O DYE: CPT

## 2020-10-18 PROCEDURE — 80053 COMPREHEN METABOLIC PANEL: CPT | Performed by: STUDENT IN AN ORGANIZED HEALTH CARE EDUCATION/TRAINING PROGRAM

## 2020-10-18 PROCEDURE — 999N000127 HC STATISTIC PERIPHERAL IV START W US GUIDANCE

## 2020-10-18 RX ADMIN — METHYLPHENIDATE HYDROCHLORIDE 5 MG: 5 TABLET ORAL at 07:43

## 2020-10-18 RX ADMIN — DEXAMETHASONE 2 MG: 2 TABLET ORAL at 07:41

## 2020-10-18 RX ADMIN — OXYCODONE HYDROCHLORIDE 5 MG: 5 TABLET ORAL at 12:08

## 2020-10-18 RX ADMIN — ACETAMINOPHEN 650 MG: 325 TABLET, FILM COATED ORAL at 12:07

## 2020-10-18 RX ADMIN — METHYLPHENIDATE HYDROCHLORIDE 5 MG: 5 TABLET ORAL at 12:57

## 2020-10-18 RX ADMIN — LISINOPRIL 10 MG: 10 TABLET ORAL at 07:41

## 2020-10-18 RX ADMIN — CEFEPIME 2 G: 2 INJECTION, POWDER, FOR SOLUTION INTRAVENOUS at 23:49

## 2020-10-18 RX ADMIN — CEFEPIME 2 G: 2 INJECTION, POWDER, FOR SOLUTION INTRAVENOUS at 07:36

## 2020-10-18 RX ADMIN — ESCITALOPRAM 10 MG: 10 TABLET, FILM COATED ORAL at 07:39

## 2020-10-18 RX ADMIN — OMEPRAZOLE 40 MG: 20 CAPSULE, DELAYED RELEASE ORAL at 16:21

## 2020-10-18 RX ADMIN — VANCOMYCIN HYDROCHLORIDE 2250 MG: 10 INJECTION, POWDER, LYOPHILIZED, FOR SOLUTION INTRAVENOUS at 00:22

## 2020-10-18 RX ADMIN — CEFEPIME 2 G: 2 INJECTION, POWDER, FOR SOLUTION INTRAVENOUS at 16:21

## 2020-10-18 RX ADMIN — DEXAMETHASONE 2 MG: 2 TABLET ORAL at 19:17

## 2020-10-18 RX ADMIN — VANCOMYCIN HYDROCHLORIDE 2250 MG: 10 INJECTION, POWDER, LYOPHILIZED, FOR SOLUTION INTRAVENOUS at 10:58

## 2020-10-18 RX ADMIN — OMEPRAZOLE 40 MG: 20 CAPSULE, DELAYED RELEASE ORAL at 07:38

## 2020-10-18 RX ADMIN — LEVETIRACETAM 1500 MG: 750 TABLET, FILM COATED ORAL at 07:40

## 2020-10-18 RX ADMIN — LEVETIRACETAM 1500 MG: 750 TABLET, FILM COATED ORAL at 19:17

## 2020-10-18 ASSESSMENT — ACTIVITIES OF DAILY LIVING (ADL)
ADLS_ACUITY_SCORE: 20

## 2020-10-18 NOTE — PROGRESS NOTES
Neurosurgery progress note    S: No acute events; questionable drainage, will continue to follow throughout today    O:  Temp:  [95.4  F (35.2  C)-96.5  F (35.8  C)] 95.8  F (35.4  C)  Pulse:  [] 88  Resp:  [16-18] 16  BP: (101-120)/(68-88) 101/81  SpO2:  [96 %-98 %] 97 %    Exam:  NEUROLOGIC:  -- Awake; Alert; oriented x 3  -- Follows commands briskly  -- +repetition, calculation, and naming  -- Speech fluent, spontaneous. No aphasia or dysarthria.  -- Mild L nasolabial flattening     Motor:  Normal bulk / tone; no tremor, rigidity, or bradykinesia.  No muscle wasting or fasciculations  No Pronator Drift  LUE and LLE 4/5 baseline    CTH small area around the shunt catheter shows air, otherwise no sdh, ventricle size stable      ASSESSMENT:  is a 50 year old man with a recent 9/23/2020 scheduled right craniotomy and surgical resection for ecurrent GBM with CSF leak from the incision. LD placed on 10/8 incision revised. LD removed on 10/13. Developed CSF leak on 10/14.  shunt placement on 10/14, certas 3. Had LD site leak, shunt dialed down to certas 2 and site oversewn on 10/15.Had csf leak from cranial incision shunt dialed down to 1.         RECOMMENDATIONS:  - Continue monitoring for csf leak  - Broad spectrm abx to end today    Isela Berger MD  Neurosurgery Resident     Please contact neurosurgery resident on call with questions.    Dial * * [phone], enter [phone] when prompted

## 2020-10-18 NOTE — CONSULTS
GENERAL INFECTIOUS DISEASES CONSULTATION     Patient:  Gaetano Holley   Date of birth 1970, Medical record number [id number]  Date of Visit:  10/18/2020  Date of Admission: 10/7/2020  Consult Requester:Raul Haq, *          Assessment and Recommendations:   ASSESSMENT:  1. S/P right craniotomy and surgical resection of recurrent GBM (9/23/20)  2. CSF leak from incision (10/6/20)  3. S/p placement of left sided  shunt (10/14/20)  4. CSF culture with growth of bacillus species in anaerobic broth (8/12/20)- likely contaminant, see below    DISCUSSION:   Luis Holley is a 50 year old male with history of GBM initially diagnosed 1/2020 after presenting with seizure, treated with chemoradiotherapy and surgical resection, who underwent scheduled right craniotomy with surgical resection of recurrent GBM (9/23/20) and returned from ARU after he developed CSF leak from incision on 10/6/2020. Found to have hydrocephalus with lumbar drain placed 10/8, then  shunt placement on 10/14/20. He has been on broad spectrum antibiotics with Vancomycin and Cefepime since 10/8, with addition of metronidazole from 10/8-10/15. Noted to have ongoing CSF oozing from craniotomy incision and old lumbar drain site despite adjustment of shunt valve. ID has been consulted due to Bacillus species growing in broth on anaerobic culture from CSF on 10/12. There was no change to patient status on the day of culture collection (10/12), inflammatory markers were down-trending and patient was afebrile. CSF was also collected on 10/8/20 and cultures from that date were negative. There was an increase in WBC between 10/8-10/12 from 7 to 42, and protein from 143-153. However, the percent of neutrophils on differential decreased and glucose remained stable. The patient's clinical status has remained stable to improving throughout hospitalization.     The organism in question (Bacillus species) is less likely to cause infection.  However, due to multiple factors including potential CNS involvement, collection from existing lumbar drain, and placement of  shunt after culture collection, determining significance is complicated. Result was further discussed with the microbiology lab. Lab staff report that a broth that was used in anaerobic cultures during the time of collection was contaminated with Bacillus species. They were able to isolate the same strain even when the broth was placed on sterile plates that had not been inoculated with samples. Given this piece of information along with patient's stable clinical picture, the Bacillus species on CSF from 10/12 is likely contaminant. Okay to stop antibiotics and monitor closely for signs of developing infection.      RECOMMENDATION:  1. Okay to stop antibiotics  2. Monitor closely  3. ID will sign off at this time, please call General ID should further questions arise.    Thank you for this consult. ID will sign off at this time.     This patient was discussed with Dr. Cohen, ID attending on 10/18/2020.    Delilah Lion PA-C  Infectious Disease  Pager # [phone]  ________________________________________________________________    Consult Question: broth only growth of bacillus species in shunted patient.  Admission Diagnosis: CSF leak [G96.00]         History of Present Illness:     Luis Holley is a 50 year old male with history of GBM initially diagnosed 1/2020 after presenting with seizure, treated with chemoradiotherapy and surgical resection, who underwent scheduled right craniotomy with surgical resection of recurrent GBM (9/23/20) and returned from ARU after he developed CSF leak from incision on 10/6/2020. Found to have hydrocephalus with lumbar drain placed 10/8, then  shunt placement on 10/14/20. He has been on broad spectrum antibiotics with Vancomycin and Cefepime since 10/8, with addition of metronidazole from 10/8-10/15. Noted to have ongoing CSF oozing from craniotomy  incision and old lumbar drain site despite adjustment of shunt valve. ID has been consulted due to Bacillus species on culture from CSF on 10/12.    History is provided by Mr. Holley and his wife Shalini. He had symptoms of nausea, headaches with the hydrocephalus. These have resolved since placement of the  shunt. Patient reports itching and pain at site of abdominal incision that has steadily increased. This has not been draining. Throughout the hospitalization there has been intermittent CSF leak from his craniotomy incision, after lumbar drain was removed there has also been drainage from that site. Possible drainage early this morning but no recurrence since day team arrived and there is no leakage on dressings right now. He has an intermittent tremor that has been present since his diagnosis and has been consistent with baseline. He has not had any fevers, chills, rigors, sweats, diarrhea, myalgias, arthralgias, back pain, change in level of altertness (there has been some fluctuation but improving overall).    Social history: lives in single family home with wife and children, they have cats and a dog. No recent international travel, no travel to [facility] or [place]. Enjoys being outdoors but has not spent much time outdoors this year. Works as a  for [employer].       Antimicrobials  Current:  Cefepime (start 10/8)  Vancomycin (start 10/8)    Previous:  Metronidazole (10/8-10/15)  Gentamicin (10/14 lauri-op)         Review of Systems:   CONSTITUTIONAL:  Appetite has been poor but improving. No fevers or chills, rigors, sweats.  EYES: negative for icterus, acute vision change  ENT:  negative for sore throat  RESPIRATORY:  negative for cough with sputum and dyspnea  CARDIOVASCULAR:  negative for chest pain, dyspnea  GASTROINTESTINAL:  +nausea (resolved). negative for vomiting, diarrhea and constipation  GENITOURINARY:  negative for dysuria  MUSCULOSKELETAL: negative for myalgias,  arthralgias  INTEGUMENT:  +pruritis at abd incision site with burning sensation. negative for rash.  NEURO:  +headache (resolved), intermittent tremor.         Past Medical History:     Past Medical History:   Diagnosis Date     Abdominal pain 2008    unspecified      Acute cholecystitis 07/01/2008     Depressive disorder 1998     Erectile dysfunction 2008     Hypertension      Impaired fasting glucose 12/12/2008     Iron deficiency anemia      Seizures (H)             Past Surgical History:     Past Surgical History:   Procedure Laterality Date     CHOLECYSTECTOMY  07/01/2008     colonoscopy biopsy  12/01/2008     MRI CRANIOTOMY WITH OPTICAL TRACKING SYSTEM Right 9/23/2020    Procedure: INTRAOPERATIVE Magnetic Resonance Imaging CRANIOTOMY, USING OPTICAL TRACKING SYSTEM, STEALTH;  Surgeon: Raul Haq MD;  Location: [facility] OR     OPTICAL TRACKING SYSTEM IMPLANT SHUNT VENTRICULOPERITONEAL Left 10/14/2020    Procedure: LAPAROSCOPY ASSISTED INSERTION, SHUNT, VENTRICULOPERITONEAL, USING OPTICAL TRACKING SYSTEM;  Surgeon: Raul Haq MD;  Location: [facility] OR     right frontal craniotomy  01/07/2020            Family History:   Reviewed and non-contributory.   History reviewed. No pertinent family history.         Social History:     Social History     Tobacco Use     Smoking status: Never Smoker     Smokeless tobacco: Never Used   Substance Use Topics     Alcohol use: Yes     Comment: occasionally     History   Sexual Activity     Sexual activity: Not on file            Current Medications:       ceFEPIme (MAXIPIME) IV  2 g Intravenous Q8H     dexamethasone  2 mg Oral Q12H ANTHONY     escitalopram  10 mg Oral Daily     fluticasone  50 spray Both Nostrils Daily     levETIRAcetam  1,500 mg Oral BID     lisinopril  10 mg Oral Daily     methylphenidate  5 mg Oral BID     omeprazole  40 mg Oral BID AC     polyethylene glycol  17 g Oral Daily     senna-docusate  1 tablet Oral BID     vancomycin (VANCOCIN) IV  2,250  mg Intravenous Q12H            Allergies:     Allergies   Allergen Reactions     Food Other (See Comments)     Pineapple and pineapple juice causes severe vomiting     Morphine      Sedated but the pain was not relieved       Penicillins Other (See Comments)     Childhood reaction, possible anaphylaxis     Pineapple Nausea and Vomiting            Physical Exam:   Vitals were reviewed  Patient Vitals for the past 24 hrs:   BP Temp Temp src Pulse Resp SpO2   10/18/20 1137 112/89 96.6  F (35.9  C) Oral 104 16 96 %   10/18/20 0730 101/81 95.8  F (35.4  C) Axillary 88 16 97 %   10/18/20 0336 108/74 96.5  F (35.8  C) Oral 88 18 --   10/17/20 2254 115/84 96.2  F (35.7  C) Oral 97 16 96 %   10/17/20 1551 120/68 95.4  F (35.2  C) Oral 94 16 96 %       Physical Examination:  GENERAL:  Awake, alert, oriented x3, well-developed, well-nourished, in bed in no acute distress.    HEENT:  Right craniotomy incision is c/d/i without swelling or erythema.   EYES:  Eyes have anicteric sclerae without conjunctival injection or stigmata of endocarditis.    ENT:  Oropharynx is moist without exudates or ulcers.   NECK:  Supple.   LUNGS:  Clear to auscultation bilateral.   CARDIOVASCULAR:  Regular rate and rhythm with no murmurs, gallops or rubs.  ABDOMEN:  2-3cm incision on R abdomen, glued closed, surrounding erythema that does not extend beyond glue, no drainage. Mild bruising over RLQ in various stages of healing. Normal bowel sounds, soft, nontender.  SKIN:  Old lumbar drain site with dressing in place, no drainage. No acute rashes.   Bilat UE PIV lines are in place without any surrounding erythema or exudate.   NEUROLOGIC:  Active x4 extremities, alert and oriented         Laboratory Data:     Inflammatory Markers    Recent Labs   Lab Test 10/18/20  0637 10/17/20  0656 10/16/20  0709 10/15/20  0824 10/14/20  0629 10/13/20  0606 10/12/20  0555 10/11/20  0723 10/07/20  2104 10/07/20  2104   SED  --   --   --   --   --   --   --   --   --   10   CRP 18.0* 18.0* 19.0* 20.0* 14.0* 9.3* 15.0* 19.0*   < > <2.9    < > = values in this interval not displayed.       Hematology Studies    Recent Labs   Lab Test 10/18/20  0637 10/17/20  0656 10/16/20  0709 10/15/20  0824 10/14/20  0629 10/13/20  0606 10/07/20  2104 10/07/20  2104   WBC 8.4 8.3 9.2 8.0 7.0 6.5   < > 11.6*   ANEU  --   --   --   --   --   --   --  8.6*   AEOS  --   --   --   --   --   --   --  0.1   HGB 10.3* 9.8* 10.5* 10.7* 10.3* 10.7*   < > 13.2*   MCV 92 92 94 92 95 91   < > 95   * 132* 143* 142* 128* 142*   < > 266    < > = values in this interval not displayed.       Metabolic Studies     Recent Labs   Lab Test 10/18/20  0637 10/17/20  0656 10/16/20  0709 10/15/20  0824 10/14/20  1736 10/14/20  0629    142 142 141  --  141   POTASSIUM 3.7 3.6 3.8 4.0 4.3 3.3*   CHLORIDE 110* 111* 113* 110*  --  114*   CO2 23 24 25 25  --  20   BUN 19 19 14 8  --  17   CR 0.61* 0.63* 0.61* 0.61*  --  0.66   GFRESTIMATED >90 >90 >90 >90  --  >90       Hepatic Studies    Recent Labs   Lab Test 10/18/20  0637 10/17/20  0656 10/16/20  0709 10/15/20  0824 10/14/20  0629 10/13/20  0606   BILITOTAL 0.3 0.4 0.4 0.5 0.3 0.5   ALKPHOS 55 50 46 47 44 41   ALBUMIN 2.9* 2.8* 2.8* 2.9* 2.7* 2.6*   AST 18 11 15 25 18 14   ALT 59 60 65 79* 61 59       Microbiology:  Culture Micro   Date Value Ref Range Status   10/12/2020 (A)  Preliminary    On day 2, isolated in broth only:  Bacillus species, not anthracis nor cereus group  Identification obtained by MALDI-TOF mass spectrometry research use only database. Test   characteristics determined and verified by the [facility]   ([facility]) [place], [place].  not isolated or reported on routine culture     10/12/2020   Preliminary    These bacteria can be environmental contaminants, but on occasion, may be pathogens.   Clinical correlation must be applied to interpreting this microbiology result.     10/12/2020   Preliminary    Critical  Value/Significant Value called to and read back by  Marclela Arriaga RN at 1149 10/15/20 SRQ     10/12/2020 No growth  Final   10/08/2020 Culture negative monitoring continues  Preliminary   10/08/2020 No growth  Final   10/08/2020 Culture negative after 1 week  Preliminary   10/07/2020 No growth  Final   10/07/2020 No growth  Final       Urine Studies  No lab results found.    Vancomycin Levels    Recent Labs   Lab Test 10/16/20  1020 10/13/20  1700 10/11/20  1608   VANCOMYCIN 15.2 16.7 14.3       Hepatitis B Testing No lab results found.  Hepatitis C Testing   No results found for: HCVAB, HQTG, HCGENO, HCPCR, HQTRNA, HEPRNA  Respiratory Virus Testing    No results found for: RS, FLUAG          Imaging:   CT head w/o contrast (10/18/2020)  Impression:   1. Stable postoperative changes of the head with large right frontal  resection cavity and right frontal craniotomy. Configuration of the  ventricular system with the left frontal approach ventriculostomy  catheter is unchanged.  3. No new mass or hemorrhage.    MRI Brain w/o & w/ contrast (9/24/20)  Impression:  This patient with a history of glioblastoma status post resection and  chemoradiotherapy:  1. Postoperative changes of right frontal craniotomy with underlying  mass resection. Hemosiderin staining along the resection cavity with  intraventricular extension into the lateral ventricles. No  hydrocephalus. Linear T1 hyperintense signal surrounding the resection  cavity likely represents mix of postsurgical linear enhancement and  blood products.  2. Nodular foci of enhancement posterior to the resection cavity are  not significantly changed compared to 8/4/2020. Attention on follow-up  imaging is recommended.  3. Dural thickening and enhancement adjacent to the resection cavity,  likely postoperative change.  4. Increased T2 signal hyperintensity in the right frontal parietal  region extending across the genuine of the corpus callosum partially  due to  postoperative change. Attention is recommended on follow-up  imaging.

## 2020-10-18 NOTE — PLAN OF CARE
Status: Pt with known frontal GBM with previous crani or resction 9/23 admitted for CSF leak from head incision. Lumbar drain placed 10/8. Now POD#2  shunt placement   Vitals: VSS on RA  Neuros: A+Ox4. left side 4/5,  right. Slow to respond. Slow speech. Flat affect.  IV: R. PIV SL between infusions  Diet: Regular  Bowel status: BS+, large BM this shift.    : Voiding spontaneously w/ urgency and intermittent incontinence   Skin: Head incision has primpaore. Old LD site with primapore dressing,  Abdominal incision with liquid bandage, some lauri incision bruising, and erythema marked with skin marker.   Pain: c/o abdominal pain at incision site. Tylenol and oxycodone for pain.   Activity: A1+GB. Impulsive when he gets the urge to use the bathroom, keep bed alarm on   Plan: Continue to monitor incisions for CSF leaking and follow POC.

## 2020-10-18 NOTE — PLAN OF CARE
Shift: [id number]    Status: s/p  shunt on 10/14  Neuro: Alert and oriented x4, impulsive at times, lethargic intermittently   Cardiac/VS: VSS  Respiratory: RA  GI: Denies N/V  Diet/Appetite: Regular   : Voids spontaneously, intermittent incontinence   Activity: Up w/ SBA  Pain: Denies pain  Skin: No new deficits   LDA(s): R PIV SL w/ intermittent abx      Plan: Continue plan of care

## 2020-10-18 NOTE — PLAN OF CARE
Status:  Pt w/ recent R crani and surgical resection of recurrent GBM on 9/23 c/b CSF leak from head incision. S/p LD removal 10/14. Now s/p Left sided ventriculoperitoneal shunt 10/14.  Vitals: VSS ex intermittent tachy   Neuros: AxOx4. Slow speech. Flat affect. Slow to respond. Needs multiple prompts to follow commands at times. Can be impulsive and forgetful. L side 4/5. Slight intermittent ataxia to UE. New numbness to back of L thigh, team notified. Denies N/T otherwise.    IV: Old PIV dc'ed d/t burning/pain. New LUE PIV SL between abx.  Resp/trach: LS clear.   Diet: regular diet. Good PO intake.   Bowel status: BS+. Continent of BM x1 this shift. Intermittently incontinent of stool per report. Pt reporting some abdominal fullness. Passing flatus.   : incontinent at times. Pt has frequency and urgency. Usually urinates before calling staff.   Skin: head incision dressing changed by team this AM, no drainage noted. LD site covered w/ primapore, CDI. Abdominal incision w/ marked bruising.   Pain: denies pain.   Activity: up SBA. Pt is impulsive. BA and chair alarm at all times.   Social: Wife at bedside and supportive.   Plan: continue abx. Monitor and follow POC.

## 2020-10-19 ENCOUNTER — APPOINTMENT (OUTPATIENT)
Dept: CT IMAGING | Facility: CLINIC | Age: 50
DRG: 031 | End: 2020-10-19
Attending: STUDENT IN AN ORGANIZED HEALTH CARE EDUCATION/TRAINING PROGRAM
Payer: COMMERCIAL

## 2020-10-19 ENCOUNTER — TELEPHONE (OUTPATIENT)
Dept: PHARMACY | Facility: OTHER | Age: 50
End: 2020-10-19

## 2020-10-19 ENCOUNTER — PRE VISIT (OUTPATIENT)
Dept: SURGERY | Facility: CLINIC | Age: 50
End: 2020-10-19

## 2020-10-19 VITALS
DIASTOLIC BLOOD PRESSURE: 80 MMHG | HEIGHT: 72 IN | SYSTOLIC BLOOD PRESSURE: 101 MMHG | HEART RATE: 79 BPM | OXYGEN SATURATION: 98 % | TEMPERATURE: 96.4 F | WEIGHT: 240 LBS | RESPIRATION RATE: 14 BRPM | BODY MASS INDEX: 32.51 KG/M2

## 2020-10-19 LAB
ALBUMIN SERPL-MCNC: 2.8 G/DL (ref 3.4–5)
ALP SERPL-CCNC: 56 U/L (ref 40–150)
ALT SERPL W P-5'-P-CCNC: 73 U/L (ref 0–70)
ANION GAP SERPL CALCULATED.3IONS-SCNC: 8 MMOL/L (ref 3–14)
AST SERPL W P-5'-P-CCNC: 23 U/L (ref 0–45)
BILIRUB SERPL-MCNC: 0.4 MG/DL (ref 0.2–1.3)
BUN SERPL-MCNC: 20 MG/DL (ref 7–30)
CALCIUM SERPL-MCNC: 8.5 MG/DL (ref 8.5–10.1)
CHLORIDE SERPL-SCNC: 111 MMOL/L (ref 94–109)
CO2 SERPL-SCNC: 23 MMOL/L (ref 20–32)
CREAT SERPL-MCNC: 0.63 MG/DL (ref 0.66–1.25)
CRP SERPL-MCNC: 12 MG/L (ref 0–8)
ERYTHROCYTE [DISTWIDTH] IN BLOOD BY AUTOMATED COUNT: 13.6 % (ref 10–15)
GFR SERPL CREATININE-BSD FRML MDRD: >90 ML/MIN/{1.73_M2}
GLUCOSE SERPL-MCNC: 152 MG/DL (ref 70–99)
HCT VFR BLD AUTO: 29.9 % (ref 40–53)
HGB BLD-MCNC: 9.8 G/DL (ref 13.3–17.7)
MCH RBC QN AUTO: 30.7 PG (ref 26.5–33)
MCHC RBC AUTO-ENTMCNC: 32.8 G/DL (ref 31.5–36.5)
MCV RBC AUTO: 94 FL (ref 78–100)
PLATELET # BLD AUTO: 139 10E9/L (ref 150–450)
POTASSIUM SERPL-SCNC: 3.8 MMOL/L (ref 3.4–5.3)
PROT SERPL-MCNC: 6.1 G/DL (ref 6.8–8.8)
RBC # BLD AUTO: 3.19 10E12/L (ref 4.4–5.9)
SODIUM SERPL-SCNC: 142 MMOL/L (ref 133–144)
WBC # BLD AUTO: 7.8 10E9/L (ref 4–11)

## 2020-10-19 PROCEDURE — 258N000003 HC RX IP 258 OP 636: Performed by: STUDENT IN AN ORGANIZED HEALTH CARE EDUCATION/TRAINING PROGRAM

## 2020-10-19 PROCEDURE — 250N000012 HC RX MED GY IP 250 OP 636 PS 637: Performed by: NURSE PRACTITIONER

## 2020-10-19 PROCEDURE — 86140 C-REACTIVE PROTEIN: CPT | Performed by: STUDENT IN AN ORGANIZED HEALTH CARE EDUCATION/TRAINING PROGRAM

## 2020-10-19 PROCEDURE — 250N000011 HC RX IP 250 OP 636: Performed by: STUDENT IN AN ORGANIZED HEALTH CARE EDUCATION/TRAINING PROGRAM

## 2020-10-19 PROCEDURE — 80053 COMPREHEN METABOLIC PANEL: CPT | Performed by: STUDENT IN AN ORGANIZED HEALTH CARE EDUCATION/TRAINING PROGRAM

## 2020-10-19 PROCEDURE — 250N000013 HC RX MED GY IP 250 OP 250 PS 637: Performed by: STUDENT IN AN ORGANIZED HEALTH CARE EDUCATION/TRAINING PROGRAM

## 2020-10-19 PROCEDURE — 85027 COMPLETE CBC AUTOMATED: CPT | Performed by: STUDENT IN AN ORGANIZED HEALTH CARE EDUCATION/TRAINING PROGRAM

## 2020-10-19 PROCEDURE — 70450 CT HEAD/BRAIN W/O DYE: CPT | Mod: 26 | Performed by: RADIOLOGY

## 2020-10-19 PROCEDURE — 70450 CT HEAD/BRAIN W/O DYE: CPT

## 2020-10-19 PROCEDURE — 36415 COLL VENOUS BLD VENIPUNCTURE: CPT | Performed by: STUDENT IN AN ORGANIZED HEALTH CARE EDUCATION/TRAINING PROGRAM

## 2020-10-19 RX ORDER — OXYCODONE HYDROCHLORIDE 5 MG/1
5-10 TABLET ORAL
Qty: 45 TABLET | Refills: 0 | Status: SHIPPED | OUTPATIENT
Start: 2020-10-19

## 2020-10-19 RX ADMIN — METHYLPHENIDATE HYDROCHLORIDE 5 MG: 5 TABLET ORAL at 08:26

## 2020-10-19 RX ADMIN — OMEPRAZOLE 40 MG: 20 CAPSULE, DELAYED RELEASE ORAL at 08:26

## 2020-10-19 RX ADMIN — VANCOMYCIN HYDROCHLORIDE 2250 MG: 1 INJECTION, POWDER, LYOPHILIZED, FOR SOLUTION INTRAVENOUS at 00:28

## 2020-10-19 RX ADMIN — LEVETIRACETAM 1500 MG: 750 TABLET, FILM COATED ORAL at 08:26

## 2020-10-19 RX ADMIN — ESCITALOPRAM 10 MG: 10 TABLET, FILM COATED ORAL at 08:27

## 2020-10-19 RX ADMIN — LISINOPRIL 10 MG: 10 TABLET ORAL at 08:26

## 2020-10-19 RX ADMIN — DEXAMETHASONE 2 MG: 2 TABLET ORAL at 08:27

## 2020-10-19 ASSESSMENT — ACTIVITIES OF DAILY LIVING (ADL)
ADLS_ACUITY_SCORE: 20

## 2020-10-19 NOTE — TELEPHONE ENCOUNTER
MTM referral from: Transitions of Care (recent hospital discharge or ED visit)    MTM referral outreach attempt #1 on October 19, 2020 at 5:23 PM      Outcome: Patient is not interested at this time because they are a [facility] patient, will route to MTM Pharmacist/Provider as an FYI. Thank you for the referral.     Mae Kelly, MTM Coordinator

## 2020-10-19 NOTE — PROGRESS NOTES
Care Management Discharge Note    Discharge Planning:  Expected Discharge Date: 10/19/20   Concerns to be Addressed:  Communicate with home care.       Anticipated Discharge Disposition:  Discharge home  Anticipated Discharge Services:  Skilled home care RN, PT & OT.     Additional Information:  Notified pt's wife I had contacted [facility].   Discharge Orders, Face to Face, H&P; PT note from 10-16 and OT assessment from 10-13 faxed to [facility].   Called Joanne at [facility] and informed her pt discharged today and info was faxed. Provided her with my contact number.         Trista Bach RN Care Coordinator  [address], [facility]  Phone:  [phone].  Fax:  [phone]

## 2020-10-19 NOTE — PLAN OF CARE
Physical Therapy Discharge Summary    Reason for therapy discharge:    Discharged to home with outpatient therapy.    Progress towards therapy goal(s). See goals on Care Plan in [facility] electronic health record for goal details.  Goals met for inpatient stay    Therapy recommendation(s):    Continued therapy is recommended.  Rationale/Recommendations:  patient will benefit from continued skilled PT intervention as a OP to address gait difficulties & balance deficits.

## 2020-10-19 NOTE — PROGRESS NOTES
PIV removed. Discharge paperwork completed with patient/family. All questions answered. All belongings with wife. Pt left via wheelchair and wife to drive patient home.

## 2020-10-19 NOTE — PROGRESS NOTES
Neurosurgery progress note    S: No acute events    O:  Temp:  [95.8  F (35.4  C)-96.8  F (36  C)] 96.8  F (36  C)  Pulse:  [] 99  Resp:  [16-18] 16  BP: ()/(64-89) 99/64  SpO2:  [96 %-97 %] 96 %    Exam:  NEUROLOGIC:  -- Awake; Alert; oriented x 3  -- Follows commands briskly  -- +repetition, calculation, and naming  -- Speech fluent, spontaneous. No aphasia or dysarthria.  -- Mild L nasolabial flattening     Motor:  Normal bulk / tone; no tremor, rigidity, or bradykinesia.  No muscle wasting or fasciculations  No Pronator Drift  LUE and LLE 4/5 baseline    Cranial and LD drain site dressings both c/d/i      ASSESSMENT:  is a 50 year old man with a recent 9/23/2020 scheduled right craniotomy and surgical resection for ecurrent GBM with CSF leak from the incision. LD placed on 10/8 incision revised. LD removed on 10/13. Developed CSF leak on 10/14.  shunt placement on 10/14, certas 3. Had LD site leak, shunt dialed down to certas 2 and site oversewn on 10/15.Had csf leak from cranial incision shunt dialed down to 1.         RECOMMENDATIONS:  - Continue monitoring for csf leak  - ABX completed    Mattie Zaragoza MD  Neurosurgery Resident     Please contact neurosurgery resident on call with questions.    Dial * * [phone], enter [phone] when prompted

## 2020-10-19 NOTE — PROGRESS NOTES
[id number]:  Neuro: A&Ox4. Slow to respond. L sided 4/5. Flat affect. Can be impulsive, bed alarms on.   Activity: Up with Ax1.   Vitals: Afebrile. VSS on RA.   LDAS: L PIV at TKO between abx.   Cardiac: No acute changes.   Respiratory: Lung sounds clear. Stable on RA.    GI/: Incontinent of urine. No BM this shift.   Skin: Head incision, primapore, CDI. LD site primapore, CDI. Head incision, ANDREW.   Pain: Denied.   Diet: Regular.   Plan: Continue to monitor and follow POC.

## 2020-10-20 ENCOUNTER — PATIENT OUTREACH (OUTPATIENT)
Dept: CARE COORDINATION | Facility: CLINIC | Age: 50
End: 2020-10-20

## 2020-10-22 LAB
BACTERIA SPEC CULT: NORMAL
Lab: NORMAL
SPECIMEN SOURCE: NORMAL

## 2020-10-26 LAB
BACTERIA SPEC CULT: ABNORMAL
Lab: ABNORMAL
SPECIMEN SOURCE: ABNORMAL

## 2020-11-05 LAB
FUNGUS SPEC CULT: NORMAL
SPECIMEN SOURCE: NORMAL

## 2021-01-04 ENCOUNTER — HEALTH MAINTENANCE LETTER (OUTPATIENT)
Age: 51
End: 2021-01-04

## 2021-10-11 ENCOUNTER — HEALTH MAINTENANCE LETTER (OUTPATIENT)
Age: 51
End: 2021-10-11

## 2022-01-27 NOTE — PROGRESS NOTES
[facility] ambulatory encounter  GYN OFFICE VISIT    SUBJECTIVE:  Kelley Yang is a 63 year old       seen today for   Chief Complaint   Patient presents with   • Follow-up     3 month follow up Vulvodynia and is currently using Premarin with no improvement. she states that it feels like she is ruff     Patient presents today for follow-up on her vulvodynia.  She has started using the Premarin but has not noticed any improvement in her symptoms.  She feels like it is raw and even dabbing with toilet paper causes discomfort.  Patient had weaned down on her nortriptyline thinking that was contributing to her dyspepsia.  Patient noticed no improvement in her dyspepsia.  She did discontinue her omeprazole because she started to have ringing in her years and found out this was a potential side effect.  She plans to start going up on her nortriptyline because now she appreciates that it was helping with her symptoms.  Patient admits to having other health concerns including palpitations.    I have reviewed the patient's vital signs, medications, and allergies, past medical, surgical, social and family history, updating these as appropriate. See Histories section of the EMR for a display of this information.     ALLERGIES:   Allergen Reactions   • Demerol DIZZINESS     Doesn't want-couldn't see or walk   • Sulfa Antibiotics PRURITUS   • Amantadine Other (See Comments)     dizziness   • Nitrofurantoin Other (See Comments)       Current Outpatient Medications   Medication Sig Dispense Refill   • estrogens, conjugated (PREMARIN) vaginal cream      • nortriptyline (PAMELOR) 10 MG capsule Take 4 capsules by mouth nightly. (Patient taking differently: Take 30 mg by mouth nightly. ) 360 capsule 1   • VITAMIN D, CHOLECALCIFEROL, PO Take 5,000 Units by mouth.        No current facility-administered medications for this visit.       Complete ROS otherwise negative except noted above.     Physical Exam:    Vitals:   Visit  Rapid Response Summary   - Called for LA 3.1 slightly down from 3.3 earlier.  Neurosurgery already at bedside and managing, plans are for fluid bolus now and recheck this evening.      Rapid Response Team Note    Assessment   In assessment a rapid response was called on Gaetano Holley due to SIRS/Sepsis trigger.     This presentation is likely due to malignancy and recent surgery and worsened by the comorbidities listed above. The patient is NOT critically ill at this time.    Sepsis Evaluation   NO EVIDENCE OF SEPSIS at this time.  Vital sign, physical exam, and lab findings are likely due to malignancy and surgery.    Plan   - Neurosurg managing  - Fluids and LA recheck per primary team    Disposition: The patient will remain on the current unit. We will continue to monitor this patient closely..    The Neurosurg primary team was able to be reached and they are in agreement with the above plan.    Reassessment   Reassessment and plan follow-up will be performed by the primary team.     Time Spent on this Encounter   Total Critical Care time spent by me, excluding procedures, was 0 minutes.    PAUL Mccollum CNP  [facility] Job Code Contact #[id number]    Hospital Course   Admission Diagnosis: Glioblastoma (H) [C71.9]     Brief Summary of events leading to rapid response:   A rapid response was called for Gaetano Holley due to lactic acidosis identified by abnormal vitals triggering the SIRS/Sepsis screening alert.    The patients is not known to have an infection.    Significant Comorbidities:   Glioblastoma    Medications   Scheduled     acetaminophen  975 mg Oral Q8H     dexamethasone  3 mg Oral or Feeding Tube Q8H     escitalopram  10 mg Oral Daily     fluticasone  50 spray Both Nostrils Daily     lactated ringers  1,000 mL Intravenous Once     levETIRAcetam  1,500 mg Oral BID     levofloxacin  500 mg Oral Daily     lisinopril  10 mg Oral Daily     [START ON 9/26/2020] loratadine  10  Vitals  /60 (BP Location: RUE - Right upper extremity, Patient Position: Sitting, Cuff Size: Regular)   Pulse (!) 105   Ht 5' 5\" (1.651 m)   Wt 60.7 kg (133 lb 12.8 oz)   LMP 05/01/2020   SpO2 96%   BMI 22.27 kg/m²     General: this is a 63 year old-year-old woman appearing stated age in no acute distress, alert and oriented to person, place and time. Mood and affect are normal  Head:  Normocephalic, without obvious abnormality, atraumatic.   Abdomen: Soft, non-tender, non-distended, no masses,   Extremities: no pedal edema, well perfused  Skin: Skin color, texture, turgor normal, no rashes or lesions.    Pelvic Exam:  External genitalia:  Midportion of the left labia majora has some erythema and appears irritated.  No rash or lesions seen.  Slight erythema on the midportion of the right labia majora.  Labia minora appears normal and is hypertrophied.  No ulcers or abnormalities identified.  Pelvic lymph nodes not enlarged   Urethral meatus: normal size, location, and without lesions or erythema  Urethra: no masses noted and non-tender  Bladder:  Minimal fullness, non-tender, no masses  Vagina:  A finger was inserted into the vagina and the patient was asymptomatic.  There was some tenderness palpating the urethra.  Patient declined a speculum exam  Anus and Perineum: external appearance normal  Rectovaginal: deferred    Assessment/PLAN:     1. Vulvodynia  Patient is a 63-year-old female with a long history of vulvodynia and vaginismus.  She had taper down on the nortriptyline thinking it was contributing to her dyspepsia however she realizes this is not the case and is going to start increasing her dose again.  She feels like her nerves have essentially been more hyperactive since going down on the nortriptyline and she appreciates more vulvar discomfort.  She continues to have other health concerns including palpitations and I recommend holding on female pelvic floor physical therapy until she is back up on  mg Oral Daily     methylphenidate  5 mg Oral BID     omeprazole  20 mg Oral QAM AC     oxyCODONE  15 mg Oral Q12H     polyethylene glycol  17 g Oral Daily     senna-docusate  1 tablet Oral BID    Or     senna-docusate  2 tablet Oral BID     sodium chloride (PF)  3 mL Intracatheter Q8H     vancomycin (VANCOCIN) IV  1,750 mg Intravenous Q8H      PRN   [START ON 2020] acetaminophen, lidocaine 4%, lidocaine (buffered or not buffered), magnesium sulfate, magnesium sulfate, metoclopramide **OR** metoclopramide, naloxone, naloxone, ondansetron **OR** ondansetron, oxyCODONE, potassium chloride, potassium chloride with lidocaine, potassium chloride, potassium chloride, potassium chloride, potassium phosphate (KPHOS) in D5W IV, potassium phosphate (KPHOS) in D5W IV, potassium phosphate (KPHOS) in D5W IV, potassium phosphate (KPHOS) in D5W IV, potassium phosphate (KPHOS) in D5W IV, prochlorperazine **OR** prochlorperazine **OR** prochlorperazine, sodium chloride (PF)   Allergies   Allergies   Allergen Reactions     Food Other (See Comments)     Pineapple and pineapple juice causes severe vomiting     Morphine      Sedated but the pain was not relieved       Penicillins Other (See Comments)     Childhood reaction, possible anaphylaxis     Pineapple Nausea and Vomiting        Physical Exam   Temp: 97.7  F (36.5  C) Temp  Min: 97.6  F (36.4  C)  Max: 98.8  F (37.1  C)  Resp: 16 Resp  Min: 12  Max: 25  SpO2: 97 % SpO2  Min: 94 %  Max: 100 %  Pulse: 97 Pulse  Min: 81  Max: 115    No data recorded  BP: 108/76 Systolic (24hrs), Av , Min:102 , Max:116   Diastolic (24hrs), Av, Min:76, Max:85     I/Os: I/O last 3 completed shifts:  In: 3865 [P.O.:240; I.V.:500]  Out: 4425 [Urine:4425]     Exam:   General: chronically ill appearing  Mental Status: AAOx4.    Significant Results and Procedures   Lactic Acid:   Recent Labs   Lab Test 20  19120  1440   LACT 3.1*  --    LACTS  --  3.3*     CBC:   Recent Labs   Lab  Test 09/25/20  0354 09/24/20  0655 09/23/20  0645   WBC 16.2* 14.7* 13.8*   HGB 11.0* 12.3* 13.3   HCT 32.5* 36.0* 38.9*    214 232        her usual dose of nortriptyline.  I also think continuing with the vaginal estrogen just right inside the introitus is a good idea.  Patient expresses understanding and will follow-up in three months.  She will be due for annual exam.    2. Postmenopausal status      3. Encounter for screening mammogram for malignant neoplasm of breast  Mammogram ordered  - MAMMO SCREENING W LANRE BILATERAL; Future         Return in about 3 months (around 4/27/2022) for annual well woman exam.    Instructions provided as documented in the AVS.

## 2022-01-30 ENCOUNTER — HEALTH MAINTENANCE LETTER (OUTPATIENT)
Age: 52
End: 2022-01-30

## 2022-09-24 ENCOUNTER — HEALTH MAINTENANCE LETTER (OUTPATIENT)
Age: 52
End: 2022-09-24

## 2023-05-08 ENCOUNTER — HEALTH MAINTENANCE LETTER (OUTPATIENT)
Age: 53
End: 2023-05-08

## 2025-04-29 NOTE — ED PROVIDER NOTES
[place] EMERGENCY DEPARTMENT ([facility])  October 7, 2020  History     Chief Complaint   Patient presents with     Post-op Problem     HPI  Gaetano Holley is a 50 year old male, PMH notable for right frontal glioblastoma (initially diagnosed in January 2020, presenting with seizure), for which he underwent right frontal craniotomy and radical subtotal resection with surgical pathology showing glioblastoma, as well as following TMZ and radiation treatments, maintained on TMZ and Optune.  Surveillance MRI in July, however was concerning for progression, and patient underwent scheduled right sided craniotomy and surgical resection of the likely recurrent GBM in September (9/23/2020).  Presenting again today at recommendation of Neurosurgery and concern for possible CSF leak.    Patient was recently discharged, having been admitted 9/23/2020-9/29/2020 to the Neurosurgery service under Dr. Haq, and underwent Stealth and 5-8 LA-assisted right craniotomy for tumor resection with intraoperative MRI on 9/23/2020.  Discharge to TCU thereafter where he has been doing physical and speech therapy.  He reports that physical therapy has been going fairly well, however he has had been having some difficulty with cognition to some degree.  Loved one who accompanies him today also reports that he has had some decreased level of alertness and difficulty staying awake.  Patient reports some frontal headache across the forehead to both sides as well as some eye pain including discomfort with eye movements.  Otherwise no headaches.  There is been no traumas or falls.  They did notice some leaking from the right sided craniotomy wound that was just mild yesterday and then worse today during PT.  The Neurosurgery team was contacted who recommend that he come into the ED for further evaluation and management.    Otherwise, the patient denies any fevers or chills.  No loss of vision or blurred vision.  No hearing changes.   No particular neck pain or stiffness.  No chest pain, cough, shortness of breath or other such symptoms.  No abdominal pain, nausea, vomiting.  No other rashes or skin changes.  Occasionally his incision has been itchy but otherwise no concerns.  No redness to the wound, no purulent drainage, no bleeding.  Has some chronic tingling/altered sensation to the upper extremities that have been present for multiple months, unchanged.  No particular new no new numbness, tingling or weakness.  No other new symptoms or complaints at this time.  Please see ROS for further details.      PAST MEDICAL HISTORY:   Past Medical History:   Diagnosis Date     Abdominal pain 2008    unspecified      Acute cholecystitis 07/01/2008     Depressive disorder 1998     Erectile dysfunction 2008     Hypertension      Impaired fasting glucose 12/12/2008     Iron deficiency anemia      Seizures (H)        PAST SURGICAL HISTORY:   Past Surgical History:   Procedure Laterality Date     CHOLECYSTECTOMY  07/01/2008     colonoscopy biopsy  12/01/2008     MRI CRANIOTOMY WITH OPTICAL TRACKING SYSTEM Right 9/23/2020    Procedure: INTRAOPERATIVE Magnetic Resonance Imaging CRANIOTOMY, USING OPTICAL TRACKING SYSTEM, STEALTH;  Surgeon: Raul Haq MD;  Location: [facility] OR     right frontal craniotomy  01/07/2020       Past medical history, past surgical history, medications, and allergies were reviewed with the patient.     FAMILY HISTORY: History reviewed. No pertinent family history.    SOCIAL HISTORY:   Social History     Tobacco Use     Smoking status: Never Smoker     Smokeless tobacco: Never Used   Substance Use Topics     Alcohol use: Yes     Comment: occasionally     Social history was reviewed with the patient.       Patient's Medications   New Prescriptions    No medications on file   Previous Medications    CICLOPIROX (LOPROX) 0.77 % CREAM    Apply topically as needed    DEXAMETHASONE (DECADRON) 1 MG TABLET    Take 1 tablet (1 mg) by  [FreeTextEntry1] : alert and non-verbal, follows some simple spoken requests\par  EOM full without sustained nystagmus, PERRL, face symmetrical\par  Motor - full strength in all extremities\par  Coord - no tremor, ataxia\par  Gait - stands without difficulty, normal gait.  mouth every 8 hours    DEXAMETHASONE (DECADRON) 1 MG TABLET    Take 3 tablets (3 mg) by mouth every 8 hours for 4 days    DEXAMETHASONE (DECADRON) 1.5 MG TABLET    Take 2 tablets (3 mg) by mouth every 8 hours    DEXAMETHASONE (DECADRON) 2 MG TABLET    Take 1 tablet (2 mg) by mouth every 8 hours    ESCITALOPRAM (LEXAPRO) 10 MG TABLET    Take 1 tablet (10 mg) by mouth daily    FLUTICASONE (FLONASE) 50 MCG/ACT NASAL SPRAY    Spray 50 sprays into both nostrils daily    HEPARIN SODIUM, PORCINE, (HEPARIN ANTICOAGULANT) 5000 UNIT/0.5ML INJECTION    Inject 0.5 mLs (5,000 Units) Subcutaneous every 8 hours    LEVETIRACETAM (KEPPRA) 750 MG TABLET    Take 1 tablet (750 mg) by mouth 2 times daily    LISINOPRIL (ZESTRIL) 10 MG TABLET    Take 1 tablet (10 mg) by mouth daily    METHYLPHENIDATE (RITALIN) 5 MG TABLET    Take 1 tablet (5 mg) by mouth 2 times daily    OMEPRAZOLE (PRILOSEC) 20 MG DR CAPSULE    TAKE 2 CAPSULES BY MOUTH ONCE DAILY BEFORE MEAL(S) -  DO  NOT  CRUSH    ONDANSETRON (ZOFRAN) 8 MG TABLET    Take 1 tablet (8 mg) by mouth daily    OXYCODONE (ROXICODONE) 5 MG TABLET    Take 1-2 tablets (5-10 mg) by mouth every 3 hours as needed for pain    OXYCODONE (XTAMPZA ER) 13.5 MG 12 HR TABLET    Take 1 tablet (13.5 mg) by mouth every 12 hours    POLYETHYLENE GLYCOL (MIRALAX) 17 G PACKET    Take 17 g by mouth daily    PROCHLORPERAZINE (COMPAZINE) 10 MG TABLET    Take 1 tablet (10 mg) by mouth as needed    SENNA-DOCUSATE (SENOKOT-S/PERICOLACE) 8.6-50 MG TABLET    Take 1 tablet by mouth 2 times daily    SILDENAFIL (REVATIO) 20 MG TABLET    Take 1 tablet (20 mg) by mouth as needed   Modified Medications    No medications on file   Discontinued Medications    No medications on file          Allergies   Allergen Reactions     Food Other (See Comments)     Pineapple and pineapple juice causes severe vomiting     Morphine      Sedated but the pain was not relieved       Penicillins Other (See Comments)     Childhood reaction,  possible anaphylaxis     Pineapple Nausea and Vomiting        Review of Systems   Constitutional: Negative for chills and fever.   HENT: Negative for hearing loss.    Eyes: Positive for pain. Negative for visual disturbance.   Respiratory: Negative for cough, chest tightness and shortness of breath.    Gastrointestinal: Negative for abdominal pain, nausea and vomiting.   Musculoskeletal: Negative for neck pain and neck stiffness.   Skin: Negative for color change and rash.   Neurological: Positive for headaches (frontal). Negative for syncope, weakness and numbness.   All other systems reviewed and are negative.        Physical Exam   BP: 101/69  Pulse: 80  Temp: 98.5  F (36.9  C)  Resp: 16  Height: 182.9 cm (6')  Weight: 108.9 kg (240 lb)  SpO2: 100 %      Physical Exam   CONSTITUTIONAL: Well-developed and well-nourished. Awake and alert. Non-toxic appearance. No acute distress.   HENT:   - Head: The patient has a craniotomy incision over the top of the frontal scalp and extending towards the right temple.  The incision appears to be generally intact.  There is no abnormal erythema, induration or bleeding or drainage currently.  He does have a dressing placed that looks like he may have had some fluid earlier.  There is a soft portion on the right frontal scalp above the forehead that is very soft and feels as though there is fluid under the skin.  Nontender.  Unable to appreciate focal neuro deficit/abnormality.  No obvious meningeal findings.    - Ears: Hearing and external ear grossly normal. No blood.   - Nose: Nose normal. No rhinorrhea. No epistaxis.   - Mouth/Throat: MMM  EYES: Conjunctivae and lids are normal. No scleral icterus.   NECK: Normal range of motion and phonation normal. Neck supple.  No tracheal deviation, no stridor. No edema or erythema noted. No stiffness/rigidity.  CARDIOVASCULAR: Normal rate, regular rhythm and no appreciable abnormal heart sounds.  PULMONARY/CHEST: Normal work of  breathing. No accessory muscle usage or stridor. No respiratory distress.  No appreciable abnormal breath sounds.  ABDOMEN: Soft, non-distended. No tenderness. No rigidity, rebound or guarding.   MUSCULOSKELETAL: Extremities warm and seemingly well perfused. No edema or calf tenderness.  NEUROLOGIC: Awake, alert. Not disoriented. He displays no atrophy and no tremor. Normal tone. No seizure activity. GCS 15. Speech delivery is appropriate in content, even advanced using some advanced , but slightly slowed in delivery.   SKIN: Skin is warm and dry. No rash noted. No diaphoresis. No pallor.   PSYCHIATRIC: Normal mood and affect. Speech and behavior normal. Thought processes linear. Cognition and memory are normal.       ED Course     ED Course as of Oct 07 2224   Wed Oct 07, 2020   2009 Pt being roomed. Neurosurgery already paged.       2126 Radiology called, concerned for CSF leak on head CT, which fits w/ clinical presentation. NS paged to update on imaging.       2156 NS updated on concern for CSF leak on head CT      2215 NS had us hold off on Abx until after they do their drain, they will order.             Assessments & Plan (with Medical Decision Making)   IMPRESSION: 50 year old male w/ PMH notable for right frontal glioblastoma (initially diagnosed in January 2020, presenting with seizure), for which he underwent right frontal craniotomy and radical subtotal resection with surgical pathology showing glioblastoma, as well as following TMZ and radiation treatments, maintained on TMZ and Optune.  Surveillance MRI in July, however was concerning for progression, and patient underwent scheduled right sided craniotomy and surgical resection of the likely recurrent GBM in September (9/23/2020).  Presenting again today at recommendation of Neurosurgery and concern for possible CSF leak.     Clinically, patient appears nontoxic, NAD.  He appears fairly alert currently, and loved one actually says this is the most  alert he has been in quite some time.  He appears to be answering questions appropriately though his speech delivery is slightly slowed.  There is no obvious focal neuro deficits at this time.  The incision itself does not look markedly abnormal and there are no obvious findings for infection though the dressing he is in place does look like some fluid may have drained out of that area previously. Otherwise on examination, no acute findings.    Ddx includes, but not limited to, CSF leak which I m most suspicious for based upon his presentation, less likely infection, with serous drainage, etc.  Cannot exclude infectious process though it otherwise does not sound as though he is having fever, etc.  No known COVID exposures, etc     PLAN: Laboratory studies, head imaging, Neurosurgery consult and admission, patient in agreement.     RESULTS:  - Labs: WBC 11.6, (up from previous, Hgb 13.2 (up from previous, no acute findings on CMP outside of slight elevation of ALT, CRP negative, ESR  --- Blood cultures pending  --- Asymptomatic COVID screening pending  - Imaging: Written preliminary reports reviewed:  --- CT Head: Postsurgical changes of right frontal craniotomy and underlying resection cavity in the right frontal lobe extending into the anterior horn of the right lateral ventricle. There is a 0.8 cm defect in the posterior aspect of the craniotomy with extension of CSF density fluid into the defect and into right frontal scalp, compatible with CSF Leak.  Results reviewed with Neurosurgery.  --- Results reviewed with Neurosurgery.  --- Results/reports reviewed w/ patient who expresses understanding of findings and F/U recommendations.    INTERVENTIONS:   - Neurosurgery consult    RE-EVALUATION:  - Pt otherwise continues to do well here in the ED, no acute issues or apparent concerning changes in vitals or clinical appearance.    DISCUSSIONS:  - w/ Neurosurgery: They have seen and evaluated the patient here in the ED.  Reviewed patient/presentation, current state of workup/any pending studies. They will admit for further evaluation/management, F/U pending studies as needed, coordinate w/ consulting services as needed. They will order the abx for after they place their drain. No additional requests/recommendations for workup/management for in the ED at this time.      DISPOSITION/PLANNING:  - IMPRESSION: CSF leak  - DISPOSITION: Admit to Neurosurgery for further evaluation management  - PENDING: asymptomatic COVID screen, blood cultures, ESR: Neurosurgery going to initiate Abx after they are able to place drain/get samples  - RECOMMENDATIONS: Initiate Abx as soon as cleared by Neurosurgery    ______________________________________________________________________    I, Robyn Wheat, am serving as a trained medical scribe to document services personally performed by Teresa Peña MD, based on the provider's statements to me.  I, Teresa Peña MD, was physically present and have reviewed and verified the accuracy of this note documented by Robyn Wheat.      10/7/2020   [facility] EMERGENCY DEPARTMENT     Teresa Peña MD  10/09/20 0151       Teresa Peña MD  10/09/20 0153

## (undated) DEVICE — DRAPE MAYO STAND 23X54 8337

## (undated) DEVICE — NDL BLUNT 17GA 1.5" 8881202330

## (undated) DEVICE — DRAPE SHEET HALF 40X60" 9358

## (undated) DEVICE — CAST PADDING 4" STERILE 9044S

## (undated) DEVICE — SPONGE COTTONOID 1/2X1 1/2" 20-06S

## (undated) DEVICE — PACK CRANIOTOMY

## (undated) DEVICE — PREP SKIN SCRUB TRAY 4461A

## (undated) DEVICE — TRACKER PATIENT NON-INVASIVE AXIEM 9734887XOM

## (undated) DEVICE — SYR 30ML LL W/O NDL 302832

## (undated) DEVICE — SPONGE SURGIFOAM 100 1974

## (undated) DEVICE — PAD CHUX UNDERPAD 23X24" 7136

## (undated) DEVICE — DRAPE POUCH INSTRUMENT 1018

## (undated) DEVICE — SU VICRYL 2-0 CT-2 CR 8X18" J726D

## (undated) DEVICE — ESU FCP CODMAN 9"X1.0MM VERSATRU 9009100SL

## (undated) DEVICE — BUR STRK 2.3MM TAPERED ROUTER - FA2 5407-FA2-023

## (undated) DEVICE — ADH SKIN CLOSURE PREMIERPRO EXOFIN 1.0ML 3470

## (undated) DEVICE — CATH TRAY FOLEY SURESTEP 16FR W/URNE MTR STLK LATEX A303316A

## (undated) DEVICE — BUR STRK 1.1MM STRAIGHT ROUTER 5820-070-011

## (undated) DEVICE — DRSG PRIMAPORE 03 1/8X6" 66000318

## (undated) DEVICE — PREP CHLORAPREP ORANGE 3ML  260415

## (undated) DEVICE — PREP POVIDONE IODINE SCRUB 7.5% 4OZ APL82212

## (undated) DEVICE — NDL INSUFFLATION 13GA 120MM C2201

## (undated) DEVICE — DECANTER BAG 2002S

## (undated) DEVICE — SOL NACL 0.9% IRRIG 1000ML BOTTLE 07138-09

## (undated) DEVICE — SOL RINGERS LACTATED 1000ML BAG 07953-09

## (undated) DEVICE — WIPES FOLEY CARE SURESTEP PROVON DFC100

## (undated) DEVICE — SPONGE COTTONOID 1/2X1/2" 20-04S

## (undated) DEVICE — LINEN TOWEL PACK X5 5464

## (undated) DEVICE — DRAPE POUCH IRR 1016

## (undated) DEVICE — SPONGE SURGIFOAM 12 1972

## (undated) DEVICE — PACK NEURO MINOR UMMC SNE32MNMU4

## (undated) DEVICE — ESU GROUND PAD ADULT W/CORD E7507

## (undated) DEVICE — DRAPE C-ARM W/STRAPS 42X72" 07-CA104

## (undated) DEVICE — TUBING INSUFFLATION W/FILTER CPC TO LUER 620-030-301

## (undated) DEVICE — PREP CHLORAPREP 26ML TINTED ORANGE  260815

## (undated) DEVICE — RX BACITRACIN OINTMENT 0.9G 1/32OZ CUR001109

## (undated) DEVICE — DRAPE SHEET REV FOLD 3/4 9349

## (undated) DEVICE — SU MONOCRYL 3-0 PS-1 27" Y936H

## (undated) DEVICE — LINEN TOWEL PACK X6 WHITE 5487

## (undated) DEVICE — PERFORATOR 14MM CODMAN

## (undated) DEVICE — MARKER SPHERES PASSIVE MEDT PACK 5 8801075

## (undated) DEVICE — GLOVE PROTEXIS BLUE W/NEU-THERA 7.5  2D73EB75

## (undated) DEVICE — CATH VA INTR 10FRX14CM KIT

## (undated) DEVICE — SU VICRYL 0 TIE 54" J608H

## (undated) DEVICE — DRSG GAUZE 4X4" TRAY 6939

## (undated) DEVICE — DRSG PRIMAPORE 02X3" 7133

## (undated) DEVICE — SU NUROLON 4-0 TF CR 8X18" C584D

## (undated) DEVICE — SURGICEL HEMOSTAT 4X8" 1952

## (undated) DEVICE — SOL WATER IRRIG 1000ML BOTTLE 2F7114

## (undated) DEVICE — DRAPE SPLIT SHEET 77X108 REINFORCED 29436

## (undated) DEVICE — DRSG KERLIX 4 1/2"X4YDS ROLL 6715

## (undated) DEVICE — GLOVE PROTEXIS MICRO 7.0  2D73PM70

## (undated) DEVICE — SPONGE COTTONOID 1/4X1/4" 20-01S

## (undated) DEVICE — STPL SKIN 35W ROTATING HEAD PRW35

## (undated) DEVICE — ENDO TROCAR SLEEVE KII Z-THREADED 05X100MM CTS02

## (undated) DEVICE — PREP POVIDONE IODINE SOLUTION 10% 4OZ

## (undated) DEVICE — DRAPE U SPLIT 74X120" 29440

## (undated) DEVICE — PREP CHLORAPREP CLEAR 3ML 260400

## (undated) DEVICE — ENDO TROCAR FIRST ENTRY KII FIOS Z-THRD 05X100MM CTF03

## (undated) DEVICE — DRAPE IOBAN INCISE 23X17" 6650EZ

## (undated) DEVICE — SOL NACL 0.9% 10ML VIAL 0409-4888-02

## (undated) DEVICE — SYR 03ML LL W/O NDL 309657

## (undated) DEVICE — DRAPE MICROSCOPE LEICA 54X150" AR8033650

## (undated) DEVICE — ESU CORD BIPOLAR GREEN 10-4000

## (undated) DEVICE — SPONGE COTTONOID 1X3" 20-10S

## (undated) DEVICE — SU MONOCRYL 4-0 PS-2 27" UND Y426H

## (undated) DEVICE — LABEL MEDICATION SYSTEM 3303-P

## (undated) DEVICE — STRAP UNIVERSAL POSITIONING 2-PIECE 4X47X76" 91-287

## (undated) DEVICE — BLADE KNIFE SURG 11 371111

## (undated) DEVICE — SUTURE BOOTS 051003PBX

## (undated) RX ORDER — SODIUM CHLORIDE, SODIUM LACTATE, POTASSIUM CHLORIDE, CALCIUM CHLORIDE 600; 310; 30; 20 MG/100ML; MG/100ML; MG/100ML; MG/100ML
INJECTION, SOLUTION INTRAVENOUS
Status: DISPENSED
Start: 2020-10-14

## (undated) RX ORDER — ACETAMINOPHEN 325 MG/1
TABLET ORAL
Status: DISPENSED
Start: 2020-09-23

## (undated) RX ORDER — PROPOFOL 10 MG/ML
INJECTION, EMULSION INTRAVENOUS
Status: DISPENSED
Start: 2020-10-14

## (undated) RX ORDER — FENTANYL CITRATE-0.9 % NACL/PF 10 MCG/ML
PLASTIC BAG, INJECTION (ML) INTRAVENOUS
Status: DISPENSED
Start: 2020-09-23

## (undated) RX ORDER — FENTANYL CITRATE 50 UG/ML
INJECTION, SOLUTION INTRAMUSCULAR; INTRAVENOUS
Status: DISPENSED
Start: 2020-10-14

## (undated) RX ORDER — PROPOFOL 10 MG/ML
INJECTION, EMULSION INTRAVENOUS
Status: DISPENSED
Start: 2020-09-23

## (undated) RX ORDER — LABETALOL HYDROCHLORIDE 5 MG/ML
INJECTION, SOLUTION INTRAVENOUS
Status: DISPENSED
Start: 2020-09-23

## (undated) RX ORDER — ONDANSETRON 2 MG/ML
INJECTION INTRAMUSCULAR; INTRAVENOUS
Status: DISPENSED
Start: 2020-09-23

## (undated) RX ORDER — ESMOLOL HYDROCHLORIDE 10 MG/ML
INJECTION INTRAVENOUS
Status: DISPENSED
Start: 2020-10-14

## (undated) RX ORDER — LIDOCAINE HYDROCHLORIDE 10 MG/ML
INJECTION, SOLUTION EPIDURAL; INFILTRATION; INTRACAUDAL; PERINEURAL
Status: DISPENSED
Start: 2020-09-24

## (undated) RX ORDER — CLINDAMYCIN PHOSPHATE 900 MG/50ML
INJECTION, SOLUTION INTRAVENOUS
Status: DISPENSED
Start: 2020-10-14

## (undated) RX ORDER — GENTAMICIN 40 MG/ML
INJECTION, SOLUTION INTRAMUSCULAR; INTRAVENOUS
Status: DISPENSED
Start: 2020-10-14

## (undated) RX ORDER — FENTANYL CITRATE 50 UG/ML
INJECTION, SOLUTION INTRAMUSCULAR; INTRAVENOUS
Status: DISPENSED
Start: 2020-09-23

## (undated) RX ORDER — ESMOLOL HYDROCHLORIDE 10 MG/ML
INJECTION INTRAVENOUS
Status: DISPENSED
Start: 2020-09-23

## (undated) RX ORDER — HYDROMORPHONE HYDROCHLORIDE 1 MG/ML
INJECTION, SOLUTION INTRAMUSCULAR; INTRAVENOUS; SUBCUTANEOUS
Status: DISPENSED
Start: 2020-10-15

## (undated) RX ORDER — BUPIVACAINE HYDROCHLORIDE AND EPINEPHRINE 2.5; 5 MG/ML; UG/ML
INJECTION, SOLUTION INFILTRATION; PERINEURAL
Status: DISPENSED
Start: 2020-09-23

## (undated) RX ORDER — FENTANYL CITRATE-0.9 % NACL/PF 10 MCG/ML
PLASTIC BAG, INJECTION (ML) INTRAVENOUS
Status: DISPENSED
Start: 2020-10-14

## (undated) RX ORDER — SODIUM CHLORIDE 9 MG/ML
INJECTION, SOLUTION INTRAVENOUS
Status: DISPENSED
Start: 2020-10-15

## (undated) RX ORDER — ONDANSETRON 2 MG/ML
INJECTION INTRAMUSCULAR; INTRAVENOUS
Status: DISPENSED
Start: 2020-10-14

## (undated) RX ORDER — LIDOCAINE HYDROCHLORIDE 20 MG/ML
INJECTION, SOLUTION EPIDURAL; INFILTRATION; INTRACAUDAL; PERINEURAL
Status: DISPENSED
Start: 2020-09-23

## (undated) RX ORDER — MANNITOL 20 G/100ML
INJECTION, SOLUTION INTRAVENOUS
Status: DISPENSED
Start: 2020-09-23

## (undated) RX ORDER — SODIUM CHLORIDE 9 MG/ML
INJECTION, SOLUTION INTRAVENOUS
Status: DISPENSED
Start: 2020-09-23

## (undated) RX ORDER — METOCLOPRAMIDE HYDROCHLORIDE 5 MG/ML
INJECTION INTRAMUSCULAR; INTRAVENOUS
Status: DISPENSED
Start: 2020-09-23

## (undated) RX ORDER — LIDOCAINE HYDROCHLORIDE 20 MG/ML
INJECTION, SOLUTION EPIDURAL; INFILTRATION; INTRACAUDAL; PERINEURAL
Status: DISPENSED
Start: 2020-10-14

## (undated) RX ORDER — DEXAMETHASONE SODIUM PHOSPHATE 4 MG/ML
INJECTION, SOLUTION INTRA-ARTICULAR; INTRALESIONAL; INTRAMUSCULAR; INTRAVENOUS; SOFT TISSUE
Status: DISPENSED
Start: 2020-10-14

## (undated) RX ORDER — CITRIC ACID/SODIUM CITRATE 334-500MG
SOLUTION, ORAL ORAL
Status: DISPENSED
Start: 2020-10-14